# Patient Record
Sex: MALE | Race: WHITE | NOT HISPANIC OR LATINO | Employment: OTHER | URBAN - METROPOLITAN AREA
[De-identification: names, ages, dates, MRNs, and addresses within clinical notes are randomized per-mention and may not be internally consistent; named-entity substitution may affect disease eponyms.]

---

## 2017-01-04 ENCOUNTER — PROCEDURE VISIT (OUTPATIENT)
Dept: PULMONOLOGY | Facility: CLINIC | Age: 82
End: 2017-01-04
Payer: MEDICARE

## 2017-01-04 ENCOUNTER — OFFICE VISIT (OUTPATIENT)
Dept: PULMONOLOGY | Facility: CLINIC | Age: 82
End: 2017-01-04
Payer: MEDICARE

## 2017-01-04 VITALS
DIASTOLIC BLOOD PRESSURE: 70 MMHG | WEIGHT: 178 LBS | RESPIRATION RATE: 18 BRPM | HEIGHT: 66 IN | BODY MASS INDEX: 28.61 KG/M2 | SYSTOLIC BLOOD PRESSURE: 130 MMHG | HEART RATE: 84 BPM | OXYGEN SATURATION: 97 %

## 2017-01-04 DIAGNOSIS — F41.9 ANXIETY: ICD-10-CM

## 2017-01-04 DIAGNOSIS — J44.9 COPD, SEVERE: Chronic | ICD-10-CM

## 2017-01-04 DIAGNOSIS — I27.20 PULMONARY HYPERTENSION: ICD-10-CM

## 2017-01-04 DIAGNOSIS — Z99.81 HYPOXEMIA REQUIRING SUPPLEMENTAL OXYGEN: ICD-10-CM

## 2017-01-04 DIAGNOSIS — R09.02 HYPOXEMIA REQUIRING SUPPLEMENTAL OXYGEN: ICD-10-CM

## 2017-01-04 DIAGNOSIS — J44.9 COPD, SEVERE: Primary | Chronic | ICD-10-CM

## 2017-01-04 LAB
POST FEF 25 75: 0.27 L/S (ref 0.46–1.89)
POST FET 100: 13.1 S
POST FEV1 FVC: 36 %
POST FEV1: 0.72 L (ref 1.66–2.36)
POST FVC: 1.99 L (ref 2.58–3.41)
POST PEF: 3.31 L/S (ref 4.12–6.19)
PRE FEF 25 75: 0.2 L/S (ref 0.46–1.89)
PRE FET 100: 13.66 S
PRE FEV1 FVC: 29 %
PRE FEV1: 0.65 L (ref 1.66–2.36)
PRE FVC: 2.24 L (ref 2.58–3.41)
PRE PEF: 3.64 L/S (ref 4.12–6.19)
PREDICTED FEV1 FVC: 69.68 % (ref 64.84–74.52)
PREDICTED FEV1: 2.01 L (ref 1.66–2.36)
PREDICTED FVC: 2.99 L (ref 2.58–3.41)
PROVOCATION PROTOCOL: ABNORMAL

## 2017-01-04 PROCEDURE — 99214 OFFICE O/P EST MOD 30 MIN: CPT | Mod: PBBFAC | Performed by: INTERNAL MEDICINE

## 2017-01-04 PROCEDURE — 99999 PR PBB SHADOW E&M-EST. PATIENT-LVL IV: CPT | Mod: PBBFAC,,, | Performed by: INTERNAL MEDICINE

## 2017-01-04 PROCEDURE — 94060 EVALUATION OF WHEEZING: CPT | Mod: 26,S$PBB,, | Performed by: INTERNAL MEDICINE

## 2017-01-04 PROCEDURE — 99214 OFFICE O/P EST MOD 30 MIN: CPT | Mod: 25,S$PBB,, | Performed by: INTERNAL MEDICINE

## 2017-01-04 RX ORDER — ROFLUMILAST 500 UG/1
500 TABLET ORAL DAILY
Qty: 90 TABLET | Refills: 3 | Status: SHIPPED | OUTPATIENT
Start: 2017-01-04

## 2017-01-04 RX ORDER — IPRATROPIUM BROMIDE AND ALBUTEROL SULFATE 2.5; .5 MG/3ML; MG/3ML
3 SOLUTION RESPIRATORY (INHALATION)
Qty: 90 VIAL | Refills: 11 | Status: SHIPPED | OUTPATIENT
Start: 2017-01-04 | End: 2017-01-17 | Stop reason: SDUPTHER

## 2017-01-04 RX ORDER — ALPRAZOLAM 0.25 MG/1
0.25 TABLET ORAL DAILY PRN
Qty: 30 TABLET | Refills: 0 | Status: SHIPPED | OUTPATIENT
Start: 2017-01-04 | End: 2019-10-12

## 2017-01-04 NOTE — PATIENT INSTRUCTIONS
Treating Anxiety Disorders with Medication  An anxiety disorder can make you feel nervous or apprehensive, even without a clear reason. Certain anxiety disorders can cause intense feelings of fear or panic. You may even have physical symptoms, such as a racing heartbeat or dizziness. If you have these feelings, you dont have to suffer anymore. Treatment to help you overcome your fears will likely include therapy (also called counseling). Medication may also be prescribed to help control your symptoms.    Medications  Certain medications may be prescribed to help control your symptoms. As a result, you may feel less anxious. You may also feel able to move forward with therapy. At first, medications and dosages may need to be adjusted to find what works best for you. Try to be patient. Tell your health care provider how a medication makes you feel. This way, you can work together to find the treatment thats best for you. Keep in mind that medications can have side effects. Talk to your provider about any side effects that are bothering you. Changing the dose or type of medication may help. Dont stop taking medication on your own because it can cause symptoms to come back.  · Anti-anxiety medication: This medication eases symptoms and helps you relax. Your health care provider will explain when and how to use it. It may be prescribed for use before situations that makes you anxious. Or, you may be told to take it on a regular schedule. Anti-anxiety medication may make you feel a little sleepy or out of it. Dont drive a car or operate machinery while on this medication, until you know how it affects you.  Caution  Never use alcohol or other drugs with anti-anxiety medications. This could result in loss of muscular control, sedation, coma or death. Also, use only the amount of medication prescribed for you. If you think you may have taken too much, get emergency care right away.   · Antidepressant  medication: This kind of medication is often used to treat anxiety, even if you arent depressed. An antidepressant helps balance out brain chemicals. This helps keep anxiety under control. This medication is taken on a schedule. It takes a few weeks to start working. If you dont notice a change at first, you may just need more time. But if you dont notice results after the first few weeks, tell your provider.  Keep taking medications as prescribed  Never change your dosage or stop taking your medications without talking to your health care provider first. Keep the following in mind:  · Some medications must be taken on a schedule. Make this part of your daily routine. For instance, always take your pill before brushing your teeth. A pillbox can help you remember if youve taken your medication each day.  · Medications are often taken for 6 to 12 months. Your health care provider will then evaluate whether you need to stay on them. Many people who have also had therapy may no longer need medication to manage anxiety.  · You may need to stop taking medication slowly to give your body time to adjust. When its time to stop, your health care provider will tell you more. Remember: Never stop taking your medication without talking to your provider first.  · If symptoms return, you may need to start taking medications again. This isnt your fault. Its just the nature of your anxiety disorder.  Special concerns  · Side effects: Medications may cause side effects. Ask your health care provider or pharmacist what you can expect. They may have ideas for avoiding some side effects.  · Sexual problems: Some antidepressants can affect your desire for sex or your ability to have an orgasm. A change in dosage or medication often solves the problem. If you have a sexual side effect that concerns you, tell your health care provider.  · Addiction: Antidepressants are not addictive. And if youve never had a problem with drugs or  alcohol, you likely wont have a problem with anti-anxiety medication. But if you have history of addiction, you may need to avoid this medication.   © 2411-5082 [employer]. [address] [place] [address], [place] [address]. All rights reserved. This information is not intended as a substitute for professional medical care. Always follow your healthcare professional's instructions.        Treating Anxiety Disorders with Therapy  If you have an anxiety disorder, you dont have to suffer anymore. Treatment is available. Therapy (also called counseling) is often a helpful treatment for anxiety disorders. With therapy, a specially trained professional (therapist) helps you face and learn to manage your anxiety. Therapy can be short-term or long-term depending on your needs. In some cases, medication may also be prescribed with therapy. It may take time before you notice how much therapy is helping, but stick with it. With therapy, you can feel better.    Cognitive behavioral therapy (CBT)  Cognitive behavioral therapy (CBT) teaches you to manage anxiety. It does this by helping you understand how you think and act when youre anxious. Research has shown CBT to be a very effective treatment for anxiety disorders. How CBT is run is almost like a class. It involves homework and activities to build skills that teach you to cope with anxiety step by step. It can be done in a group or one-on-one, and often takes place for a set number of sessions. CBT has two main parts:  · Cognitive therapy helps you identify the negative, irrational thoughts that occur with your anxiety. Youll learn to replace these with more positive, realistic thoughts.  · Behavioral therapy helps you change how you react to anxiety. Youll learn coping skills and methods for relaxing to help you better deal with anxiety.  Other forms of therapy  Other therapy methods may work better for you than CBT. Or, you may move from CBT to another form of  therapy as your treatment needs change. This may mean meeting with a therapist by yourself or in a group. Therapy can also help you work through problems in your life, such as drug or alcohol dependence, that may be making your anxiety worse.  Getting better takes time  Therapy will help you feel better and teach you skills to help manage anxiety long term. But change doesnt happen right away. It takes a commitment from you. And treatment only works if you learn to face the causes of your anxiety. So, you might feel worse before you feel better. This can sometimes make it hard to stick with it. But remember: Therapy is a very effective treatment. The results will be well worth it.  Helping yourself  If anxiety is wearing you down, here are some things you can do to cope:  · Dont fight your feelings. Anxiety feeds itself. The more you worry about it, the worse it gets. Instead, try to identify what might have triggered your anxiety. Then try to put this threat in perspective.  · Keep in mind that you cant control everything about a situation. Change what you can and let the rest take its course.  · Exercise -- its a great way to relieve tension and help your body feel relaxed.  · Examine your life for stress, and try to find ways to reduce it.  · Avoid caffeine and nicotine, which can make anxiety symptoms worse.  · Fight the temptation to turn to alcohol or unprescribed drugs for relief. They only make things worse in the long run.   © 8753-9666 [employer]. [address] [place] [address], [place] [address]. All rights reserved. This information is not intended as a substitute for professional medical care. Always follow your healthcare professional's instructions.        Anxiety Reaction  Anxiety is the feeling we all get when we think something bad might happen. It is a normal response to stress and usually causes only a mild reaction. When anxiety becomes more severe, it can interfere with daily life.  In some cases, you may not even be aware of what it is youre anxious about. There may also be a genetic link or it may be a learned behavior in the home.  Both psychological and physical triggers cause stress reaction. It's often a response to fear or emotional stress, real or imagined. This stress may come from home, family, work, or social relationships.  During an anxiety reaction, you may feel:  · Helpless  · Nervous  · Depressed  · Irritable  Your body may show signs of anxiety in many ways. You may experience:  · Dry mouth  · Shakiness  · Dizziness  · Weakness  · Trouble breathing  · Breathing fast (hyperventilating)  · Chest pressure  · Sweating  · Headache  · Nausea  · Diarrhea  · Tiredness  · Inability to sleep  · Sexual problems  Home care  · Try to locate the sources of stress in your life. They may not be obvious. These may include:  ¨ Daily hassles of life (traffic jams, missed appointments, car troubles, etc.)  ¨ Major life changes, both good (new baby, job promotion) and bad (loss of job, loss of loved one)  ¨ Overload: feeling that you have too many responsibilities and can't take care of all of them at once  ¨ Feeling helpless, feeling that your problems are beyond what youre able to solve  · Notice how your body reacts to stress. Learn to listen to your body signals. This will help you take action before the stress becomes severe.  · When you can, do something about the source of your stress. (Avoid hassles, limit the amount of change that happens in your life at one time and take a break when you feel overloaded).  · Unfortunately, many stressful situations can't be avoided. It is necessary to learn how to better manage stress. There are many proven methods that will reduce your anxiety. These include simple things like exercise, good nutrition and adequate rest. Also, there are certain techniques that are helpful:  ¨ Relaxation  ¨ Breathing  exercises  ¨ Visualization  ¨ Biofeedback  ¨ Meditation  For more information about this, consult your doctor or go to a local bookstore and review the many books and tapes available on this subject.  Follow-up care  If you feel that your anxiety is not responding to self-help measures, contact your doctor or make an appointment with a counselor. You may need short-term psychological counseling and temporary medicine to help you manage stress.  Call 911  Call your healthcare provider right away if any of these occur:  · Trouble breathing  · Confusion  · Drowsiness or trouble wakening  · Fainting or loss of consciousness  · Rapid heart rate  · Seizure  · New chest pain that becomes more severe, lasts longer, or spreads into your shoulder, arm, neck, jaw, or back  When to seek medical advice  Call your healthcare provider right away if any of these occur:  · Your symptoms get worse  · Severe headache not relieved by rest and mild pain reliever  © 0753-2463 [employer]. [address] [place] [address]. All rights reserved. This information is not intended as a substitute for professional medical care. Always follow your healthcare professional's instructions.        Your Bodys Response to Anxiety  Normal anxiety is part of the bodys natural defense system. It's an alert to a threat that is unknown, vague, or comes from your own internal fears. While youre in this state, your feelings can range from a vague sense of worry to physical sensations such as a pounding heartbeat. These feelings make you want to react to the threat. An anxiety response is normal in many situations. But when you have an anxiety disorder, the same response can occur at the wrong times.    Anxiety can be helpful  Normal anxiety is a signal from your brain that warns you of a threat and is a normal response to help you prevent something or decrease the bad effects of something you can't control. For example, anxiety is a  normal response to situations that might damage your body, separate you from a loved one, or lose your job. The symptoms of anxiety can be physical and mental.  How does it feel?  At certain times, people with anxiety may have:  · Dizziness  · Muscle tension or pain  · Restlessness  · Sleeplessness  · Difficulty concentrating  · Racing heartbeat  · Fast breathing  · Shaking or trembling  · Stomachache  · Diarrhea  · Loss of energy  · Sweating  · Cold, clammy hands  · Chest pain  · Dry mouth  Anxiety can also be a problem  Anxiety can become a problem when it is difficult to control, occurs for months, and interferes with important parts of your life. With an anxiety disorder, your body has the response described above, but in inappropriate ways. The response a person has depends on the anxiety disorder he or she has. With some disorders, the anxiety is way out of proportion to the threat that triggers it. With others, anxiety may occur even when there isnt a clear threat or trigger.  Who does it affect?  Some people are more prone to persistent anxiety than others. It tends to run in families, and it affects more younger people than older people. But no age, race, or gender is immune to anxiety problems.  Anxiety can be treated  The good news is that the anxiety thats disrupting your life can be treated. Working with your doctor or other healthcare provider, you can develop skills to help you cope with anxiety. You can also gain the perspective you need to overcome your fears. Note: Good sources of support or guidance can be found at your local hospital, mental health clinic, or an employee assistance program.     If anxiety is wearing you down, here are some things you can do to cope:  · Keep in mind that you cant control everything about a situation. Change what you can and let the rest take its course.  · Exercise--its a great way to relieve tension and help your body feel relaxed.  · Avoid caffeine and  nicotine, which can make anxiety symptoms worse.  · Fight the temptation to turn to alcohol or unprescribed drugs for relief. They only make things worse in the long run.   © 7601-4301 [employer]. [address] [place] [address], [place] [address]. All rights reserved. This information is not intended as a substitute for professional medical care. Always follow your healthcare professional's instructions.      Alprazolam Oral tablet  What is this medicine?  ALPRAZOLAM (al PRAY vasu tineo) is a benzodiazepine. It is used to treat anxiety and panic attacks.  This medicine may be used for other purposes; ask your health care provider or pharmacist if you have questions.  What should I tell my health care provider before I take this medicine?  They need to know if you have any of these conditions:  · an alcohol or drug abuse problem  · bipolar disorder, depression, psychosis or other mental health conditions  · glaucoma  · kidney or liver disease  · lung or breathing disease  · myasthenia gravis  · Parkinson's disease  · porphyria  · seizures or a history of seizures  · suicidal thoughts  · an unusual or allergic reaction to alprazolam, other benzodiazepines, foods, dyes, or preservatives  · pregnant or trying to get pregnant  · breast-feeding  How should I use this medicine?  Take this medicine by mouth with a glass of water. Follow the directions on the prescription label. Take your medicine at regular intervals. Do not take it more often than directed. If you have been taking this medicine regularly for some time, do not suddenly stop taking it. You must gradually reduce the dose or you may get severe side effects. Ask your doctor or health care professional for advice. Even after you stop taking this medicine it can still affect your body for several days.  Talk to your pediatrician regarding the use of this medicine in children. Special care may be needed.  Overdosage: If you think you have taken too much of  this medicine contact a poison control center or emergency room at once.  NOTE: This medicine is only for you. Do not share this medicine with others.  What if I miss a dose?  If you miss a dose, take it as soon as you can. If it is almost time for your next dose, take only that dose. Do not take double or extra doses.  What may interact with this medicine?  Do not take this medicine with any of the following medications:  · certain medicines for HIV infection or AIDS  · ketoconazole  · itraconazole  This medicine may also interact with the following medications:  · birth control pills  · certain macrolide antibiotics like clarithromycin, erythromycin, troleandomycin  · cimetidine  · cyclosporine  · ergotamine  · grapefruit juice  · herbal or dietary supplements like kava kava, melatonin, dehydroepiandrosterone, DHEA, Mere's Wort or valerian  · imatinib, STI-571  · isoniazid  · levodopa  · medicines for depression, anxiety, or psychotic disturbances  · prescription pain medicines  · rifampin, rifapentine, or rifabutin  · some medicines for blood pressure or heart problems  · some medicines for seizures like carbamazepine, oxcarbazepine, phenobarbital, phenytoin, primidone  This list may not describe all possible interactions. Give your health care provider a list of all the medicines, herbs, non-prescription drugs, or dietary supplements you use. Also tell them if you smoke, drink alcohol, or use illegal drugs. Some items may interact with your medicine.  What should I watch for while using this medicine?  Visit your doctor or health care professional for regular checks on your progress. Your body can become dependent on this medicine. Ask your doctor or health care professional if you still need to take it.  You may get drowsy or dizzy. Do not drive, use machinery, or do anything that needs mental alertness until you know how this medicine affects you. To reduce the risk of dizzy and fainting spells, do not  stand or sit up quickly, especially if you are an older patient. Alcohol may increase dizziness and drowsiness. Avoid alcoholic drinks.  Do not treat yourself for coughs, colds or allergies without asking your doctor or health care professional for advice. Some ingredients can increase possible side effects.  What side effects may I notice from receiving this medicine?  Side effects that you should report to your doctor or health care professional as soon as possible:  · allergic reactions like skin rash, itching or hives, swelling of the face, lips, or tongue  · confusion, forgetfulness  · depression  · difficulty sleeping  · difficulty speaking  · feeling faint or lightheaded, falls  · mood changes, excitability or aggressive behavior  · muscle cramps  · trouble passing urine or change in the amount of urine  · unusually weak or tired  Side effects that usually do not require medical attention (report to your doctor or health care professional if they continue or are bothersome):  · change in sex drive or performance  · changes in appetite  This list may not describe all possible side effects. Call your doctor for medical advice about side effects. You may report side effects to FDA at 8-811-FDA-4290.  Where should I keep my medicine?  Keep out of the reach of children. This medicine can be abused. Keep your medicine in a safe place to protect it from theft. Do not share this medicine with anyone. Selling or giving away this medicine is dangerous and against the law.  Store at room temperature between 20 and 25 degrees C (68 and 77 degrees F). This medicine may cause accidental overdose and death if taken by other adults, children, or pets. Mix any unused medicine with a substance like cat litter or coffee grounds. Then throw the medicine away in a sealed container like a sealed bag or a coffee can with a lid. Do not use the medicine after the expiration date.  NOTE: This sheet is a summary. It may not cover all  possible information. If you have questions about this medicine, talk to your doctor, pharmacist, or health care provider.  NOTE:This sheet is a summary. It may not cover all possible information. If you have questions about this medicine, talk to your doctor, pharmacist, or health care provider. Copyright© 2016 [employer]

## 2017-01-04 NOTE — PROGRESS NOTES
"Subjective:      Kelton Mcdaniels is a 89 y.o. male with known COPD    Mr. Lr has very severe COPD.  FEV1 is 0.65 (32% predicted)  MRC grade 2.  COPD test score is 22.  Please see me a couple of times due to exacerbations added Daliresp is feeling much better.    He is currently enrolled in the home exercise program and doing the hand ergometer routine.  Medications reviewed Daliresp, Advair, DuoNeb and Spiriva Respimat daily   confirmed from the pharmacy " spiriva respimat" ready for him  He is on oxygen supplement to the 2 L/m.  Patient feels subjectively much better since I last saw him.  He requests medications for anxiety related to COPD.  Added Xanax for him to use this very sparingly   He has completed pulmonary rehabilitation department.    Also get him enrolled in the pulmonary chronic disease management overall doing well back in about 3-6 months    The following portions of the patient's history were reviewed and updated as appropriate:   He  has a past medical history of Atrial fibrillation, chronic; CAD in native artery (12/18/2015); Cataract; Chronic airway obstruction, not elsewhere classified; Coronary atherosclerosis of unspecified type of vessel, native or graft; Esophageal reflux; Essential hypertension; Heart disease; History of bladder cancer; History of colon cancer; Hyperlipidemia LDL goal <70; Hypertrophy of prostate without urinary obstruction and other lower urinary tract symptoms (LUTS); Impaired fasting glucose; Macular degeneration; Pulmonary hypertension (12/18/2015); and TR (tricuspid regurgitation) (12/18/2015).  He  does not have any pertinent problems on file.  He  has a past surgical history that includes partial coloectomy for large polyp; Coronary artery bypass graft; Colon surgery; and Back surgery.  His family history includes Asthma in his paternal uncle; Cancer in his brother; Diabetes in his father; Heart failure in his mother.  He  reports that he has quit smoking. His " smoking use included Cigarettes. He has a 140.00 pack-year smoking history. He has never used smokeless tobacco. He reports that he does not drink alcohol or use illicit drugs.  He has a current medication list which includes the following prescription(s): albuterol, albuterol-ipratropium 2.5mg-0.5mg/3ml, carvedilol, doxycycline, fluticasone-salmeterol 250-50 mcg/dose, hydrochlorothiazide, mucus clearing device, omeprazole, prednisone, roflumilast, simvastatin, tiotropium bromide, alprazolam, butenafine, gabapentin, and nitroglycerin.  Current Outpatient Prescriptions on File Prior to Visit   Medication Sig Dispense Refill    albuterol (PROAIR HFA) 90 mcg/actuation inhaler Inhale 2 puffs into the lungs every 4 (four) hours as needed. 1 Inhaler 11    carvedilol (COREG) 25 MG tablet TAKE ONE TABLET BY MOUTH TWICE DAILY 60 tablet 11    doxycycline (MONODOX) 100 MG capsule Take 1 capsule (100 mg total) by mouth 2 (two) times daily. 20 capsule 0    fluticasone-salmeterol 250-50 mcg/dose (ADVAIR DISKUS) 250-50 mcg/dose diskus inhaler INHALE ONE DOSE BY MOUTH TWICE DAILY 60 each 11    hydrochlorothiazide (HYDRODIURIL) 25 MG tablet Take 1 tablet (25 mg total) by mouth once daily. 90 tablet 3    mucus clearing device (ACAPELLA) Sariah 1 Device by Misc.(Non-Drug; Combo Route) route every 4 to 6 hours as needed. 1 Device 0    omeprazole (PRILOSEC) 20 MG capsule Take 1 capsule (20 mg total) by mouth once daily. 90 capsule 3    predniSONE (DELTASONE) 20 MG tablet 3 tab for 3 days. 2 tab for 3 days. 1 tab for 3 days. 1/2 tab for 3 days 21 tablet 0    simvastatin (ZOCOR) 40 MG tablet Take 1 tablet (40 mg total) by mouth every evening. 90 tablet 3    tiotropium bromide 2.5 mcg/actuation Mist Inhale 2.5 mcg into the lungs once daily. 4 g 11    [DISCONTINUED] albuterol-ipratropium 2.5mg-0.5mg/3mL (DUO-NEB) 0.5 mg-3 mg(2.5 mg base)/3 mL nebulizer solution Take 3 mLs by nebulization every 6 (six) hours while awake. DX:  "J44.9  Please fax to [phone] [facility] 90 vial 11    [DISCONTINUED] roflumilast (DALIRESP) 500 mcg Tab Take 1 tablet (500 mcg total) by mouth once daily. 30 tablet 3    butenafine 1 % cream Apply topically 2 (two) times daily. 12 g 0    gabapentin (NEURONTIN) 100 MG capsule Take 1 capsule (100 mg total) by mouth every evening. 30 capsule 0    nitroGLYCERIN (NITROSTAT) 0.4 MG SL tablet Place 1 tablet (0.4 mg total) under the tongue every 5 (five) minutes as needed for Chest pain. 20 tablet 12     No current facility-administered medications on file prior to visit.      He is allergic to no known allergies..    Review of Systems  A comprehensive review of systems was negative except for: Constitutional: positive for fatigue  Respiratory: positive for dyspnea on exertion  Neurological: positive for anxiety attacks       Objective:        Visit Vitals    /70    Pulse 84    Resp 18    Ht 5' 6" (1.676 m)    Wt 80.7 kg (178 lb)    SpO2 97%  Comment: 2.0 LPM    BMI 28.73 kg/m2     FEV1: 0.65 L, 32 % of predicted  FEV1/FVC: 29 %  General appearance: alert, appears stated age, cooperative, no distress and using purse lip breathing skills  Head: Normocephalic, without obvious abnormality, atraumatic  Lungs: clear to auscultation bilaterally, normal percussion bilaterally and diminshed air flow bilaterally  Heart: regular rate and rhythm, S1, S2 normal, no murmur, click, rub or gallop  Extremities: extremities normal, atraumatic, no cyanosis or edema  Pulses: 2+ and symmetric  Skin: Skin color, texture, turgor normal. No rashes or lesions  Neurologic: Grossly normal         FEV1 was 0.65 (32% predicted) FVC was 2.24 (75% predicted) FEV1/FEC was 29    Assessment:      Problem List Items Addressed This Visit     COPD, severe - Primary (Chronic)    Relevant Medications    albuterol-ipratropium 2.5mg-0.5mg/3mL (DUO-NEB) 0.5 mg-3 mg(2.5 mg base)/3 mL nebulizer solution    alprazolam (XANAX) 0.25 MG tablet    " roflumilast (DALIRESP) 500 mcg Tab    Other Relevant Orders    X-Ray Chest PA And Lateral    Spirometry with/without bronchodilator    Ambulatory Referral to Pulmonary Disease Management    Pulmonary hypertension    Hypoxemia requiring supplemental oxygen    Anxiety    Relevant Medications    alprazolam (XANAX) 0.25 MG tablet         Plan:    Adherence with respiratory regime, oxygen  Xanax PRN for anxiety, use sparingly ( avoid other sedative meds)  Return in about 6 months (around 7/4/2017) for cxr, [facility], [facility].    This note was prepared using voice recognition system and is likely to have sound alike errors that may have been overlooked even after proof reading.  Please call me with any questions    Discussed diagnosis, its evaluation, treatment and usual course. All questions answered.    Thank you for the courtesy of participating in the care of this patient    Vinicius Cat MD    Patient prescribed a controlled substance. Printed and signed copy of prescription given to patient. Side effects reviewed in detail. Instructed not to combine with other controlled substances, alcohol or recreational drugs. Habit forming, addictive potential of drug discussed. Advised not to operate a vehicle while taking this medication. Policy of limited refills discussed

## 2017-01-04 NOTE — MR AVS SNAPSHOT
[facility]  [address] [place] [id number]  Phone: [phone]  Fax: [phone]                  Kelton Mcdaniels   2017 9:20 AM   Office Visit    Description:  Male : 1/3/1928   Provider:  Vinicius Cat MD   Department:  [facility]           Reason for Visit     COPD           Diagnoses this Visit        Comments    COPD, severe    -  Primary     Hypoxemia requiring supplemental oxygen         Pulmonary hypertension         Anxiety         COPD, severe                To Do List           Future Appointments        Provider Department Dept Phone    2017 9:40 AM [facility] [phone]    2017 8:40 AM Sunny Plummer MD [facility] [phone]      Goals (5 Years of Data)     None      Follow-Up and Disposition     Return in about 6 months (around 2017) for cxr, [facility], [facility].       These Medications        Disp Refills Start End    albuterol-ipratropium 2.5mg-0.5mg/3mL (DUO-NEB) 0.5 mg-3 mg(2.5 mg base)/3 mL nebulizer solution 90 vial 11 2017    Take 3 mLs by nebulization every 6 (six) hours while awake. DX: J44.9 - Nebulization    Pharmacy: [facility] [address] [place] [address] Ph #: [phone]       Notes to Pharmacy: Dx: COPD - J44.9    alprazolam (XANAX) 0.25 MG tablet 30 tablet 0 2017 2/3/2017    Take 1 tablet (0.25 mg total) by mouth daily as needed for Anxiety. For panic attacks - Oral    Pharmacy: [facility] [address] [place] [address] Ph #: [phone]       roflumilast (DALIRESP) 500 mcg Tab 90 tablet 3 2017     Take 1 tablet (500 mcg total) by mouth once daily. - Oral    Pharmacy: [facility] [address] [place] [address] Ph #: [phone]         [facility]     [facility] -  Assistance  Registered nurses in the [facility]  provide clinical advisement, health education, appointment booking, and other advisory services.  Call for this free service at [phone].             Medications           Message regarding Medications     Verify the changes and/or additions to your medication regime listed below are the same as discussed with your clinician today.  If any of these changes or additions are incorrect, please notify your healthcare provider.        START taking these NEW medications        Refills    alprazolam (XANAX) 0.25 MG tablet 0    Sig: Take 1 tablet (0.25 mg total) by mouth daily as needed for Anxiety. For panic attacks    Class: Normal    Route: Oral      CHANGE how you are taking these medications     Start Taking Instead of    albuterol-ipratropium 2.5mg-0.5mg/3mL (DUO-NEB) 0.5 mg-3 mg(2.5 mg base)/3 mL nebulizer solution albuterol-ipratropium 2.5mg-0.5mg/3mL (DUO-NEB) 0.5 mg-3 mg(2.5 mg base)/3 mL nebulizer solution    Dosage:  Take 3 mLs by nebulization every 6 (six) hours while awake. DX: J44.9 Dosage:  Take 3 mLs by nebulization every 6 (six) hours while awake. DX: J44.9  Please fax to [phone] [facility]    Reason for Change:  Reorder            Verify that the below list of medications is an accurate representation of the medications you are currently taking.  If none reported, the list may be blank. If incorrect, please contact your healthcare provider. Carry this list with you in case of emergency.           Current Medications     albuterol (PROAIR HFA) 90 mcg/actuation inhaler Inhale 2 puffs into the lungs every 4 (four) hours as needed.    albuterol-ipratropium 2.5mg-0.5mg/3mL (DUO-NEB) 0.5 mg-3 mg(2.5 mg base)/3 mL nebulizer solution Take 3 mLs by nebulization every 6 (six) hours while awake. DX: J44.9    alprazolam (XANAX) 0.25 MG tablet Take 1 tablet (0.25 mg total) by mouth daily as needed for Anxiety. For panic attacks    butenafine 1 % cream Apply topically 2 (two) times daily.    carvedilol (COREG)  "25 MG tablet TAKE ONE TABLET BY MOUTH TWICE DAILY    doxycycline (MONODOX) 100 MG capsule Take 1 capsule (100 mg total) by mouth 2 (two) times daily.    fluticasone-salmeterol 250-50 mcg/dose (ADVAIR DISKUS) 250-50 mcg/dose diskus inhaler INHALE ONE DOSE BY MOUTH TWICE DAILY    gabapentin (NEURONTIN) 100 MG capsule Take 1 capsule (100 mg total) by mouth every evening.    hydrochlorothiazide (HYDRODIURIL) 25 MG tablet Take 1 tablet (25 mg total) by mouth once daily.    mucus clearing device (ACAPELLA) Sariah 1 Device by Misc.(Non-Drug; Combo Route) route every 4 to 6 hours as needed.    nitroGLYCERIN (NITROSTAT) 0.4 MG SL tablet Place 1 tablet (0.4 mg total) under the tongue every 5 (five) minutes as needed for Chest pain.    omeprazole (PRILOSEC) 20 MG capsule Take 1 capsule (20 mg total) by mouth once daily.    predniSONE (DELTASONE) 20 MG tablet 3 tab for 3 days. 2 tab for 3 days. 1 tab for 3 days. 1/2 tab for 3 days    roflumilast (DALIRESP) 500 mcg Tab Take 1 tablet (500 mcg total) by mouth once daily.    simvastatin (ZOCOR) 40 MG tablet Take 1 tablet (40 mg total) by mouth every evening.    tiotropium bromide 2.5 mcg/actuation Mist Inhale 2.5 mcg into the lungs once daily.           Clinical Reference Information           Vital Signs - Last Recorded  Most recent update: 1/4/2017  9:56 AM by Vinicius Cat MD    BP Pulse Resp Ht Wt SpO2    130/70 84 18 5' 6" (1.676 m) 80.7 kg (178 lb) 97%    BMI                28.73 kg/m2          Blood Pressure          Most Recent Value    BP  130/70      Allergies as of 1/4/2017     No Known Allergies      Immunizations Administered on Date of Encounter - 1/4/2017     None      Orders Placed During Today's Visit      Normal Orders This Visit    Ambulatory Referral to Pulmonary Disease Management     Future Labs/Procedures Expected by Expires    X-Ray Chest PA And Lateral  1/4/2017 1/4/2018    Spirometry with/without bronchodilator  As directed 1/4/2018      [facility] " Sign-Up     Activating your [facility] account is as easy as 1-2-3!     1) Visit [email or web address], select Sign Up Now, enter this activation code and your date of birth, then select Next.  [email or web address]  Expires: 2/18/2017 10:18 AM      2) Create a username and password to use when you visit [facility] in the future and select a security question in case you lose your password and select Next.    3) Enter your e-mail address and click Sign Up!    Additional Information  If you have questions, please e-mail [email or web address] or call [phone] to talk to our [facility] staff. Remember, [facility] is NOT to be used for urgent needs. For medical emergencies, dial 911.         Instructions      Treating Anxiety Disorders with Medication  An anxiety disorder can make you feel nervous or apprehensive, even without a clear reason. Certain anxiety disorders can cause intense feelings of fear or panic. You may even have physical symptoms, such as a racing heartbeat or dizziness. If you have these feelings, you dont have to suffer anymore. Treatment to help you overcome your fears will likely include therapy (also called counseling). Medication may also be prescribed to help control your symptoms.    Medications  Certain medications may be prescribed to help control your symptoms. As a result, you may feel less anxious. You may also feel able to move forward with therapy. At first, medications and dosages may need to be adjusted to find what works best for you. Try to be patient. Tell your health care provider how a medication makes you feel. This way, you can work together to find the treatment thats best for you. Keep in mind that medications can have side effects. Talk to your provider about any side effects that are bothering you. Changing the dose or type of medication may help. Dont stop taking medication on your own because it can cause symptoms to come back.  · Anti-anxiety medication: This medication eases  symptoms and helps you relax. Your health care provider will explain when and how to use it. It may be prescribed for use before situations that makes you anxious. Or, you may be told to take it on a regular schedule. Anti-anxiety medication may make you feel a little sleepy or out of it. Dont drive a car or operate machinery while on this medication, until you know how it affects you.  Caution  Never use alcohol or other drugs with anti-anxiety medications. This could result in loss of muscular control, sedation, coma or death. Also, use only the amount of medication prescribed for you. If you think you may have taken too much, get emergency care right away.   · Antidepressant medication: This kind of medication is often used to treat anxiety, even if you arent depressed. An antidepressant helps balance out brain chemicals. This helps keep anxiety under control. This medication is taken on a schedule. It takes a few weeks to start working. If you dont notice a change at first, you may just need more time. But if you dont notice results after the first few weeks, tell your provider.  Keep taking medications as prescribed  Never change your dosage or stop taking your medications without talking to your health care provider first. Keep the following in mind:  · Some medications must be taken on a schedule. Make this part of your daily routine. For instance, always take your pill before brushing your teeth. A pillbox can help you remember if youve taken your medication each day.  · Medications are often taken for 6 to 12 months. Your health care provider will then evaluate whether you need to stay on them. Many people who have also had therapy may no longer need medication to manage anxiety.  · You may need to stop taking medication slowly to give your body time to adjust. When its time to stop, your health care provider will tell you more. Remember: Never stop taking your medication without talking to your  provider first.  · If symptoms return, you may need to start taking medications again. This isnt your fault. Its just the nature of your anxiety disorder.  Special concerns  · Side effects: Medications may cause side effects. Ask your health care provider or pharmacist what you can expect. They may have ideas for avoiding some side effects.  · Sexual problems: Some antidepressants can affect your desire for sex or your ability to have an orgasm. A change in dosage or medication often solves the problem. If you have a sexual side effect that concerns you, tell your health care provider.  · Addiction: Antidepressants are not addictive. And if youve never had a problem with drugs or alcohol, you likely wont have a problem with anti-anxiety medication. But if you have history of addiction, you may need to avoid this medication.   © 8010-8459 [employer]. [address] [place] [address], [place] [address]. All rights reserved. This information is not intended as a substitute for professional medical care. Always follow your healthcare professional's instructions.        Treating Anxiety Disorders with Therapy  If you have an anxiety disorder, you dont have to suffer anymore. Treatment is available. Therapy (also called counseling) is often a helpful treatment for anxiety disorders. With therapy, a specially trained professional (therapist) helps you face and learn to manage your anxiety. Therapy can be short-term or long-term depending on your needs. In some cases, medication may also be prescribed with therapy. It may take time before you notice how much therapy is helping, but stick with it. With therapy, you can feel better.    Cognitive behavioral therapy (CBT)  Cognitive behavioral therapy (CBT) teaches you to manage anxiety. It does this by helping you understand how you think and act when youre anxious. Research has shown CBT to be a very effective treatment for anxiety disorders. How CBT is run is  almost like a class. It involves homework and activities to build skills that teach you to cope with anxiety step by step. It can be done in a group or one-on-one, and often takes place for a set number of sessions. CBT has two main parts:  · Cognitive therapy helps you identify the negative, irrational thoughts that occur with your anxiety. Youll learn to replace these with more positive, realistic thoughts.  · Behavioral therapy helps you change how you react to anxiety. Youll learn coping skills and methods for relaxing to help you better deal with anxiety.  Other forms of therapy  Other therapy methods may work better for you than CBT. Or, you may move from CBT to another form of therapy as your treatment needs change. This may mean meeting with a therapist by yourself or in a group. Therapy can also help you work through problems in your life, such as drug or alcohol dependence, that may be making your anxiety worse.  Getting better takes time  Therapy will help you feel better and teach you skills to help manage anxiety long term. But change doesnt happen right away. It takes a commitment from you. And treatment only works if you learn to face the causes of your anxiety. So, you might feel worse before you feel better. This can sometimes make it hard to stick with it. But remember: Therapy is a very effective treatment. The results will be well worth it.  Helping yourself  If anxiety is wearing you down, here are some things you can do to cope:  · Dont fight your feelings. Anxiety feeds itself. The more you worry about it, the worse it gets. Instead, try to identify what might have triggered your anxiety. Then try to put this threat in perspective.  · Keep in mind that you cant control everything about a situation. Change what you can and let the rest take its course.  · Exercise -- its a great way to relieve tension and help your body feel relaxed.  · Examine your life for stress, and try to find ways  to reduce it.  · Avoid caffeine and nicotine, which can make anxiety symptoms worse.  · Fight the temptation to turn to alcohol or unprescribed drugs for relief. They only make things worse in the long run.   © 3077-8732 [employer]. [address] [place] [address], [place] [address]. All rights reserved. This information is not intended as a substitute for professional medical care. Always follow your healthcare professional's instructions.        Anxiety Reaction  Anxiety is the feeling we all get when we think something bad might happen. It is a normal response to stress and usually causes only a mild reaction. When anxiety becomes more severe, it can interfere with daily life. In some cases, you may not even be aware of what it is youre anxious about. There may also be a genetic link or it may be a learned behavior in the home.  Both psychological and physical triggers cause stress reaction. It's often a response to fear or emotional stress, real or imagined. This stress may come from home, family, work, or social relationships.  During an anxiety reaction, you may feel:  · Helpless  · Nervous  · Depressed  · Irritable  Your body may show signs of anxiety in many ways. You may experience:  · Dry mouth  · Shakiness  · Dizziness  · Weakness  · Trouble breathing  · Breathing fast (hyperventilating)  · Chest pressure  · Sweating  · Headache  · Nausea  · Diarrhea  · Tiredness  · Inability to sleep  · Sexual problems  Home care  · Try to locate the sources of stress in your life. They may not be obvious. These may include:  ¨ Daily hassles of life (traffic jams, missed appointments, car troubles, etc.)  ¨ Major life changes, both good (new baby, job promotion) and bad (loss of job, loss of loved one)  ¨ Overload: feeling that you have too many responsibilities and can't take care of all of them at once  ¨ Feeling helpless, feeling that your problems are beyond what youre able to solve  · Notice how your body  reacts to stress. Learn to listen to your body signals. This will help you take action before the stress becomes severe.  · When you can, do something about the source of your stress. (Avoid hassles, limit the amount of change that happens in your life at one time and take a break when you feel overloaded).  · Unfortunately, many stressful situations can't be avoided. It is necessary to learn how to better manage stress. There are many proven methods that will reduce your anxiety. These include simple things like exercise, good nutrition and adequate rest. Also, there are certain techniques that are helpful:  ¨ Relaxation  ¨ Breathing exercises  ¨ Visualization  ¨ Biofeedback  ¨ Meditation  For more information about this, consult your doctor or go to a local bookstore and review the many books and tapes available on this subject.  Follow-up care  If you feel that your anxiety is not responding to self-help measures, contact your doctor or make an appointment with a counselor. You may need short-term psychological counseling and temporary medicine to help you manage stress.  Call 911  Call your healthcare provider right away if any of these occur:  · Trouble breathing  · Confusion  · Drowsiness or trouble wakening  · Fainting or loss of consciousness  · Rapid heart rate  · Seizure  · New chest pain that becomes more severe, lasts longer, or spreads into your shoulder, arm, neck, jaw, or back  When to seek medical advice  Call your healthcare provider right away if any of these occur:  · Your symptoms get worse  · Severe headache not relieved by rest and mild pain reliever  © 6577-5389 [employer]. [address] [place] [address], [place] [address]. All rights reserved. This information is not intended as a substitute for professional medical care. Always follow your healthcare professional's instructions.        Your Bodys Response to Anxiety  Normal anxiety is part of the bodys natural defense system. It's  an alert to a threat that is unknown, vague, or comes from your own internal fears. While youre in this state, your feelings can range from a vague sense of worry to physical sensations such as a pounding heartbeat. These feelings make you want to react to the threat. An anxiety response is normal in many situations. But when you have an anxiety disorder, the same response can occur at the wrong times.    Anxiety can be helpful  Normal anxiety is a signal from your brain that warns you of a threat and is a normal response to help you prevent something or decrease the bad effects of something you can't control. For example, anxiety is a normal response to situations that might damage your body, separate you from a loved one, or lose your job. The symptoms of anxiety can be physical and mental.  How does it feel?  At certain times, people with anxiety may have:  · Dizziness  · Muscle tension or pain  · Restlessness  · Sleeplessness  · Difficulty concentrating  · Racing heartbeat  · Fast breathing  · Shaking or trembling  · Stomachache  · Diarrhea  · Loss of energy  · Sweating  · Cold, clammy hands  · Chest pain  · Dry mouth  Anxiety can also be a problem  Anxiety can become a problem when it is difficult to control, occurs for months, and interferes with important parts of your life. With an anxiety disorder, your body has the response described above, but in inappropriate ways. The response a person has depends on the anxiety disorder he or she has. With some disorders, the anxiety is way out of proportion to the threat that triggers it. With others, anxiety may occur even when there isnt a clear threat or trigger.  Who does it affect?  Some people are more prone to persistent anxiety than others. It tends to run in families, and it affects more younger people than older people. But no age, race, or gender is immune to anxiety problems.  Anxiety can be treated  The good news is that the anxiety thats disrupting  your life can be treated. Working with your doctor or other healthcare provider, you can develop skills to help you cope with anxiety. You can also gain the perspective you need to overcome your fears. Note: Good sources of support or guidance can be found at your local hospital, mental health clinic, or an employee assistance program.     If anxiety is wearing you down, here are some things you can do to cope:  · Keep in mind that you cant control everything about a situation. Change what you can and let the rest take its course.  · Exercise--its a great way to relieve tension and help your body feel relaxed.  · Avoid caffeine and nicotine, which can make anxiety symptoms worse.  · Fight the temptation to turn to alcohol or unprescribed drugs for relief. They only make things worse in the long run.   © 2803-6945 [employer]. [address] [place] [address], [place] [address]. All rights reserved. This information is not intended as a substitute for professional medical care. Always follow your healthcare professional's instructions.      Alprazolam Oral tablet  What is this medicine?  ALPRAZOLAM (al PRAY vasu tineo) is a benzodiazepine. It is used to treat anxiety and panic attacks.  This medicine may be used for other purposes; ask your health care provider or pharmacist if you have questions.  What should I tell my health care provider before I take this medicine?  They need to know if you have any of these conditions:  · an alcohol or drug abuse problem  · bipolar disorder, depression, psychosis or other mental health conditions  · glaucoma  · kidney or liver disease  · lung or breathing disease  · myasthenia gravis  · Parkinson's disease  · porphyria  · seizures or a history of seizures  · suicidal thoughts  · an unusual or allergic reaction to alprazolam, other benzodiazepines, foods, dyes, or preservatives  · pregnant or trying to get pregnant  · breast-feeding  How should I use this medicine?  Take this  medicine by mouth with a glass of water. Follow the directions on the prescription label. Take your medicine at regular intervals. Do not take it more often than directed. If you have been taking this medicine regularly for some time, do not suddenly stop taking it. You must gradually reduce the dose or you may get severe side effects. Ask your doctor or health care professional for advice. Even after you stop taking this medicine it can still affect your body for several days.  Talk to your pediatrician regarding the use of this medicine in children. Special care may be needed.  Overdosage: If you think you have taken too much of this medicine contact a poison control center or emergency room at once.  NOTE: This medicine is only for you. Do not share this medicine with others.  What if I miss a dose?  If you miss a dose, take it as soon as you can. If it is almost time for your next dose, take only that dose. Do not take double or extra doses.  What may interact with this medicine?  Do not take this medicine with any of the following medications:  · certain medicines for HIV infection or AIDS  · ketoconazole  · itraconazole  This medicine may also interact with the following medications:  · birth control pills  · certain macrolide antibiotics like clarithromycin, erythromycin, troleandomycin  · cimetidine  · cyclosporine  · ergotamine  · grapefruit juice  · herbal or dietary supplements like kava kava, melatonin, dehydroepiandrosterone, DHEA, [place]'s Wort or valerian  · imatinib, STI-571  · isoniazid  · levodopa  · medicines for depression, anxiety, or psychotic disturbances  · prescription pain medicines  · rifampin, rifapentine, or rifabutin  · some medicines for blood pressure or heart problems  · some medicines for seizures like carbamazepine, oxcarbazepine, phenobarbital, phenytoin, primidone  This list may not describe all possible interactions. Give your health care provider a list of all the medicines,  herbs, non-prescription drugs, or dietary supplements you use. Also tell them if you smoke, drink alcohol, or use illegal drugs. Some items may interact with your medicine.  What should I watch for while using this medicine?  Visit your doctor or health care professional for regular checks on your progress. Your body can become dependent on this medicine. Ask your doctor or health care professional if you still need to take it.  You may get drowsy or dizzy. Do not drive, use machinery, or do anything that needs mental alertness until you know how this medicine affects you. To reduce the risk of dizzy and fainting spells, do not stand or sit up quickly, especially if you are an older patient. Alcohol may increase dizziness and drowsiness. Avoid alcoholic drinks.  Do not treat yourself for coughs, colds or allergies without asking your doctor or health care professional for advice. Some ingredients can increase possible side effects.  What side effects may I notice from receiving this medicine?  Side effects that you should report to your doctor or health care professional as soon as possible:  · allergic reactions like skin rash, itching or hives, swelling of the face, lips, or tongue  · confusion, forgetfulness  · depression  · difficulty sleeping  · difficulty speaking  · feeling faint or lightheaded, falls  · mood changes, excitability or aggressive behavior  · muscle cramps  · trouble passing urine or change in the amount of urine  · unusually weak or tired  Side effects that usually do not require medical attention (report to your doctor or health care professional if they continue or are bothersome):  · change in sex drive or performance  · changes in appetite  This list may not describe all possible side effects. Call your doctor for medical advice about side effects. You may report side effects to FDA at 9-079-FDA-1272.  Where should I keep my medicine?  Keep out of the reach of children. This medicine can be  abused. Keep your medicine in a safe place to protect it from theft. Do not share this medicine with anyone. Selling or giving away this medicine is dangerous and against the law.  Store at room temperature between 20 and 25 degrees C (68 and 77 degrees F). This medicine may cause accidental overdose and death if taken by other adults, children, or pets. Mix any unused medicine with a substance like cat litter or coffee grounds. Then throw the medicine away in a sealed container like a sealed bag or a coffee can with a lid. Do not use the medicine after the expiration date.  NOTE: This sheet is a summary. It may not cover all possible information. If you have questions about this medicine, talk to your doctor, pharmacist, or health care provider.  NOTE:This sheet is a summary. It may not cover all possible information. If you have questions about this medicine, talk to your doctor, pharmacist, or health care provider. Copyright© 2016 Gold Standard

## 2017-01-17 DIAGNOSIS — J44.9 COPD, SEVERE: Chronic | ICD-10-CM

## 2017-01-17 RX ORDER — IPRATROPIUM BROMIDE AND ALBUTEROL SULFATE 2.5; .5 MG/3ML; MG/3ML
3 SOLUTION RESPIRATORY (INHALATION)
Qty: 270 ML | Refills: 11 | Status: SHIPPED | OUTPATIENT
Start: 2017-01-17 | End: 2017-01-18 | Stop reason: SDUPTHER

## 2017-01-18 RX ORDER — IPRATROPIUM BROMIDE AND ALBUTEROL SULFATE 2.5; .5 MG/3ML; MG/3ML
3 SOLUTION RESPIRATORY (INHALATION)
Qty: 270 ML | Refills: 11 | Status: SHIPPED | OUTPATIENT
Start: 2017-01-18 | End: 2018-02-06 | Stop reason: SDUPTHER

## 2017-01-20 ENCOUNTER — PROCEDURE VISIT (OUTPATIENT)
Dept: OPHTHALMOLOGY | Facility: CLINIC | Age: 82
End: 2017-01-20
Payer: MEDICARE

## 2017-01-20 DIAGNOSIS — H35.3210 AGE-RELATED MACULAR DEGENERATION, WET, RIGHT EYE: Primary | ICD-10-CM

## 2017-01-20 PROCEDURE — 92134 CPTRZ OPH DX IMG PST SGM RTA: CPT | Mod: PBBFAC,PO | Performed by: OPHTHALMOLOGY

## 2017-01-20 PROCEDURE — 67028 INJECTION EYE DRUG: CPT | Mod: PBBFAC,PO,RT | Performed by: OPHTHALMOLOGY

## 2017-01-20 PROCEDURE — 67028 INJECTION EYE DRUG: CPT | Mod: S$PBB,RT,, | Performed by: OPHTHALMOLOGY

## 2017-01-20 PROCEDURE — 99499 UNLISTED E&M SERVICE: CPT | Mod: S$PBB,,, | Performed by: OPHTHALMOLOGY

## 2017-01-20 RX ADMIN — AFLIBERCEPT 2 MG: 40 INJECTION, SOLUTION INTRAVITREAL at 01:01

## 2017-01-20 NOTE — PROGRESS NOTES
===============================  Kelton Mcdaniels,   89 y.o. male   Last visit [facility]: :12/15/2016   [facility] [place]  [facility]:  Corrected distance visual acuity was 20/400 -1 in the right eye and 20/40 -2 in the left eye.  Tonometry     Tonometry (Applanation, 1:19 PM)      Right Left   Pressure 16                   Not recorded         Not recorded        Chief Complaint   Patient presents with    Macular Degeneration     Eylea OD # 2 since worse        HPI     Macular Degeneration    Additional comments: Eylea OD # 2 since worse           Comments   --Smd/srn od dx. 10/5/15   --2+ vac cat ou      EYLEA OD 12/15/16  H/o Avastin          Last edited by CRESCENCIO Phillips MD on 1/20/2017  1:10 PM. (History)      Read Studies: y  Vitalsy    ________________  1/20/2017  1. Age-related macular degeneration, wet, right eye      Oct better, no better visually  1/20/2017  Diagnosis :  od srn #2  Today:   Eylea (afibercept) 2 mg/0.05 ml Intravitreal Injection , OD   Follow up: rtc 1  Mo fro eyel #3         Call 24/7 for any worsening of vision. Check  OU QD. Gave my home phone number.      Procedure  Note:   OD}  Eylea (afibercept) 2 mg/0.05 ml Intravitreal Injection    I have explained the Risks, Benefits and Alternatives of the procedure in detail.  The patient voices understanding and all questions have been answered.  The patient agrees to proceed as discussed.  LIDOCAINE 2%  subconj bleb  was used for anesthesia.  Topical betadine was used for antisepsis.  0.05 cc was  injected 3.7 mm from corneal limbus in the inferotemporal quadrant.  Following injection the IOP was less than thirty (<30) by tonopen.  The eye was then thoroughly irrigated with BSS.  Patient tolerated procedure well.  No complications were observed.  The Patient was educated that mild irritation tonight was normal secondary to topical antispsis use.  Pt was advised to call at any time day or night for pain, redness, or any decline in vision.  I gave the patient my home number as well as the clinic on call number. Daily visual checks and Amsler grid testing were reviewed.  ciloxan Antibiotic Drops to be used 4 times daily for 4 days  CRESCENCIO Phillips MD  Procedure ordered: y  Consent: y  Pre auth: y  MAR:y  Opnote: y  Charge capture:y  Sided procedure note: y  .       ===========================

## 2017-01-20 NOTE — MR AVS SNAPSHOT
[facility]  [address] [facility] [place] [address]  Phone: [phone]  Fax: [phone]                  Kelton Mcdaniels   2017 1:30 PM   Procedure visit    Description:  Male : 1/3/1928   Provider:  CRESCENCIO Phillips MD   Department:  [facility]           Reason for Visit     Macular Degeneration           Diagnoses this Visit        Comments    Age-related macular degeneration, wet, right eye    -  Primary            To Do List           Future Appointments        Provider Department Dept Phone    2017 1:30 PM CRESCENCIO Phillips MD [facility] - Ophthalmology [phone]    2017 9:00 AM PULMONARY DISEASE MANAGEMENT ON [facility] - Pulm Function [facility] [phone]    2017 9:40 AM LABORATORY, [facility] - Laboratory [phone]    2017 8:40 AM Sunny Plummer MD [facility] Internal Medicine [phone]    2017 9:00 AM ONL XR1- [facility] [phone]      Goals (5 Years of Data)     None      Follow-Up and Disposition     Return in about 1 month (around 2017).      [facility]     [facility] Care [facility] -  Assistance  Registered nurses in the [facility] provide clinical advisement, health education, appointment booking, and other advisory services.  Call for this free service at [phone].             Medications           Message regarding Medications     Verify the changes and/or additions to your medication regime listed below are the same as discussed with your clinician today.  If any of these changes or additions are incorrect, please notify your healthcare provider.        These medications were administered today        Dose Freq    aflibercept Soln 2 mg 2 mg Clinic/HOD 1 time    Si.05 mLs (2 mg total) by Intravitreal route one time.    Class: Normal    Route: Intravitreal           Verify that the below list of medications is an accurate representation of the medications you are  currently taking.  If none reported, the list may be blank. If incorrect, please contact your healthcare provider. Carry this list with you in case of emergency.           Current Medications     albuterol (PROAIR HFA) 90 mcg/actuation inhaler Inhale 2 puffs into the lungs every 4 (four) hours as needed.    albuterol-ipratropium 2.5mg-0.5mg/3mL (DUO-NEB) 0.5 mg-3 mg(2.5 mg base)/3 mL nebulizer solution Take 3 mLs by nebulization every 6 (six) hours while awake. DX: J44.9    alprazolam (XANAX) 0.25 MG tablet Take 1 tablet (0.25 mg total) by mouth daily as needed for Anxiety. For panic attacks    carvedilol (COREG) 25 MG tablet TAKE ONE TABLET BY MOUTH TWICE DAILY    doxycycline (MONODOX) 100 MG capsule Take 1 capsule (100 mg total) by mouth 2 (two) times daily.    fluticasone-salmeterol 250-50 mcg/dose (ADVAIR DISKUS) 250-50 mcg/dose diskus inhaler INHALE ONE DOSE BY MOUTH TWICE DAILY    hydrochlorothiazide (HYDRODIURIL) 25 MG tablet Take 1 tablet (25 mg total) by mouth once daily.    mucus clearing device (ACAPELLA) Sariah 1 Device by Misc.(Non-Drug; Combo Route) route every 4 to 6 hours as needed.    omeprazole (PRILOSEC) 20 MG capsule Take 1 capsule (20 mg total) by mouth once daily.    predniSONE (DELTASONE) 20 MG tablet 3 tab for 3 days. 2 tab for 3 days. 1 tab for 3 days. 1/2 tab for 3 days    roflumilast (DALIRESP) 500 mcg Tab Take 1 tablet (500 mcg total) by mouth once daily.    simvastatin (ZOCOR) 40 MG tablet Take 1 tablet (40 mg total) by mouth every evening.    tiotropium bromide 2.5 mcg/actuation Mist Inhale 2.5 mcg into the lungs once daily.    butenafine 1 % cream Apply topically 2 (two) times daily.    gabapentin (NEURONTIN) 100 MG capsule Take 1 capsule (100 mg total) by mouth every evening.    nitroGLYCERIN (NITROSTAT) 0.4 MG SL tablet Place 1 tablet (0.4 mg total) under the tongue every 5 (five) minutes as needed for Chest pain.           Clinical Reference Information           Allergies as of  1/20/2017     No Known Allergies      Immunizations Administered on Date of Encounter - 1/20/2017     None      Orders Placed During Today's Visit      Normal Orders This Visit    Posterior Segment OCT Retina-Both eyes     Prior Authorization Order       [facility] Sign-Up     Activating your [facility] account is as easy as 1-2-3!     1) Visit [email or web address], select Sign Up Now, enter this activation code and your date of birth, then select Next.  [email or web address]  Expires: 2/18/2017 10:18 AM      2) Create a username and password to use when you visit [facility] in the future and select a security question in case you lose your password and select Next.    3) Enter your e-mail address and click Sign Up!    Additional Information  If you have questions, please e-mail [email or web address] or call [phone] to talk to our [facility] staff. Remember, [facility] is NOT to be used for urgent needs. For medical emergencies, dial 911.

## 2017-01-25 ENCOUNTER — TELEPHONE (OUTPATIENT)
Dept: INTERNAL MEDICINE | Facility: CLINIC | Age: 82
End: 2017-01-25

## 2017-01-25 ENCOUNTER — HOSPITAL ENCOUNTER (EMERGENCY)
Facility: HOSPITAL | Age: 82
Discharge: HOME | End: 2017-01-25
Attending: EMERGENCY MEDICINE
Payer: MEDICARE

## 2017-01-25 VITALS
SYSTOLIC BLOOD PRESSURE: 138 MMHG | DIASTOLIC BLOOD PRESSURE: 80 MMHG | HEART RATE: 80 BPM | RESPIRATION RATE: 20 BRPM | TEMPERATURE: 98 F | WEIGHT: 180 LBS | OXYGEN SATURATION: 99 % | BODY MASS INDEX: 28.25 KG/M2 | HEIGHT: 67 IN

## 2017-01-25 DIAGNOSIS — Z99.81 SUPPLEMENTAL OXYGEN DEPENDENT: ICD-10-CM

## 2017-01-25 DIAGNOSIS — K92.1 HEMATOCHEZIA: ICD-10-CM

## 2017-01-25 DIAGNOSIS — Z85.51 HISTORY OF BLADDER CANCER: ICD-10-CM

## 2017-01-25 DIAGNOSIS — R31.0 HEMATURIA, GROSS: Primary | ICD-10-CM

## 2017-01-25 LAB
ANION GAP SERPL CALC-SCNC: 10 MMOL/L
BACTERIA #/AREA URNS HPF: ABNORMAL /HPF
BASOPHILS # BLD AUTO: 0.01 K/UL
BASOPHILS # BLD AUTO: 0.02 K/UL
BASOPHILS NFR BLD: 0.1 %
BASOPHILS NFR BLD: 0.3 %
BILIRUB UR QL STRIP: ABNORMAL
BUN SERPL-MCNC: 9 MG/DL
CALCIUM SERPL-MCNC: 9.3 MG/DL
CHLORIDE SERPL-SCNC: 95 MMOL/L
CLARITY UR: CLEAR
CO2 SERPL-SCNC: 31 MMOL/L
COLOR UR: ABNORMAL
CREAT SERPL-MCNC: 0.8 MG/DL
DIFFERENTIAL METHOD: ABNORMAL
DIFFERENTIAL METHOD: ABNORMAL
EOSINOPHIL # BLD AUTO: 0.2 K/UL
EOSINOPHIL # BLD AUTO: 0.3 K/UL
EOSINOPHIL NFR BLD: 2.6 %
EOSINOPHIL NFR BLD: 3.6 %
ERYTHROCYTE [DISTWIDTH] IN BLOOD BY AUTOMATED COUNT: 12.1 %
ERYTHROCYTE [DISTWIDTH] IN BLOOD BY AUTOMATED COUNT: 12.7 %
EST. GFR  (AFRICAN AMERICAN): >60 ML/MIN/1.73 M^2
EST. GFR  (NON AFRICAN AMERICAN): >60 ML/MIN/1.73 M^2
GLUCOSE SERPL-MCNC: 130 MG/DL
GLUCOSE UR QL STRIP: NEGATIVE
HCT VFR BLD AUTO: 42.1 %
HCT VFR BLD AUTO: 42.3 %
HGB BLD-MCNC: 14.2 G/DL
HGB BLD-MCNC: 14.9 G/DL
HGB UR QL STRIP: ABNORMAL
HYALINE CASTS #/AREA URNS LPF: 0 /LPF
KETONES UR QL STRIP: NEGATIVE
LEUKOCYTE ESTERASE UR QL STRIP: ABNORMAL
LYMPHOCYTES # BLD AUTO: 1.8 K/UL
LYMPHOCYTES # BLD AUTO: 1.9 K/UL
LYMPHOCYTES NFR BLD: 22 %
LYMPHOCYTES NFR BLD: 23.1 %
MCH RBC QN AUTO: 32.6 PG
MCH RBC QN AUTO: 33.2 PG
MCHC RBC AUTO-ENTMCNC: 33.6 %
MCHC RBC AUTO-ENTMCNC: 35.4 %
MCV RBC AUTO: 94 FL
MCV RBC AUTO: 97 FL
MICROSCOPIC COMMENT: ABNORMAL
MONOCYTES # BLD AUTO: 0.4 K/UL
MONOCYTES # BLD AUTO: 0.7 K/UL
MONOCYTES NFR BLD: 4.9 %
MONOCYTES NFR BLD: 8.6 %
NEUTROPHILS # BLD AUTO: 5 K/UL
NEUTROPHILS # BLD AUTO: 6.2 K/UL
NEUTROPHILS NFR BLD: 64.4 %
NEUTROPHILS NFR BLD: 70.4 %
NITRITE UR QL STRIP: NEGATIVE
PH UR STRIP: 8 [PH] (ref 5–8)
PLATELET # BLD AUTO: 157 K/UL
PLATELET # BLD AUTO: 180 K/UL
PMV BLD AUTO: 10 FL
PMV BLD AUTO: 10 FL
POTASSIUM SERPL-SCNC: 3.2 MMOL/L
PROT UR QL STRIP: ABNORMAL
RBC # BLD AUTO: 4.36 M/UL
RBC # BLD AUTO: 4.49 M/UL
RBC #/AREA URNS HPF: >100 /HPF (ref 0–4)
SODIUM SERPL-SCNC: 136 MMOL/L
SP GR UR STRIP: 1.01 (ref 1–1.03)
SQUAMOUS #/AREA URNS HPF: 2 /HPF
URN SPEC COLLECT METH UR: ABNORMAL
UROBILINOGEN UR STRIP-ACNC: 1 EU/DL
WBC # BLD AUTO: 7.71 K/UL
WBC # BLD AUTO: 8.81 K/UL
WBC #/AREA URNS HPF: 30 /HPF (ref 0–5)

## 2017-01-25 PROCEDURE — 25000003 PHARM REV CODE 250: Performed by: EMERGENCY MEDICINE

## 2017-01-25 PROCEDURE — 99283 EMERGENCY DEPT VISIT LOW MDM: CPT | Mod: 25

## 2017-01-25 PROCEDURE — 80048 BASIC METABOLIC PNL TOTAL CA: CPT

## 2017-01-25 PROCEDURE — 96360 HYDRATION IV INFUSION INIT: CPT

## 2017-01-25 PROCEDURE — 81000 URINALYSIS NONAUTO W/SCOPE: CPT

## 2017-01-25 PROCEDURE — 85025 COMPLETE CBC W/AUTO DIFF WBC: CPT

## 2017-01-25 RX ADMIN — SODIUM CHLORIDE 500 ML: 0.9 INJECTION, SOLUTION INTRAVENOUS at 10:01

## 2017-01-25 RX ADMIN — SODIUM CHLORIDE 500 ML: 0.9 INJECTION, SOLUTION INTRAVENOUS at 12:01

## 2017-01-25 NOTE — ED AVS SNAPSHOT
[facility] - [address] [address]               Kelton Mcdaniels   2017 10:24 AM   ED    Description:  Male : 12/3/1928   Department:  [facility]           Your Care was Coordinated By:     Provider Role From To    Celia Walker MD Attending Provider 17 1031 --      Reason for Visit     Hematuria           Diagnoses this Visit        Comments    Hematuria, gross    -  Primary     History of bladder cancer         Supplemental oxygen dependent         Hematochezia           ED Disposition     None           To Do List           Follow-up Information     Follow up with [facility]. Schedule an appointment as soon as possible for a visit in 2 days.    Specialty:  Urology    Why:  Return to the Emergency Room, If symptoms worsen    Contact information:    [address] [place] [address]  [phone]        Follow up with Sunny Plummer MD. Schedule an appointment as soon as possible for a visit in 2 days.    Specialty:  Internal Medicine    Contact information:    [address]  [address]  [facility] [address]  [phone]        [facility]     [facility] Care [facility] -  Assistance  Registered nurses in the [facility] provide clinical advisement, health education, appointment booking, and other advisory services.  Call for this free service at [phone].             Medications           Message regarding Medications     Verify the changes and/or additions to your medication regime listed below are the same as discussed with your clinician today.  If any of these changes or additions are incorrect, please notify your healthcare provider.        These medications were administered today        Dose Freq    sodium chloride 0.9% bolus 500 mL 500 mL ED 1 Time    Sig: Inject 500 mLs into the vein ED 1 Time.    Class: Normal    Route: Intravenous    sodium chloride  0.9% bolus 500 mL 500 mL ED 1 Time    Sig: Inject 500 mLs into the vein ED 1 Time.    Class: Normal    Route: Intravenous           Verify that the below list of medications is an accurate representation of the medications you are currently taking.  If none reported, the list may be blank. If incorrect, please contact your healthcare provider. Carry this list with you in case of emergency.           Current Medications     albuterol (PROAIR HFA) 90 mcg/actuation inhaler Inhale 2 puffs into the lungs every 4 (four) hours as needed.    albuterol-ipratropium 2.5mg-0.5mg/3mL (DUO-NEB) 0.5 mg-3 mg(2.5 mg base)/3 mL nebulizer solution Take 3 mLs by nebulization every 6 (six) hours while awake. DX: J44.9    alprazolam (XANAX) 0.25 MG tablet Take 1 tablet (0.25 mg total) by mouth daily as needed for Anxiety. For panic attacks    butenafine 1 % cream Apply topically 2 (two) times daily.    carvedilol (COREG) 25 MG tablet TAKE ONE TABLET BY MOUTH TWICE DAILY    doxycycline (MONODOX) 100 MG capsule Take 1 capsule (100 mg total) by mouth 2 (two) times daily.    fluticasone-salmeterol 250-50 mcg/dose (ADVAIR DISKUS) 250-50 mcg/dose diskus inhaler INHALE ONE DOSE BY MOUTH TWICE DAILY    gabapentin (NEURONTIN) 100 MG capsule Take 1 capsule (100 mg total) by mouth every evening.    hydrochlorothiazide (HYDRODIURIL) 25 MG tablet Take 1 tablet (25 mg total) by mouth once daily.    mucus clearing device (ACAPELLA) Sariah 1 Device by Misc.(Non-Drug; Combo Route) route every 4 to 6 hours as needed.    nitroGLYCERIN (NITROSTAT) 0.4 MG SL tablet Place 1 tablet (0.4 mg total) under the tongue every 5 (five) minutes as needed for Chest pain.    omeprazole (PRILOSEC) 20 MG capsule Take 1 capsule (20 mg total) by mouth once daily.    predniSONE (DELTASONE) 20 MG tablet 3 tab for 3 days. 2 tab for 3 days. 1 tab for 3 days. 1/2 tab for 3 days    roflumilast (DALIRESP) 500 mcg Tab Take 1 tablet (500 mcg total) by mouth once daily.    simvastatin  "(ZOCOR) 40 MG tablet Take 1 tablet (40 mg total) by mouth every evening.    tiotropium bromide 2.5 mcg/actuation Mist Inhale 2.5 mcg into the lungs once daily.           Clinical Reference Information           Your Vitals Were     BP Pulse Temp Resp Height Weight    143/80 (BP Location: Right arm, Patient Position: Lying, BP Method: Automatic) 78 98.3 °F (36.8 °C) (Oral) 22 5' 7" (1.702 m) 81.6 kg (180 lb)    SpO2 BMI             99% 28.19 kg/m2         Allergies as of 1/25/2017        Reactions    No Known Allergies       Immunizations Administered on Date of Encounter - 1/25/2017     None      ED Micro, Lab, POCT     Start Ordered       Status Ordering Provider    01/25/17 1400 01/25/17 1159    STAT,   Status:  Canceled      Canceled     01/25/17 1400 01/25/17 1209  CBC auto differential  STAT      Final result     01/25/17 1205 01/25/17 1204    Add-on,   Status:  Canceled      Canceled     01/25/17 1038 01/25/17 1037  CBC auto differential  STAT      Final result     01/25/17 1038 01/25/17 1037  Basic metabolic panel  STAT      Final result     01/25/17 1038 01/25/17 1037  Urinalysis Clean Catch  STAT      Final result     01/25/17 1037 01/25/17 1037  Urinalysis Microscopic  Once      Final result       ED Imaging Orders     None      Discharge References/Attachments     HEMATURIA (ENGLISH)    HEMATURIA: POSSIBLE CAUSES (ENGLISH)    LOWER GI BLEEDING (STABLE) (ENGLISH)      Your Scheduled Appointments     Feb 01, 2017  1:20 PM CST   Established Patient Visit with Sunny Plummer MD   [place] - Internal Medicine ([place]    [address] [place] [address] [phone]            Feb 14, 2017  9:00 AM CST   CLINIC PATIENT with PULMONARY DISEASE MANAGEMENT [facility]   [facility] - Pulm Function [facility] ([facility])    [address] [address] [phone]            Feb 20, 2017  9:40 AM CST   Fasting Lab with LABORATORY, [facility] - Laboratory ([facility])    [address] [place] [address] " [facility] [address] [phone]            Feb 27, 2017  8:40 AM CST   Established Patient Visit with Sunny Plummer MD   [place] - Internal Medicine ([place])    [address] [facility] [address] [phone]            Mar 01, 2017  1:15 PM CST   Procedure with CRESCENCIO Phillips MD   [facility] - Ophthalmology ([facility])    [address] [facility] [address] [phone]              [facility] Sign-Up     Activating your [facility] account is as easy as 1-2-3!     1) Visit [email or web address], select Sign Up Now, enter this activation code and your date of birth, then select Next.  [email or web address]  Expires: 2/18/2017 10:18 AM      2) Create a username and password to use when you visit [facility] in the future and select a security question in case you lose your password and select Next.    3) Enter your e-mail address and click Sign Up!    Additional Information  If you have questions, please e-mail [email or web address] or call [phone] to talk to our [facility] staff. Remember, [facility] is NOT to be used for urgent needs. For medical emergencies, dial 911.         Smoking Cessation     If you would like to quit smoking:   You may be eligible for free services if you are a [place] resident and started smoking cigarettes before September 1, 1988.  Call the Smoking Cessation Trust (SCT) toll free at (476) 158-3134 or (258) 257-9669.   Call 1-800-QUIT-NOW if you do not meet the above criteria.             [facility] complies with applicable Federal civil rights laws and does not discriminate on the basis of race, color, national origin, age, disability, or sex.        Language Assistance Services     ATTENTION: Language assistance services are available, free of charge. Please call [phone].      ATENCIÓN: Si habla español, tiene a segal disposición servicios gratuitos de asistencia lingüística. Llame al [phone].     CHÚ Ý: N?u b?n nói Ti?ng Vi?t, có các  d?ch v? h? tr? hansa whitehead? mi?n phí dành cho b?n. G?i s? [phone].

## 2017-01-25 NOTE — ED PROVIDER NOTES
SCRIBE #1 NOTE: I, Olga Sandy, am scribing for, and in the presence of, Celia Walker MD. I have scribed the entire note.      History      Chief Complaint   Patient presents with    Hematuria     pt. reports hematuria, hx. of bladder cancer        Review of patient's allergies indicates:   Allergen Reactions    No known allergies         HPI   HPI    1/25/2017, 10:33 AM   History obtained from the patient      History of Present Illness: Kelton Mcdaniels is a 88 y.o. male patient who presents to the Emergency Department for hematuria which onset 3 weeks ago, but began worsening a few days ago. Pt reports passing bright red blood with blood clots. Sx have been episodic and moderate in severity. No modifying factors noted. No associated sx included. Pt denies any fever, chills, abdominal pain, n/v/d, difficulty urinating, or dysuria. No further complaints at this time.       Arrival mode: Personal vehicle     PCP: Sunny Plummer MD       Past Medical History:  Past Medical History   Diagnosis Date    Atrial fibrillation, chronic     CAD in native artery 12/18/2015    Cataract     Chronic airway obstruction, not elsewhere classified     Coronary atherosclerosis of unspecified type of vessel, native or graft     Esophageal reflux     Essential hypertension     Heart disease     History of bladder cancer     History of colon cancer     Hyperlipidemia LDL goal <70     Hypertrophy of prostate without urinary obstruction and other lower urinary tract symptoms (LUTS)     Impaired fasting glucose     Macular degeneration     Pulmonary hypertension 12/18/2015    TR (tricuspid regurgitation) 12/18/2015       Past Surgical History:  Past Surgical History   Procedure Laterality Date    Partial coloectomy for large polyp      Coronary artery bypass graft      Colon surgery      Back surgery           Family History:  Family History   Problem Relation Age of Onset    Heart failure Mother      Diabetes Father     Cancer Brother     Asthma Paternal Uncle        Social History:  Social History     Social History Main Topics    Smoking status: Former Smoker     Packs/day: 2.00     Years: 70.00     Types: Cigarettes    Smokeless tobacco: Never Used    Alcohol use No    Drug use: No    Sexual activity: No       ROS   Review of Systems   Constitutional: Negative for chills, diaphoresis and fever.   HENT: Negative for sore throat.    Respiratory: Negative for shortness of breath.    Cardiovascular: Negative for chest pain.   Gastrointestinal: Negative for abdominal pain, blood in stool, constipation, diarrhea, nausea and vomiting.   Genitourinary: Positive for hematuria. Negative for decreased urine volume, difficulty urinating, dysuria and flank pain.   Musculoskeletal: Negative for back pain.   Skin: Negative for rash.   Neurological: Negative for dizziness, weakness, light-headedness, numbness and headaches.   Hematological: Does not bruise/bleed easily.       Physical Exam    Initial Vitals   BP Pulse Resp Temp SpO2   01/25/17 1022 01/25/17 1022 01/25/17 1022 01/25/17 1022 01/25/17 1022   177/96 109 22 98.3 °F (36.8 °C) 95 %      Physical Exam  Nursing Notes and Vital Signs Reviewed.  Constitutional: Patient is in no acute distress. Awake and alert. Well-developed and well-nourished.  Head: Atraumatic. Normocephalic.  Eyes: PERRL. EOM intact. Conjunctivae are not pale. No scleral icterus.  ENT: Mucous membranes are moist. Oropharynx is clear and symmetric.    Neck: Supple. Full ROM. No lymphadenopathy.  Cardiovascular: Regular rate. Regular rhythm. No murmurs, rubs, or gallops. Distal pulses are 2+ and symmetric.  Pulmonary/Chest: No respiratory distress. Clear to auscultation bilaterally. No wheezing, rales, or rhonchi.  Abdominal: Soft and non-distended.  There is no tenderness.  No rebound, guarding, or rigidity.    Musculoskeletal: Moves all extremities. No obvious deformities.   Skin: Warm and  "dry.  Neurological:  Alert, awake, and appropriate.  Normal speech.  No acute focal neurological deficits are appreciated.  Psychiatric: Normal affect. Good eye contact. Appropriate in content.    ED Course    Procedures  ED Vital Signs:  Vitals:    01/25/17 1022 01/25/17 1106 01/25/17 1205 01/25/17 1309   BP: (!) 177/96  (!) 119/55 (!) 143/80   Pulse: 109 110 77 78   Resp: (!) 22  (!) 22    Temp: 98.3 °F (36.8 °C)      TempSrc: Oral      SpO2: 95%  98% 99%   Weight: 81.6 kg (180 lb)      Height: 5' 7" (1.702 m)       01/25/17 1458   BP: 138/80   Pulse: 80   Resp: 20   Temp: 98.4 °F (36.9 °C)   TempSrc: Oral   SpO2: 99%   Weight:    Height:        Abnormal Lab Results:  Labs Reviewed   CBC W/ AUTO DIFFERENTIAL - Abnormal; Notable for the following:        Result Value    RBC 4.49 (*)     MCH 33.2 (*)     All other components within normal limits   BASIC METABOLIC PANEL - Abnormal; Notable for the following:     Potassium 3.2 (*)     CO2 31 (*)     Glucose 130 (*)     All other components within normal limits   URINALYSIS - Abnormal; Notable for the following:     Color, UA Red (*)     Protein, UA 2+ (*)     Bilirubin (UA) 1+ (*)     Occult Blood UA 3+ (*)     Leukocytes, UA Trace (*)     All other components within normal limits   URINALYSIS MICROSCOPIC - Abnormal; Notable for the following:     RBC, UA >100 (*)     WBC, UA 30 (*)     All other components within normal limits   CBC W/ AUTO DIFFERENTIAL - Abnormal; Notable for the following:     RBC 4.36 (*)     MCH 32.6 (*)     All other components within normal limits        All Lab Results:  Results for orders placed or performed during the hospital encounter of 01/25/17   CBC auto differential   Result Value Ref Range    WBC 8.81 3.90 - 12.70 K/uL    RBC 4.49 (L) 4.60 - 6.20 M/uL    Hemoglobin 14.9 14.0 - 18.0 g/dL    Hematocrit 42.1 40.0 - 54.0 %    MCV 94 82 - 98 fL    MCH 33.2 (H) 27.0 - 31.0 pg    MCHC 35.4 32.0 - 36.0 %    RDW 12.7 11.5 - 14.5 %    Platelets " 157 150 - 350 K/uL    MPV 10.0 9.2 - 12.9 fL    Gran # 6.2 1.8 - 7.7 K/uL    Lymph # 1.9 1.0 - 4.8 K/uL    Mono # 0.4 0.3 - 1.0 K/uL    Eos # 0.2 0.0 - 0.5 K/uL    Baso # 0.01 0.00 - 0.20 K/uL    Gran% 70.4 38.0 - 73.0 %    Lymph% 22.0 18.0 - 48.0 %    Mono% 4.9 4.0 - 15.0 %    Eosinophil% 2.6 0.0 - 8.0 %    Basophil% 0.1 0.0 - 1.9 %    Differential Method Automated    Basic metabolic panel   Result Value Ref Range    Sodium 136 136 - 145 mmol/L    Potassium 3.2 (L) 3.5 - 5.1 mmol/L    Chloride 95 95 - 110 mmol/L    CO2 31 (H) 23 - 29 mmol/L    Glucose 130 (H) 70 - 110 mg/dL    BUN, Bld 9 8 - 23 mg/dL    Creatinine 0.8 0.5 - 1.4 mg/dL    Calcium 9.3 8.7 - 10.5 mg/dL    Anion Gap 10 8 - 16 mmol/L    eGFR if African American >60 >60 mL/min/1.73 m^2    eGFR if non African American >60 >60 mL/min/1.73 m^2   Urinalysis Clean Catch   Result Value Ref Range    Specimen UA Urine, Clean Catch     Color, UA Red (A) Yellow, Straw, Purnima    Appearance, UA Clear Clear    pH, UA 8.0 5.0 - 8.0    Specific Gravity, UA 1.010 1.005 - 1.030    Protein, UA 2+ (A) Negative    Glucose, UA Negative Negative    Ketones, UA Negative Negative    Bilirubin (UA) 1+ (A) Negative    Occult Blood UA 3+ (A) Negative    Nitrite, UA Negative Negative    Urobilinogen, UA 1.0 <2.0 EU/dL    Leukocytes, UA Trace (A) Negative   Urinalysis Microscopic   Result Value Ref Range    RBC, UA >100 (H) 0 - 4 /hpf    WBC, UA 30 (H) 0 - 5 /hpf    Bacteria, UA Occasional None-Occ /hpf    Squam Epithel, UA 2 /hpf    Hyaline Casts, UA 0 0-1/lpf /lpf    Microscopic Comment SEE COMMENT    CBC auto differential   Result Value Ref Range    WBC 7.71 3.90 - 12.70 K/uL    RBC 4.36 (L) 4.60 - 6.20 M/uL    Hemoglobin 14.2 14.0 - 18.0 g/dL    Hematocrit 42.3 40.0 - 54.0 %    MCV 97 82 - 98 fL    MCH 32.6 (H) 27.0 - 31.0 pg    MCHC 33.6 32.0 - 36.0 %    RDW 12.1 11.5 - 14.5 %    Platelets 180 150 - 350 K/uL    MPV 10.0 9.2 - 12.9 fL    Gran # 5.0 1.8 - 7.7 K/uL    Lymph # 1.8  1.0 - 4.8 K/uL    Mono # 0.7 0.3 - 1.0 K/uL    Eos # 0.3 0.0 - 0.5 K/uL    Baso # 0.02 0.00 - 0.20 K/uL    Gran% 64.4 38.0 - 73.0 %    Lymph% 23.1 18.0 - 48.0 %    Mono% 8.6 4.0 - 15.0 %    Eosinophil% 3.6 0.0 - 8.0 %    Basophil% 0.3 0.0 - 1.9 %    Differential Method Automated          Imaging Results:  Imaging Results     None                  The Emergency Provider reviewed the vital signs and test results, which are outlined above.    ED Discussion     11:55 PM: Re-evaluated pt. Pt had an episode of rectal bleeding (bright red blood with clots) in ED bed. Pt states that he has had similar episodes in the past. D/w pt all pertinent results. D/w pt any concerns expressed at this time. Answered all questions. Pt expresses understanding at this time.  Rectal:  No tenderness.  No palpable masses.  No hemorrhoids.  Normal sphincter tone.  Bright red blood noted.     2:45 PM: Reassessed pt at this time. No acute changes in H&H. Pt has had no further bleeding from rectum. Discussed with pt all pertinent ED information and results. Discussed pt dx and plan of tx. Gave pt all f/u and return to the ED instructions. All questions and concerns were addressed at this time. Pt expresses understanding of information and instructions, and is comfortable with plan to discharge. Pt is stable for discharge.    ED Medication(s):  Medications   sodium chloride 0.9% bolus 500 mL (0 mLs Intravenous Stopped 1/25/17 1155)   sodium chloride 0.9% bolus 500 mL (0 mLs Intravenous Stopped 1/25/17 1310)       Follow-up Information     Follow up with [facility]. Schedule an appointment as soon as possible for a visit in 2 days.    Specialty:  Urology    Why:  Return to the Emergency Room, If symptoms worsen    Contact information:    [address]  [facility] [address] [phone]        Follow up with Sunny Plummer MD. Schedule an appointment as soon as possible for a visit in 2 days.    Specialty:  Internal  Medicine    Contact information:    [address]   [address]  [address] [place] [address]  [phone]            Pre-hypertension/Hypertension: The pt has been informed that they may have pre-hypertension or hypertension based on a blood pressure reading in the ED. I recommend that the pt call the PCP listed on their discharge instructions or a physician of their choice this week to arrange f/u for further evaluation of possible pre-hypertension or hypertension.     Medical Decision Making    Medical Decision Making:   Clinical Tests:   Lab Tests: Ordered and Reviewed           Scribe Attestation:   Scribe #1: I performed the above scribed service and the documentation accurately describes the services I performed. I attest to the accuracy of the note.    Attending:   Physician Attestation Statement for Scribe #1: I, Celia Walker MD, personally performed the services described in this documentation, as scribed by Olga Sandy, in my presence, and it is both accurate and complete.          Clinical Impression       ICD-10-CM ICD-9-CM   1. Hematuria, gross R31.0 599.71   2. History of bladder cancer Z85.51 V10.51   3. Supplemental oxygen dependent Z99.81 V46.2   4. Hematochezia K92.1 578.1       Disposition:   Disposition: Discharged  Condition: Stable         Celia Walker MD  01/25/17 9324

## 2017-01-25 NOTE — TELEPHONE ENCOUNTER
----- Message from Bell Aldridge sent at 1/25/2017  8:26 AM CST -----  Contact: ms mckeon-daughter  pls work pt in today for blood in urine/clotting, has 2/1 appt (needs referral to urologist)...[phone]

## 2017-01-25 NOTE — TELEPHONE ENCOUNTER
"S/w Mrs. Tesfaye. Pt c/o of " a lot of blood and blood clots in his urine. Its a lot ". I advised pt should be directed to the ER for eval. Verbalized understanding/TGD  "

## 2017-01-25 NOTE — ED NOTES
Patient sitting up in bed, pt continues to report bloody penile discharge and frequency of urination. Patient denies needs at this time, will continue to monitor.

## 2017-01-26 ENCOUNTER — TELEPHONE (OUTPATIENT)
Dept: UROLOGY | Facility: CLINIC | Age: 82
End: 2017-01-26

## 2017-01-31 ENCOUNTER — OFFICE VISIT (OUTPATIENT)
Dept: UROLOGY | Facility: CLINIC | Age: 82
End: 2017-01-31
Payer: MEDICARE

## 2017-01-31 VITALS — DIASTOLIC BLOOD PRESSURE: 74 MMHG | WEIGHT: 180 LBS | BODY MASS INDEX: 28.19 KG/M2 | SYSTOLIC BLOOD PRESSURE: 132 MMHG

## 2017-01-31 DIAGNOSIS — R31.9 HEMATURIA: Primary | ICD-10-CM

## 2017-01-31 PROCEDURE — 99204 OFFICE O/P NEW MOD 45 MIN: CPT | Mod: S$PBB,,, | Performed by: UROLOGY

## 2017-01-31 PROCEDURE — 99212 OFFICE O/P EST SF 10 MIN: CPT | Mod: PBBFAC | Performed by: UROLOGY

## 2017-01-31 PROCEDURE — 99999 PR PBB SHADOW E&M-EST. PATIENT-LVL II: CPT | Mod: PBBFAC,,, | Performed by: UROLOGY

## 2017-01-31 NOTE — MR AVS SNAPSHOT
[facility]  [address] [address]  Phone: [phone]  Fax: [phone]                  Kelton Mcdaniels   2017 3:00 PM   Office Visit    Description:  Male : 12/3/1928   Provider:  Freddy Ansari IV, MD   Department:  [facility]           Reason for Visit     Other           Diagnoses this Visit        Comments    Hematuria    -  Primary            To Do List           Future Appointments        Provider Department Dept Phone    2/3/2017 9:30 AM [facility] CT1 LIMIT 500 LBS [facility] -  [phone]    2017 1:00 PM Freddy Ansari IV, MD [facility] [phone]    2017 9:00 AM PULMONARY DISEASE MANAGEMENT [facility] - Pul Function [facility] [phone]    2017 9:40 AM LABORATORY, [facility] [phone]    2017 8:40 AM Sunny Plummer MD [facility] Medicine [phone]      Goals (5 Years of Data)     None      [facility]     [facility] Nurse Care Line -  Assistance  Registered nurses in the [facility] provide clinical advisement, health education, appointment booking, and other advisory services.  Call for this free service at [phone].             Medications           Message regarding Medications     Verify the changes and/or additions to your medication regime listed below are the same as discussed with your clinician today.  If any of these changes or additions are incorrect, please notify your healthcare provider.             Verify that the below list of medications is an accurate representation of the medications you are currently taking.  If none reported, the list may be blank. If incorrect, please contact your healthcare provider. Carry this list with you in case of emergency.           Current Medications     albuterol (PROAIR HFA) 90 mcg/actuation inhaler Inhale 2 puffs into the lungs every 4 (four) hours as needed.    albuterol-ipratropium  2.5mg-0.5mg/3mL (DUO-NEB) 0.5 mg-3 mg(2.5 mg base)/3 mL nebulizer solution Take 3 mLs by nebulization every 6 (six) hours while awake. DX: J44.9    alprazolam (XANAX) 0.25 MG tablet Take 1 tablet (0.25 mg total) by mouth daily as needed for Anxiety. For panic attacks    butenafine 1 % cream Apply topically 2 (two) times daily.    carvedilol (COREG) 25 MG tablet TAKE ONE TABLET BY MOUTH TWICE DAILY    doxycycline (MONODOX) 100 MG capsule Take 1 capsule (100 mg total) by mouth 2 (two) times daily.    fluticasone-salmeterol 250-50 mcg/dose (ADVAIR DISKUS) 250-50 mcg/dose diskus inhaler INHALE ONE DOSE BY MOUTH TWICE DAILY    gabapentin (NEURONTIN) 100 MG capsule Take 1 capsule (100 mg total) by mouth every evening.    hydrochlorothiazide (HYDRODIURIL) 25 MG tablet Take 1 tablet (25 mg total) by mouth once daily.    mucus clearing device (ACAPELLA) Sariah 1 Device by Misc.(Non-Drug; Combo Route) route every 4 to 6 hours as needed.    nitroGLYCERIN (NITROSTAT) 0.4 MG SL tablet Place 1 tablet (0.4 mg total) under the tongue every 5 (five) minutes as needed for Chest pain.    omeprazole (PRILOSEC) 20 MG capsule Take 1 capsule (20 mg total) by mouth once daily.    predniSONE (DELTASONE) 20 MG tablet 3 tab for 3 days. 2 tab for 3 days. 1 tab for 3 days. 1/2 tab for 3 days    roflumilast (DALIRESP) 500 mcg Tab Take 1 tablet (500 mcg total) by mouth once daily.    simvastatin (ZOCOR) 40 MG tablet Take 1 tablet (40 mg total) by mouth every evening.    tiotropium bromide 2.5 mcg/actuation Mist Inhale 2.5 mcg into the lungs once daily.           Clinical Reference Information           Vital Signs - Last Recorded  Most recent update: 1/31/2017  3:06 PM by Diane Acosta LPN    BP Wt BMI          132/74 (BP Location: Left arm, Patient Position: Sitting) 81.6 kg (180 lb) 28.19 kg/m2        Blood Pressure          Most Recent Value    BP  132/74      Allergies as of 1/31/2017     No Known Allergies      Immunizations Administered  on Date of Encounter - 1/31/2017     None      Orders Placed During Today's Visit      Normal Orders This Visit    POCT urine dipstick without microscope     Future Labs/Procedures Expected by Expires    CT Urogram Abd Pelvis W WO  1/31/2017 1/31/2018      [facility] Sign-Up     Activating your [facility] account is as easy as 1-2-3!     1) Visit [email or web address], select Sign Up Now, enter this activation code and your date of birth, then select Next.  [email or web address]  Expires: 2/18/2017 10:18 AM      2) Create a username and password to use when you visit [facility] in the future and select a security question in case you lose your password and select Next.    3) Enter your e-mail address and click Sign Up!    Additional Information  If you have questions, please e-mail [email or web address] or call [phone] to talk to our [facility] staff. Remember, [facility] is NOT to be used for urgent needs. For medical emergencies, dial 911.

## 2017-01-31 NOTE — PROGRESS NOTES
Chief Complaint: Gross Hematuria    HPI:   \1/31/17: 87 yo man last week had a day of dark blood with clots last week.  Was dx with bladder cancer 15+ years ago.  Some surveillance cystoscopy but none in the last 10 years. No abd/pelvic pain and no exac/rel factors.  No urolithiasis.  No urinary bother.  Had 6 wks BCG with a restaging resection after; maybe more BCG after that.  Was told to worry about it no more.  Also had a TURP in that same time frame.  And some gross hematuria attributed to his prostate in 2005.    Allergies:  No known allergies    Medications:  has a current medication list which includes the following prescription(s): albuterol, albuterol-ipratropium 2.5mg-0.5mg/3ml, alprazolam, butenafine, carvedilol, doxycycline, fluticasone-salmeterol 250-50 mcg/dose, gabapentin, hydrochlorothiazide, mucus clearing device, nitroglycerin, omeprazole, prednisone, roflumilast, simvastatin, and tiotropium bromide.    Review of Systems:  General: No fever, chills, fatigability, or weight loss.  Skin: No rashes, itching, or changes in color or texture of skin.  Chest: Denies CARBAJAL, cyanosis, wheezing, cough, and sputum production.  Abdomen: Appetite fine. No weight loss. Denies diarrhea, abdominal pain, hematemesis, or blood in stool.  Musculoskeletal: No joint stiffness or swelling. Denies back pain.  : As above.  All other review of systems negative.    PMH:   has a past medical history of Atrial fibrillation, chronic; CAD in native artery (12/18/2015); Cataract; Chronic airway obstruction, not elsewhere classified; Coronary atherosclerosis of unspecified type of vessel, native or graft; Esophageal reflux; Essential hypertension; Heart disease; History of bladder cancer; History of colon cancer; Hyperlipidemia LDL goal <70; Hypertrophy of prostate without urinary obstruction and other lower urinary tract symptoms (LUTS); Impaired fasting glucose; Macular degeneration; Pulmonary hypertension (12/18/2015); and TR  (tricuspid regurgitation) (12/18/2015).    PSH:   has a past surgical history that includes partial coloectomy for large polyp; Coronary artery bypass graft; Colon surgery; and Back surgery.    FamHx: family history includes Asthma in his paternal uncle; Cancer in his brother; Diabetes in his father; Heart failure in his mother.    SocHx:  reports that he has quit smoking. His smoking use included Cigarettes. He has a 140.00 pack-year smoking history. He has never used smokeless tobacco. He reports that he does not drink alcohol or use illicit drugs.      Physical Exam:  Vitals:    01/31/17 1505   BP: 132/74     General: A&Ox3, no apparent distress, no deformities  Neck: No masses, normal thyroid  Lungs: normal inspiration, no use of accessory muscles  Heart: normal pulse, no arrhythmias  Abdomen: Soft, NT, ND, no masses, no hernias, no hepatosplenomegaly  Lymphatic: Neck and groin nodes negative  Skin: The skin is warm and dry. No jaundice.  Ext: No c/c/e.  : deferred to cysto    Labs/Studies:   Urinalysis performed in clinic, summary: UA normal exc 50 blood    Impression/Plan:   1. Hematuria workup with CT URogram and RTC cysto.

## 2017-02-02 ENCOUNTER — TELEPHONE (OUTPATIENT)
Dept: UROLOGY | Facility: CLINIC | Age: 82
End: 2017-02-02

## 2017-02-02 NOTE — TELEPHONE ENCOUNTER
----- Message from Justyna Reyes sent at 2/2/2017  7:47 AM CST -----  Patient states that he has a CT scan scheduled at the hospital tomorrow, February 3 and he got a call last night telling him he needed to reschedule.  Call him at [phone] or [phone].                                                                     victor

## 2017-02-03 ENCOUNTER — HOSPITAL ENCOUNTER (OUTPATIENT)
Dept: RADIOLOGY | Facility: HOSPITAL | Age: 82
Discharge: HOME | End: 2017-02-03
Attending: UROLOGY
Payer: MEDICARE

## 2017-02-03 DIAGNOSIS — R31.9 HEMATURIA: ICD-10-CM

## 2017-02-03 PROCEDURE — 25500020 PHARM REV CODE 255: Performed by: UROLOGY

## 2017-02-03 PROCEDURE — 74178 CT ABD&PLV WO CNTR FLWD CNTR: CPT | Mod: TC

## 2017-02-03 RX ADMIN — IOHEXOL 75 ML: 350 INJECTION, SOLUTION INTRAVENOUS at 09:02

## 2017-02-06 ENCOUNTER — OFFICE VISIT (OUTPATIENT)
Dept: URGENT CARE | Facility: CLINIC | Age: 82
End: 2017-02-06
Payer: MEDICARE

## 2017-02-06 VITALS
SYSTOLIC BLOOD PRESSURE: 130 MMHG | WEIGHT: 175.5 LBS | OXYGEN SATURATION: 95 % | HEIGHT: 67 IN | DIASTOLIC BLOOD PRESSURE: 70 MMHG | TEMPERATURE: 98 F | BODY MASS INDEX: 27.55 KG/M2 | HEART RATE: 87 BPM | RESPIRATION RATE: 20 BRPM

## 2017-02-06 DIAGNOSIS — R52 BODY ACHES: Primary | ICD-10-CM

## 2017-02-06 DIAGNOSIS — R68.83 CHILLS: ICD-10-CM

## 2017-02-06 LAB
CTP QC/QA: YES
FLUAV AG NPH QL: NEGATIVE
FLUBV AG NPH QL: NEGATIVE

## 2017-02-06 PROCEDURE — 99214 OFFICE O/P EST MOD 30 MIN: CPT | Mod: PBBFAC,PO | Performed by: NURSE PRACTITIONER

## 2017-02-06 PROCEDURE — 99999 PR PBB SHADOW E&M-EST. PATIENT-LVL IV: CPT | Mod: PBBFAC,,, | Performed by: NURSE PRACTITIONER

## 2017-02-06 PROCEDURE — 87804 INFLUENZA ASSAY W/OPTIC: CPT | Mod: PBBFAC,PO | Performed by: NURSE PRACTITIONER

## 2017-02-06 PROCEDURE — 99213 OFFICE O/P EST LOW 20 MIN: CPT | Mod: S$PBB,,, | Performed by: NURSE PRACTITIONER

## 2017-02-06 RX ORDER — HYDROCHLOROTHIAZIDE 25 MG/1
TABLET ORAL
Qty: 90 TABLET | Refills: 0 | OUTPATIENT
Start: 2017-02-06

## 2017-02-06 NOTE — PATIENT INSTRUCTIONS
PLAN: Lab work POCT influenza screen  Advise increase p.o. fluids--of water/juice & rest  Normal saline nasal wash & Flonase to irrigate sinuses and for congestion/runny nose.  Cool mist humidifier/vaporizer.  Advise use OTC Mucinex  Practice good handwashing.  Advised d/c Afrin  Warm salt water gargles for throat comfort.  Chloraseptic spray or lozenges for throat comfort.  See PCP or go to ER if symptoms worsen or fail to improve with treatment.

## 2017-02-06 NOTE — PROGRESS NOTES
CHIEF COMPLAINT/REASON FOR VISIT:  nasal congestion, chills with body aches    HISTORY OF PRESENT ILLNESS:  88 year-old male with wife complains of nasal congestion, chills with body aches onset this morning. Wife & patient concerned may have the Flu. Wife requesting Flu screen. Patient admits tried over-the-counter Afrin for congestion with no relief. Discussed with patient dangers & side affects with afrin. Discussed further evaluation at [facility] Patient refuses emergency treatment.     Past Medical History   Diagnosis Date    Atrial fibrillation, chronic     CAD in native artery 12/18/2015    Cataract     Chronic airway obstruction, not elsewhere classified     Coronary atherosclerosis of unspecified type of vessel, native or graft     Esophageal reflux     Essential hypertension     Heart disease     History of bladder cancer     History of colon cancer     Hyperlipidemia LDL goal <70     Hypertrophy of prostate without urinary obstruction and other lower urinary tract symptoms (LUTS)     Impaired fasting glucose     Macular degeneration     Pulmonary hypertension 12/18/2015    TR (tricuspid regurgitation) 12/18/2015       Past Surgical History   Procedure Laterality Date    Partial coloectomy for large polyp      Coronary artery bypass graft      Colon surgery      Back surgery      Turbt       Social History     Social History    Marital status:      Spouse name: N/A    Number of children: N/A    Years of education: N/A     Occupational History    Not on file.     Social History Main Topics    Smoking status: Former Smoker     Packs/day: 2.00     Years: 70.00     Types: Cigarettes    Smokeless tobacco: Never Used    Alcohol use No    Drug use: No    Sexual activity: No     Other Topics Concern    Not on file     Social History Narrative       ROS:  GENERAL: chills with body aches  SKIN: No rashes, itching or changes in color or texture of skin.   HEENT:  Reports sore  throat,   runny nose, nasal congestion  NODES: No masses or lesions. Denies swollen glands.   CHEST: reports cough and sputum production.   CARDIOVASCULAR: Denies chest pain, shortness of breath.  ABDOMEN: Appetite fine. No weight loss. Denies diarrhea, abdominal pain  MUSCULOSKELETAL: No joint stiffness or swelling. Denies back pain.  NEUROLOGIC: No history of seizures, paralysis, alteration of gait or coordination.  PSYCHIATRIC: Denies mood swings, depression or suicidal thoughts.    PE:   APPEARANCE: Well nourished, well developed, in mild distress.  O2 in progress per N/C  SKIN: Normal skin turgor, no lesions.  HEENT: Turbinates injected, pink pharynx. TMs poor light reflex bilateral, no facial tenderness.  CHEST: Lungs clear to auscultation. No wheezing  CARDIOVASCULAR: Regular rate and rhythm.  NEUROLOGIC: No sensory deficits. Gait & Posture: Normal, No cerebellar signs.  MENTAL STATUS: Patient alert, oriented x 3 & conversant.    PLAN: Lab work POCT influenza screen  Advise increase p.o. fluids--of water/juice & rest  Normal saline nasal wash & Flonase to irrigate sinuses and for congestion/runny nose.  Cool mist humidifier/vaporizer.  Advise use OTC Mucinex  Practice good handwashing.  Advised d/c Afrin  Warm salt water gargles for throat comfort.  Chloraseptic spray or lozenges for throat comfort.  See PCP or go to ER if symptoms worsen or fail to improve with treatment.      DIAGNOSIS:  URI  COPD  Body aches  Dizziness  Hypertension

## 2017-02-06 NOTE — MR AVS SNAPSHOT
[facility]  [address]  [facility] [address]  Phone: [phone]  Fax: [phone]                  Kelton Mcdaniels   2017 2:10 PM   Office Visit    Description:  Male : 12/3/1928   Provider:  Sade Bryson NP   Department:  [facility]           Reason for Visit     Dizziness     Chills     Generalized Body Aches           Diagnoses this Visit        Comments    Body aches    -  Primary     Chills                To Do List           Future Appointments        Provider Department Dept Phone    2017 1:00 PM MD [facility] Serrato [facility] - Urology [phone]    2017 9:00 AM PULMONARY DISEASE MANAGEMENT ON[facility] - Pulm Function Sv [phone]    2017 9:40 AM LABORATORY, [facility] Laboratory [phone]    2017 8:40 AM Sunny Plummer MD [facility] Internal Medicine [phone]    3/1/2017 1:15 PM CRESCENCIO Phillips MD [facility] Ophthalmology [phone]      Goals (5 Years of Data)     None      [facility]     [facility] On Call Nurse Care Line -  Assistance  Registered nurses in the [facility] provide clinical advisement, health education, appointment booking, and other advisory services.  Call for this free service at [phone].             Medications           Message regarding Medications     Verify the changes and/or additions to your medication regime listed below are the same as discussed with your clinician today.  If any of these changes or additions are incorrect, please notify your healthcare provider.        STOP taking these medications     predniSONE (DELTASONE) 20 MG tablet 3 tab for 3 days. 2 tab for 3 days. 1 tab for 3 days. 1/2 tab for 3 days           Verify that the below list of medications is an accurate representation of the medications you are currently taking.  If none reported, the list may be blank. If incorrect, please contact your healthcare provider. Carry this  "list with you in case of emergency.           Current Medications     albuterol (PROAIR HFA) 90 mcg/actuation inhaler Inhale 2 puffs into the lungs every 4 (four) hours as needed.    albuterol-ipratropium 2.5mg-0.5mg/3mL (DUO-NEB) 0.5 mg-3 mg(2.5 mg base)/3 mL nebulizer solution Take 3 mLs by nebulization every 6 (six) hours while awake. DX: J44.9    carvedilol (COREG) 25 MG tablet TAKE ONE TABLET BY MOUTH TWICE DAILY    doxycycline (MONODOX) 100 MG capsule Take 1 capsule (100 mg total) by mouth 2 (two) times daily.    fluticasone-salmeterol 250-50 mcg/dose (ADVAIR DISKUS) 250-50 mcg/dose diskus inhaler INHALE ONE DOSE BY MOUTH TWICE DAILY    hydrochlorothiazide (HYDRODIURIL) 25 MG tablet Take 1 tablet (25 mg total) by mouth once daily.    mucus clearing device (ACAPELLA) Sariah 1 Device by Misc.(Non-Drug; Combo Route) route every 4 to 6 hours as needed.    omeprazole (PRILOSEC) 20 MG capsule Take 1 capsule (20 mg total) by mouth once daily.    roflumilast (DALIRESP) 500 mcg Tab Take 1 tablet (500 mcg total) by mouth once daily.    simvastatin (ZOCOR) 40 MG tablet Take 1 tablet (40 mg total) by mouth every evening.    tiotropium bromide 2.5 mcg/actuation Mist Inhale 2.5 mcg into the lungs once daily.    alprazolam (XANAX) 0.25 MG tablet Take 1 tablet (0.25 mg total) by mouth daily as needed for Anxiety. For panic attacks    butenafine 1 % cream Apply topically 2 (two) times daily.    gabapentin (NEURONTIN) 100 MG capsule Take 1 capsule (100 mg total) by mouth every evening.    nitroGLYCERIN (NITROSTAT) 0.4 MG SL tablet Place 1 tablet (0.4 mg total) under the tongue every 5 (five) minutes as needed for Chest pain.           Clinical Reference Information           Your Vitals Were     BP Pulse Temp Resp    130/70 (BP Location: Right arm, Patient Position: Sitting, BP Method: Manual) 87 97.7 °F (36.5 °C) (Tympanic) 20    Height Weight SpO2 BMI    5' 7" (1.702 m) 79.6 kg (175 lb 7.8 oz) 95% 27.49 kg/m2      Blood " Pressure          Most Recent Value    BP  130/70      Allergies as of 2/6/2017     No Known Allergies      Immunizations Administered on Date of Encounter - 2/6/2017     None      Orders Placed During Today's Visit      Normal Orders This Visit    POCT Influenza A/B       [facility] Sign-Up     Activating your [facility] account is as easy as 1-2-3!     1) Visit [email or web address], select Sign Up Now, enter this activation code and your date of birth, then select Next.  [email or web address]  Expires: 2/18/2017 10:18 AM      2) Create a username and password to use when you visit [facility] in the future and select a security question in case you lose your password and select Next.    3) Enter your e-mail address and click Sign Up!    Additional Information  If you have questions, please e-mail [email or web address] or call [phone] to talk to our [facility] staff. Remember, [facility] is NOT to be used for urgent needs. For medical emergencies, dial 911.         Instructions    PLAN: Lab work POCT influenza screen  Advise increase p.o. fluids--of water/juice & rest  Normal saline nasal wash & Flonase to irrigate sinuses and for congestion/runny nose.  Cool mist humidifier/vaporizer.  Practice good handwashing.  Warm salt water gargles for throat comfort.  Chloraseptic spray or lozenges for throat comfort.  See PCP or go to ER if symptoms worsen or fail to improve with treatment.         Language Assistance Services     ATTENTION: Language assistance services are available, free of charge. Please call [phone].      ATENCIÓN: Si habla español, tiene a segal disposición servicios gratuitos de asistencia lingüística. Llame al [phone].     CHÚ Ý: N?u b?n nói Ti?ng Vi?t, có các d?ch v? h? tr? ngôn ng? mi?n phí dành cho b?n. G?i s? [phone].         [facility] complies with applicable Federal civil rights laws and does not discriminate on the basis of race, color, national origin, age,  disability, or sex.

## 2017-02-09 ENCOUNTER — OFFICE VISIT (OUTPATIENT)
Dept: UROLOGY | Facility: CLINIC | Age: 82
End: 2017-02-09
Payer: MEDICARE

## 2017-02-09 VITALS — BODY MASS INDEX: 27.41 KG/M2 | WEIGHT: 175 LBS

## 2017-02-09 DIAGNOSIS — N40.0 BENIGN PROSTATIC HYPERPLASIA, PRESENCE OF LOWER URINARY TRACT SYMPTOMS UNSPECIFIED, UNSPECIFIED MORPHOLOGY: Primary | ICD-10-CM

## 2017-02-09 LAB
BILIRUB SERPL-MCNC: NORMAL MG/DL
BLOOD URINE, POC: NORMAL
COLOR, POC UA: YELLOW
GLUCOSE UR QL STRIP: NORMAL
KETONES UR QL STRIP: NORMAL
LEUKOCYTE ESTERASE URINE, POC: NORMAL
NITRITE, POC UA: NORMAL
PH, POC UA: 7
PROTEIN, POC: NORMAL
SPECIFIC GRAVITY, POC UA: 1
UROBILINOGEN, POC UA: NORMAL

## 2017-02-09 PROCEDURE — 99499 UNLISTED E&M SERVICE: CPT | Mod: S$PBB,,, | Performed by: UROLOGY

## 2017-02-09 PROCEDURE — 52000 CYSTOURETHROSCOPY: CPT | Mod: S$PBB,,, | Performed by: UROLOGY

## 2017-02-09 PROCEDURE — 52000 CYSTOURETHROSCOPY: CPT | Mod: PBBFAC | Performed by: UROLOGY

## 2017-02-09 PROCEDURE — 99999 PR PBB SHADOW E&M-EST. PATIENT-LVL I: CPT | Mod: PBBFAC,,, | Performed by: UROLOGY

## 2017-02-09 PROCEDURE — 99211 OFF/OP EST MAY X REQ PHY/QHP: CPT | Mod: PBBFAC | Performed by: UROLOGY

## 2017-02-09 PROCEDURE — 81002 URINALYSIS NONAUTO W/O SCOPE: CPT | Mod: PBBFAC | Performed by: UROLOGY

## 2017-02-09 RX ORDER — FINASTERIDE 5 MG/1
5 TABLET, FILM COATED ORAL DAILY
Qty: 30 TABLET | Refills: 11 | Status: SHIPPED | OUTPATIENT
Start: 2017-02-09 | End: 2018-02-05 | Stop reason: SDUPTHER

## 2017-02-09 NOTE — PROGRESS NOTES
Chief Complaint: Gross Hematuria    HPI:   2/9/17: Other than a simple left renal cyst no findings on CT Urogram. Cysto normal except BPH no recurrence of bladder cancer.  1/31/17: 89 yo man last week had a day of dark blood with clots last week.  Was dx with bladder cancer 15+ years ago.  Some surveillance cystoscopy but none in the last 10 years. No abd/pelvic pain and no exac/rel factors.  No urolithiasis.  No urinary bother.  Had 6 wks BCG with a restaging resection after; maybe more BCG after that.  Was told to worry about it no more.  Also had a TURP in that same time frame.  And some gross hematuria attributed to his prostate in 2005.    Allergies:  No known allergies    Medications:  has a current medication list which includes the following prescription(s): albuterol, albuterol-ipratropium 2.5mg-0.5mg/3ml, carvedilol, doxycycline, fluticasone-salmeterol 250-50 mcg/dose, hydrochlorothiazide, mucus clearing device, omeprazole, roflumilast, simvastatin, tiotropium bromide, alprazolam, butenafine, gabapentin, and nitroglycerin.    Review of Systems:  General: No fever, chills, fatigability, or weight loss.  Skin: No rashes, itching, or changes in color or texture of skin.  Chest: Denies CARBAJAL, cyanosis, wheezing, cough, and sputum production.  Abdomen: Appetite fine. No weight loss. Denies diarrhea, abdominal pain, hematemesis, or blood in stool.  Musculoskeletal: No joint stiffness or swelling. Denies back pain.  : As above.  All other review of systems negative.    PMH:   has a past medical history of Atrial fibrillation, chronic; CAD in native artery (12/18/2015); Cataract; Chronic airway obstruction, not elsewhere classified; Coronary atherosclerosis of unspecified type of vessel, native or graft; Esophageal reflux; Essential hypertension; Heart disease; History of bladder cancer; History of colon cancer; Hyperlipidemia LDL goal <70; Hypertrophy of prostate without urinary obstruction and other lower  urinary tract symptoms (LUTS); Impaired fasting glucose; Macular degeneration; Pulmonary hypertension (12/18/2015); and TR (tricuspid regurgitation) (12/18/2015).    PSH:   has a past surgical history that includes partial coloectomy for large polyp; Coronary artery bypass graft; Colon surgery; Back surgery; and turbt.    FamHx: family history includes Asthma in his paternal uncle; Cancer in his brother; Diabetes in his father; Heart failure in his mother.    SocHx:  reports that he has quit smoking. His smoking use included Cigarettes. He has a 140.00 pack-year smoking history. He has never used smokeless tobacco. He reports that he does not drink alcohol or use illicit drugs.      Physical Exam:  There were no vitals filed for this visit.  General: A&Ox3, no apparent distress, no deformities  Neck: No masses, normal thyroid  Lungs: normal inspiration, no use of accessory muscles  Heart: normal pulse, no arrhythmias  Abdomen: Soft, NT, ND, no masses, no hernias, no hepatosplenomegaly  Lymphatic: Neck and groin nodes negative  Skin: The skin is warm and dry. No jaundice.  Ext: No c/c/e.  : test desc danyel, CARLEY normal      Procedure: Diagnostic Cystoscopy    Procedure in Detail: After proper consents were obtained, the patient was prepped and draped in normal sterile fashion for diagnostic cystoscopy. 5 ml of lidocaine jelly was instilled in the urethra. The flexible cystoscope was then introduced into the urethra, and advanced into the bladder under direct vision. The urethral mucosa appeared normal, and no strictures were noted. The sphincter appeared to be normal, and the veru montanum was unremarkable. The prostatic mucosa and the lateral lobes of the prostate were opened with obvious prior TURP, with a couple of nodules extending to midline from right side. The bladder neck was normal. Inspection of the interior of the bladder was then carried out. The trigone was unremarkable, with no mucosal lesions. The  ureteral orifices were normal in position and configuration. Systematic inspection of the mucosa of the bladder it was then carried out, rotating the cystoscope so that all areas of the left and right lateral walls, the dome of the bladder, and the posterior wall were all visualized. The cystoscope was then advanced further into the bladder, and maximum deflection of the scope was performed so that the bladder neck could be inspected. No mucosal lesions were noted there. The cystoscope was then removed, and the procedure terminated.     Findings: BPH s/p TURP, mildly obstructive    Impression/Plan:   1. Hematuria likely due to mildly friable prostate.  Adding finasteride, RTC 1 year.

## 2017-02-09 NOTE — MR AVS SNAPSHOT
[facility]  [address] [facility] [address]  Phone: [phone]  Fax: [phone]                  Kelton Mcdaniels   2017 1:00 PM   Office Visit    Description:  Male : 12/3/1928   Provider:  Freddy Ansari IV, MD   Department:  [facility]           Diagnoses this Visit        Comments    Benign prostatic hyperplasia, presence of lower urinary tract symptoms unspecified, unspecified morphology    -  Primary            To Do List           Future Appointments        Provider Department Dept Phone    2017 9:00 AM PULMONARY DISEASE MANAGEMENT [facility] - Pulm Function [facility] [phone]    2017 9:40 AM LABORATORY, [facility] - Laboratory [phone]    2017 8:40 AM Sunny Plummer MD [facility] Internal Medicine [phone]    3/1/2017 1:15 PM CRESCENCIO Phillips MD [facility] - Ophthalmology [phone]    2017 9:00 AM ONLH XR1- [facility] [phone]      Goals (5 Years of Data)     None      Follow-Up and Disposition     Return in about 1 year (around 2018).    Follow-up and Disposition History       These Medications        Disp Refills Start End    finasteride (PROSCAR) 5 mg tablet 30 tablet 11 2017    Take 1 tablet (5 mg total) by mouth once daily. - Oral    Pharmacy: [facility] [address] [place] [id number] [address] #: [phone]         [facility]     [facility] Nurse Care Line -  Assistance  Registered nurses in the [facility] provide clinical advisement, health education, appointment booking, and other advisory services.  Call for this free service at [phone].             Medications           Message regarding Medications     Verify the changes and/or additions to your medication regime listed below are the same as discussed with your clinician today.  If any of these changes or additions are incorrect, please notify your healthcare  provider.        START taking these NEW medications        Refills    finasteride (PROSCAR) 5 mg tablet 11    Sig: Take 1 tablet (5 mg total) by mouth once daily.    Class: Normal    Route: Oral           Verify that the below list of medications is an accurate representation of the medications you are currently taking.  If none reported, the list may be blank. If incorrect, please contact your healthcare provider. Carry this list with you in case of emergency.           Current Medications     albuterol (PROAIR HFA) 90 mcg/actuation inhaler Inhale 2 puffs into the lungs every 4 (four) hours as needed.    albuterol-ipratropium 2.5mg-0.5mg/3mL (DUO-NEB) 0.5 mg-3 mg(2.5 mg base)/3 mL nebulizer solution Take 3 mLs by nebulization every 6 (six) hours while awake. DX: J44.9    carvedilol (COREG) 25 MG tablet TAKE ONE TABLET BY MOUTH TWICE DAILY    doxycycline (MONODOX) 100 MG capsule Take 1 capsule (100 mg total) by mouth 2 (two) times daily.    fluticasone-salmeterol 250-50 mcg/dose (ADVAIR DISKUS) 250-50 mcg/dose diskus inhaler INHALE ONE DOSE BY MOUTH TWICE DAILY    hydrochlorothiazide (HYDRODIURIL) 25 MG tablet Take 1 tablet (25 mg total) by mouth once daily.    mucus clearing device (ACAPELLA) Sariah 1 Device by Misc.(Non-Drug; Combo Route) route every 4 to 6 hours as needed.    omeprazole (PRILOSEC) 20 MG capsule Take 1 capsule (20 mg total) by mouth once daily.    roflumilast (DALIRESP) 500 mcg Tab Take 1 tablet (500 mcg total) by mouth once daily.    simvastatin (ZOCOR) 40 MG tablet Take 1 tablet (40 mg total) by mouth every evening.    tiotropium bromide 2.5 mcg/actuation Mist Inhale 2.5 mcg into the lungs once daily.    alprazolam (XANAX) 0.25 MG tablet Take 1 tablet (0.25 mg total) by mouth daily as needed for Anxiety. For panic attacks    butenafine 1 % cream Apply topically 2 (two) times daily.    finasteride (PROSCAR) 5 mg tablet Take 1 tablet (5 mg total) by mouth once daily.    gabapentin (NEURONTIN) 100 MG  capsule Take 1 capsule (100 mg total) by mouth every evening.    nitroGLYCERIN (NITROSTAT) 0.4 MG SL tablet Place 1 tablet (0.4 mg total) under the tongue every 5 (five) minutes as needed for Chest pain.           Clinical Reference Information           Your Vitals Were     Weight BMI             79.4 kg (175 lb) 27.41 kg/m2         Allergies as of 2/9/2017     No Known Allergies      Immunizations Administered on Date of Encounter - 2/9/2017     None      Orders Placed During Today's Visit      Normal Orders This Visit    POCT urine dipstick without microscope       [facility] Sign-Up     Activating your [facility] account is as easy as 1-2-3!     1) Visit [email or web address], select Sign Up Now, enter this activation code and your date of birth, then select Next.  [email or web address]  Expires: 2/18/2017 10:18 AM      2) Create a username and password to use when you visit [facility] in the future and select a security question in case you lose your password and select Next.    3) Enter your e-mail address and click Sign Up!    Additional Information  If you have questions, please e-mail [email or web address] or call [phone] to talk to our [facility] staff. Remember, [facility] is NOT to be used for urgent needs. For medical emergencies, dial 911.         Language Assistance Services     ATTENTION: Language assistance services are available, free of charge. Please call [phone].      ATENCIÓN: Si habla español, tiene a segal disposición servicios gratuitos de asistencia lingüística. Llame al [phone].     CHÚ Ý: N?u b?n nói Ti?ng Vi?t, có các d?ch v? h? tr? ngôn ng? mi?n phí dành cho b?n. G?i s? [phone].         [facility] complies with applicable Federal civil rights laws and does not discriminate on the basis of race, color, national origin, age, disability, or sex.

## 2017-02-14 ENCOUNTER — TELEPHONE (OUTPATIENT)
Dept: PULMONOLOGY | Facility: CLINIC | Age: 82
End: 2017-02-14

## 2017-02-14 ENCOUNTER — PROCEDURE VISIT (OUTPATIENT)
Dept: PULMONOLOGY | Facility: CLINIC | Age: 82
End: 2017-02-14
Payer: MEDICARE

## 2017-02-14 VITALS — HEIGHT: 66 IN | OXYGEN SATURATION: 98 % | HEART RATE: 80 BPM | BODY MASS INDEX: 28.14 KG/M2 | WEIGHT: 175.06 LBS

## 2017-02-14 DIAGNOSIS — J44.9 COPD (CHRONIC OBSTRUCTIVE PULMONARY DISEASE): Primary | ICD-10-CM

## 2017-02-14 DIAGNOSIS — J44.9 COPD, SEVERE: Chronic | ICD-10-CM

## 2017-02-14 DIAGNOSIS — R06.02 SOB (SHORTNESS OF BREATH): ICD-10-CM

## 2017-02-14 DIAGNOSIS — J44.9 COPD, SEVERE: Primary | Chronic | ICD-10-CM

## 2017-02-14 RX ORDER — PREDNISONE 10 MG/1
10 TABLET ORAL DAILY
Qty: 30 TABLET | Refills: 0 | Status: SHIPPED | OUTPATIENT
Start: 2017-02-14 | End: 2017-03-16

## 2017-02-14 RX ORDER — ALBUTEROL SULFATE 90 UG/1
2 AEROSOL, METERED RESPIRATORY (INHALATION) EVERY 4 HOURS PRN
Qty: 1 INHALER | Refills: 11 | Status: SHIPPED | OUTPATIENT
Start: 2017-02-14 | End: 2018-02-15 | Stop reason: SDUPTHER

## 2017-02-14 RX ORDER — ALBUTEROL SULFATE 90 UG/1
2 AEROSOL, METERED RESPIRATORY (INHALATION) EVERY 4 HOURS PRN
Qty: 1 INHALER | Refills: 11 | Status: SHIPPED | OUTPATIENT
Start: 2017-02-14 | End: 2017-02-14 | Stop reason: SDUPTHER

## 2017-02-14 NOTE — MR AVS SNAPSHOT
[facility]  [address]  [place] [address]  Phone: [phone]  Fax: [phone]                  Kelton Mcdaniels   2017 9:00 AM   Procedure visit    Description:  Male : 12/3/1928   Provider:  [facility]   Department:  [facility]           Diagnoses this Visit        Comments    COPD, severe    -  Primary            To Do List           Future Appointments        Provider Department Dept Phone    2017 9:40 AM LABORATORY, [facility] [phone]    2017 8:40 AM Sunny Plummer MD [place] - Internal Medicine [phone]    3/1/2017 1:15 PM CRESCENCIO Phillips MD [facility] - Ophthalmology [phone]    2017 9:00 AM [facility] XR1- [facility] [phone]    2017 9:20 AM [facility] [phone]      Goals (5 Years of Data)     None      [facility]     [facility] Care [facility] -  Assistance  Registered nurses in the [facility] provide clinical advisement, health education, appointment booking, and other advisory services.  Call for this free service at [phone].             Medications           Message regarding Medications     Verify the changes and/or additions to your medication regime listed below are the same as discussed with your clinician today.  If any of these changes or additions are incorrect, please notify your healthcare provider.             Verify that the below list of medications is an accurate representation of the medications you are currently taking.  If none reported, the list may be blank. If incorrect, please contact your healthcare provider. Carry this list with you in case of emergency.           Current Medications     albuterol (PROAIR HFA) 90 mcg/actuation inhaler Inhale 2 puffs into the lungs every 4 (four) hours as needed.    albuterol-ipratropium 2.5mg-0.5mg/3mL (DUO-NEB) 0.5  "mg-3 mg(2.5 mg base)/3 mL nebulizer solution Take 3 mLs by nebulization every 6 (six) hours while awake. DX: J44.9    alprazolam (XANAX) 0.25 MG tablet Take 1 tablet (0.25 mg total) by mouth daily as needed for Anxiety. For panic attacks    butenafine 1 % cream Apply topically 2 (two) times daily.    carvedilol (COREG) 25 MG tablet TAKE ONE TABLET BY MOUTH TWICE DAILY    doxycycline (MONODOX) 100 MG capsule Take 1 capsule (100 mg total) by mouth 2 (two) times daily.    finasteride (PROSCAR) 5 mg tablet Take 1 tablet (5 mg total) by mouth once daily.    fluticasone-salmeterol 250-50 mcg/dose (ADVAIR DISKUS) 250-50 mcg/dose diskus inhaler INHALE ONE DOSE BY MOUTH TWICE DAILY    gabapentin (NEURONTIN) 100 MG capsule Take 1 capsule (100 mg total) by mouth every evening.    hydrochlorothiazide (HYDRODIURIL) 25 MG tablet Take 1 tablet (25 mg total) by mouth once daily.    mucus clearing device (ACAPELLA) Sariah 1 Device by Misc.(Non-Drug; Combo Route) route every 4 to 6 hours as needed.    nitroGLYCERIN (NITROSTAT) 0.4 MG SL tablet Place 1 tablet (0.4 mg total) under the tongue every 5 (five) minutes as needed for Chest pain.    omeprazole (PRILOSEC) 20 MG capsule Take 1 capsule (20 mg total) by mouth once daily.    predniSONE (DELTASONE) 10 MG tablet Take 1 tablet (10 mg total) by mouth once daily.    roflumilast (DALIRESP) 500 mcg Tab Take 1 tablet (500 mcg total) by mouth once daily.    simvastatin (ZOCOR) 40 MG tablet Take 1 tablet (40 mg total) by mouth every evening.    tiotropium bromide 2.5 mcg/actuation Mist Inhale 5 mcg into the lungs once daily.           Clinical Reference Information           Your Vitals Were     Pulse Height Weight SpO2 BMI    80 5' 6" (1.676 m) 79.4 kg (175 lb 0.7 oz) 98% 28.25 kg/m2      Allergies as of 2/14/2017     No Known Allergies      Immunizations Administered on Date of Encounter - 2/14/2017     None      [facility] Sign-Up     Activating your [facility] account is as easy as 1-2-3! "     1) Visit [email or web address], select Sign Up Now, enter this activation code and your date of birth, then select Next.  [email or web address]  Expires: 2/18/2017 10:18 AM      2) Create a username and password to use when you visit [facility] in the future and select a security question in case you lose your password and select Next.    3) Enter your e-mail address and click Sign Up!    Additional Information  If you have questions, please e-mail [email or web address] or call [phone] to talk to our [email or web address] staff. Remember, [email or web address] is NOT to be used for urgent needs. For medical emergencies, dial 911.         Language Assistance Services     ATTENTION: Language assistance services are available, free of charge. Please call [phone].      ATENCIÓN: Si habla español, tiene a segal disposición servicios gratuitos de asistencia lingüística. Llame al [phone].     CHÚ Ý: N?u b?n nói Ti?ng Vi?t, có các d?ch v? h? tr? ngôn ng? mi?n phí dành cho b?n. G?i s? [phone].         [facility] complies with applicable Federal civil rights laws and does not discriminate on the basis of race, color, national origin, age, disability, or sex.

## 2017-02-14 NOTE — TELEPHONE ENCOUNTER
Patient was offered an appointment at [facility] today. Patient declined request for medication be called in to help with the congestion. Patient has a non productive congested cough.

## 2017-02-14 NOTE — PROGRESS NOTES
Pulmonary Disease Management  [facility]  Initial Visit    Referring Provider: Dr. Cat  Diagnosis: COPD  Current Respiratory Medications:      Current Outpatient Prescriptions:     albuterol (PROAIR HFA) 90 mcg/actuation inhaler, Inhale 2 puffs into the lungs every 4 (four) hours as needed., Disp: 1 Inhaler, Rfl: 11    albuterol-ipratropium 2.5mg-0.5mg/3mL (DUO-NEB) 0.5 mg-3 mg(2.5 mg base)/3 mL nebulizer solution, Take 3 mLs by nebulization every 6 (six) hours while awake. DX: J44.9, Disp: 270 mL, Rfl: 11    alprazolam (XANAX) 0.25 MG tablet, Take 1 tablet (0.25 mg total) by mouth daily as needed for Anxiety. For panic attacks, Disp: 30 tablet, Rfl: 0    butenafine 1 % cream, Apply topically 2 (two) times daily., Disp: 12 g, Rfl: 0    carvedilol (COREG) 25 MG tablet, TAKE ONE TABLET BY MOUTH TWICE DAILY, Disp: 60 tablet, Rfl: 11    doxycycline (MONODOX) 100 MG capsule, Take 1 capsule (100 mg total) by mouth 2 (two) times daily., Disp: 20 capsule, Rfl: 0    finasteride (PROSCAR) 5 mg tablet, Take 1 tablet (5 mg total) by mouth once daily., Disp: 30 tablet, Rfl: 11    fluticasone-salmeterol 250-50 mcg/dose (ADVAIR DISKUS) 250-50 mcg/dose diskus inhaler, INHALE ONE DOSE BY MOUTH TWICE DAILY, Disp: 60 each, Rfl: 11    gabapentin (NEURONTIN) 100 MG capsule, Take 1 capsule (100 mg total) by mouth every evening., Disp: 30 capsule, Rfl: 0    hydrochlorothiazide (HYDRODIURIL) 25 MG tablet, Take 1 tablet (25 mg total) by mouth once daily., Disp: 90 tablet, Rfl: 3    mucus clearing device (ACAPELLA) Sariah, 1 Device by Misc.(Non-Drug; Combo Route) route every 4 to 6 hours as needed., Disp: 1 Device, Rfl: 0    nitroGLYCERIN (NITROSTAT) 0.4 MG SL tablet, Place 1 tablet (0.4 mg total) under the tongue every 5 (five) minutes as needed for Chest pain., Disp: 20 tablet, Rfl: 12    omeprazole (PRILOSEC) 20 MG capsule, Take 1 capsule (20 mg total) by mouth once daily., Disp: 90 capsule, Rfl: 3     roflumilast (DALIRESP) 500 mcg Tab, Take 1 tablet (500 mcg total) by mouth once daily., Disp: 90 tablet, Rfl: 3    simvastatin (ZOCOR) 40 MG tablet, Take 1 tablet (40 mg total) by mouth every evening., Disp: 90 tablet, Rfl: 3    tiotropium bromide 2.5 mcg/actuation Mist, Inhale 2.5 mcg into the lungs once daily., Disp: 4 g, Rfl: 11    Allergies:   Review of patient's allergies indicates:   Allergen Reactions    No known allergies        Smoking history:   History   Smoking Status    Former Smoker    Packs/day: 2.00    Years: 70.00    Types: Cigarettes   Smokeless Tobacco    Never Used       SpO2: 98 %  Pulse: 80  Resp. Rate: 22 PER MINUTE  BMI (kg/m2): 28.31  All Lung Fields Breath Sounds: diminished  Cough Type: congested, weak  Cough Frequency: frequent  Sputum Color: white  Sputum Amount: moderate  Sputum Consistency: thick    Resting Jack Dyspnea Rating : 5-6  Skyler Score: 5  Current Oxygen Use: Yes  Device: Portable tank, Concentrator  Liter Flow: 2  Oxygen Usage Duration: Constantly               COPD Questionnaire:               PFT Results:  Pre FVC   Date/Time Value Ref Range Status   01/04/2017 09:01 AM 2.24 (L) 2.58 - 3.41 L Final     Pre FEV1   Date/Time Value Ref Range Status   01/04/2017 09:01 AM 0.65 (L) 1.66 - 2.36 L Final     Pre FEV1 FVC   Date/Time Value Ref Range Status   01/04/2017 09:01 AM 29 % Final                Method Used for Education: Literature, Demonstration, Verbal  Did the patient demonstrate understanding?: Yes     Patient Concerns or Expectations: Patient states he has a congested cough. Patient not able to mobilize secretions.    Therapist Comments: Patient denies having any wheezing, chest tightness, or chest pain. Patient was offered to see the NP at [facility] today but declined. A message was sent to Dr. Cat about all patient concerns. Patient was encouraged to use Acapella after each nebulizer treatment at home. Patient also encouraged to ask oxygen company for  humidification for concentrator. Patient states he gets dry during the night. Patient was not sure of the name of the company that delivers his oxygen. Patient encouraged to use nebulizer 3 times a day. Patient also states he is out of his Spiriva until Saturday. Patient currently using Advair 250/50, Spiriva Q day, and Duo Neb BID. Patient dose not have a rescue inhaler will request one from Dr. Cat. Patient was given educational literature on COPD action plan, tips on chronic lung disease: maximizing your energy, using spacers with inhalers, and preventing lung infections. Patient was given a spacer and instructed on how to use spacer with Inhaler.  Patient showed understanding of all information given. Will follow up with patient via phone in 2 weeks.

## 2017-02-15 DIAGNOSIS — I10 ESSENTIAL HYPERTENSION: ICD-10-CM

## 2017-02-15 RX ORDER — HYDROCHLOROTHIAZIDE 25 MG/1
25 TABLET ORAL DAILY
Qty: 90 TABLET | Refills: 3 | Status: SHIPPED | OUTPATIENT
Start: 2017-02-15 | End: 2018-02-05 | Stop reason: SDUPTHER

## 2017-02-15 NOTE — TELEPHONE ENCOUNTER
----- Message from Susie Zamora sent at 2/15/2017 10:56 AM CST -----  Contact: Patient  Patient called and stated he doesn't understand why Dr. Plummer won't approve his BP medication. He would like to speak with someone about this; please call him at [phone].    Thanks,  Susie

## 2017-02-15 NOTE — TELEPHONE ENCOUNTER
Returned call. Spoke with pt. Patient stated that he is out of BP meds and has been for a little while now. The pharm told him his med was denied and patient wants to know if he can get some meds to help get him till his appt on the 27th. Informed would forward info to doctor.

## 2017-02-16 ENCOUNTER — TELEPHONE (OUTPATIENT)
Dept: PULMONOLOGY | Facility: CLINIC | Age: 82
End: 2017-02-16

## 2017-02-16 NOTE — TELEPHONE ENCOUNTER
Patient called for follow up from PDM appointment. Patient states he feels better. He was able to get rescue inhaler called in by Dr. Cat. Patient also received humidification form DME company for concentrator. Also addressed patient questions about liquid oxygen. Will follow up with patient via phone in a few weeks.

## 2017-02-20 ENCOUNTER — LAB VISIT (OUTPATIENT)
Dept: LAB | Facility: HOSPITAL | Age: 82
End: 2017-02-20
Attending: INTERNAL MEDICINE
Payer: MEDICARE

## 2017-02-20 DIAGNOSIS — I25.10 CAD IN NATIVE ARTERY: ICD-10-CM

## 2017-02-20 DIAGNOSIS — I10 ESSENTIAL HYPERTENSION: ICD-10-CM

## 2017-02-20 DIAGNOSIS — I27.20 PULMONARY HYPERTENSION: ICD-10-CM

## 2017-02-20 LAB
ALBUMIN SERPL BCP-MCNC: 4 G/DL
ALP SERPL-CCNC: 62 U/L
ALT SERPL W/O P-5'-P-CCNC: 13 U/L
ANION GAP SERPL CALC-SCNC: 13 MMOL/L
AST SERPL-CCNC: 16 U/L
BASOPHILS # BLD AUTO: 0.01 K/UL
BASOPHILS NFR BLD: 0.1 %
BILIRUB SERPL-MCNC: 0.9 MG/DL
BNP SERPL-MCNC: 42 PG/ML
BUN SERPL-MCNC: 9 MG/DL
CALCIUM SERPL-MCNC: 9.8 MG/DL
CHLORIDE SERPL-SCNC: 95 MMOL/L
CHOLEST/HDLC SERPL: 2.9 {RATIO}
CO2 SERPL-SCNC: 32 MMOL/L
CREAT SERPL-MCNC: 0.8 MG/DL
DIFFERENTIAL METHOD: ABNORMAL
EOSINOPHIL # BLD AUTO: 0.2 K/UL
EOSINOPHIL NFR BLD: 1.8 %
ERYTHROCYTE [DISTWIDTH] IN BLOOD BY AUTOMATED COUNT: 12.9 %
EST. GFR  (AFRICAN AMERICAN): >60 ML/MIN/1.73 M^2
EST. GFR  (NON AFRICAN AMERICAN): >60 ML/MIN/1.73 M^2
GLUCOSE SERPL-MCNC: 103 MG/DL
HCT VFR BLD AUTO: 45.3 %
HDL/CHOLESTEROL RATIO: 33.9 %
HDLC SERPL-MCNC: 174 MG/DL
HDLC SERPL-MCNC: 59 MG/DL
HGB BLD-MCNC: 15.3 G/DL
LDLC SERPL CALC-MCNC: 89.2 MG/DL
LYMPHOCYTES # BLD AUTO: 2 K/UL
LYMPHOCYTES NFR BLD: 20.3 %
MCH RBC QN AUTO: 32.6 PG
MCHC RBC AUTO-ENTMCNC: 33.8 %
MCV RBC AUTO: 96 FL
MONOCYTES # BLD AUTO: 0.9 K/UL
MONOCYTES NFR BLD: 9.5 %
NEUTROPHILS # BLD AUTO: 6.5 K/UL
NEUTROPHILS NFR BLD: 68 %
NONHDLC SERPL-MCNC: 115 MG/DL
PLATELET # BLD AUTO: 204 K/UL
PMV BLD AUTO: 10.4 FL
POTASSIUM SERPL-SCNC: 3.1 MMOL/L
PROT SERPL-MCNC: 7.3 G/DL
RBC # BLD AUTO: 4.7 M/UL
SODIUM SERPL-SCNC: 140 MMOL/L
TRIGL SERPL-MCNC: 129 MG/DL
WBC # BLD AUTO: 9.6 K/UL

## 2017-02-20 PROCEDURE — 85025 COMPLETE CBC W/AUTO DIFF WBC: CPT

## 2017-02-20 PROCEDURE — 80061 LIPID PANEL: CPT

## 2017-02-20 PROCEDURE — 36415 COLL VENOUS BLD VENIPUNCTURE: CPT | Mod: PO

## 2017-02-20 PROCEDURE — 80053 COMPREHEN METABOLIC PANEL: CPT

## 2017-02-20 PROCEDURE — 83880 ASSAY OF NATRIURETIC PEPTIDE: CPT

## 2017-02-27 ENCOUNTER — OFFICE VISIT (OUTPATIENT)
Dept: INTERNAL MEDICINE | Facility: CLINIC | Age: 82
End: 2017-02-27
Payer: MEDICARE

## 2017-02-27 VITALS
BODY MASS INDEX: 27.74 KG/M2 | WEIGHT: 172.63 LBS | HEIGHT: 66 IN | TEMPERATURE: 98 F | OXYGEN SATURATION: 98 % | SYSTOLIC BLOOD PRESSURE: 148 MMHG | DIASTOLIC BLOOD PRESSURE: 80 MMHG | HEART RATE: 86 BPM

## 2017-02-27 DIAGNOSIS — Z85.038 HISTORY OF COLON CANCER: ICD-10-CM

## 2017-02-27 DIAGNOSIS — Z85.51 HISTORY OF BLADDER CANCER: ICD-10-CM

## 2017-02-27 DIAGNOSIS — J44.9 COPD, SEVERE: Chronic | ICD-10-CM

## 2017-02-27 DIAGNOSIS — R09.02 HYPOXEMIA REQUIRING SUPPLEMENTAL OXYGEN: ICD-10-CM

## 2017-02-27 DIAGNOSIS — I48.20 ATRIAL FIBRILLATION, CHRONIC: ICD-10-CM

## 2017-02-27 DIAGNOSIS — E78.2 MIXED HYPERLIPIDEMIA: ICD-10-CM

## 2017-02-27 DIAGNOSIS — I10 ESSENTIAL HYPERTENSION: Primary | ICD-10-CM

## 2017-02-27 DIAGNOSIS — I25.10 CAD IN NATIVE ARTERY: ICD-10-CM

## 2017-02-27 DIAGNOSIS — R73.01 IMPAIRED FASTING GLUCOSE: ICD-10-CM

## 2017-02-27 DIAGNOSIS — K21.9 GASTROESOPHAGEAL REFLUX DISEASE, ESOPHAGITIS PRESENCE NOT SPECIFIED: ICD-10-CM

## 2017-02-27 DIAGNOSIS — Z99.81 HYPOXEMIA REQUIRING SUPPLEMENTAL OXYGEN: ICD-10-CM

## 2017-02-27 PROCEDURE — 99999 PR PBB SHADOW E&M-EST. PATIENT-LVL IV: CPT | Mod: PBBFAC,,, | Performed by: INTERNAL MEDICINE

## 2017-02-27 PROCEDURE — 99214 OFFICE O/P EST MOD 30 MIN: CPT | Mod: S$PBB,,, | Performed by: INTERNAL MEDICINE

## 2017-02-27 PROCEDURE — 99214 OFFICE O/P EST MOD 30 MIN: CPT | Mod: PBBFAC,PO | Performed by: INTERNAL MEDICINE

## 2017-02-27 RX ORDER — POTASSIUM CHLORIDE 20 MEQ/1
20 TABLET, EXTENDED RELEASE ORAL ONCE
Qty: 90 TABLET | Refills: 3 | Status: SHIPPED | OUTPATIENT
Start: 2017-02-27 | End: 2017-02-27

## 2017-02-27 NOTE — MR AVS SNAPSHOT
[facility]  [address] [place] [address]  Phone: [phone]                  Kelton Mcdaniels   2017 8:40 AM   Office Visit    Description:  Male : 12/3/1928   Provider:  Sunny Plummer MD   Department:  [facility]           Reason for Visit     Annual Exam           Diagnoses this Visit        Comments    Essential hypertension    -  Primary     Impaired fasting glucose         Mixed hyperlipidemia         CAD in native artery         COPD, severe         Hypoxemia requiring supplemental oxygen         Gastroesophageal reflux disease, esophagitis presence not specified         History of bladder cancer         History of colon cancer         Atrial fibrillation, chronic                To Do List           Future Appointments        Provider Department Dept Phone    3/1/2017 1:15 PM CRESCENCIO Phillips MD [place] - Ophthalmology [phone]    2017 9:00 AM ONLH XR1-[facility] [phone]    2017 9:20 AM PULMONARY LAB, [facility] [phone]    2017 9:40 AM Vinicius Cat MD [facility] [phone]    2017 8:40 AM LABORATORY, [facility] - Laboratory [phone]      Goals (5 Years of Data)     None      Follow-Up and Disposition     Return in about 6 months (around 2017).    Follow-up and Disposition History       These Medications        Disp Refills Start End    potassium chloride SA (K-DUR,KLOR-CON) 20 MEQ tablet 90 tablet 3 2017    Take 1 tablet (20 mEq total) by mouth once. - Oral    Pharmacy: [facility] [address] [place] [address] [address] #: [phone]         [facility]     [facility] -  Assistance  Registered nurses in the [facility] provide clinical advisement, health education, appointment booking, and other advisory services.  Call for this free service at  [phone].             Medications           Message regarding Medications     Verify the changes and/or additions to your medication regime listed below are the same as discussed with your clinician today.  If any of these changes or additions are incorrect, please notify your healthcare provider.        START taking these NEW medications        Refills    potassium chloride SA (K-DUR,KLOR-CON) 20 MEQ tablet 3    Sig: Take 1 tablet (20 mEq total) by mouth once.    Class: Normal    Route: Oral           Verify that the below list of medications is an accurate representation of the medications you are currently taking.  If none reported, the list may be blank. If incorrect, please contact your healthcare provider. Carry this list with you in case of emergency.           Current Medications     albuterol (PROAIR HFA) 90 mcg/actuation inhaler Inhale 2 puffs into the lungs every 4 (four) hours as needed.    albuterol-ipratropium 2.5mg-0.5mg/3mL (DUO-NEB) 0.5 mg-3 mg(2.5 mg base)/3 mL nebulizer solution Take 3 mLs by nebulization every 6 (six) hours while awake. DX: J44.9    carvedilol (COREG) 25 MG tablet TAKE ONE TABLET BY MOUTH TWICE DAILY    doxycycline (MONODOX) 100 MG capsule Take 1 capsule (100 mg total) by mouth 2 (two) times daily.    finasteride (PROSCAR) 5 mg tablet Take 1 tablet (5 mg total) by mouth once daily.    fluticasone-salmeterol 250-50 mcg/dose (ADVAIR DISKUS) 250-50 mcg/dose diskus inhaler INHALE ONE DOSE BY MOUTH TWICE DAILY    hydrochlorothiazide (HYDRODIURIL) 25 MG tablet Take 1 tablet (25 mg total) by mouth once daily.    mucus clearing device (ACAPELLA) Sariah 1 Device by Misc.(Non-Drug; Combo Route) route every 4 to 6 hours as needed.    omeprazole (PRILOSEC) 20 MG capsule Take 1 capsule (20 mg total) by mouth once daily.    predniSONE (DELTASONE) 10 MG tablet Take 1 tablet (10 mg total) by mouth once daily.    roflumilast (DALIRESP) 500 mcg Tab Take 1 tablet (500 mcg total) by mouth once  daily.    simvastatin (ZOCOR) 40 MG tablet Take 1 tablet (40 mg total) by mouth every evening.    tiotropium bromide 2.5 mcg/actuation Mist Inhale 5 mcg into the lungs once daily.    alprazolam (XANAX) 0.25 MG tablet Take 1 tablet (0.25 mg total) by mouth daily as needed for Anxiety. For panic attacks    butenafine 1 % cream Apply topically 2 (two) times daily.    gabapentin (NEURONTIN) 100 MG capsule Take 1 capsule (100 mg total) by mouth every evening.    nitroGLYCERIN (NITROSTAT) 0.4 MG SL tablet Place 1 tablet (0.4 mg total) under the tongue every 5 (five) minutes as needed for Chest pain.    potassium chloride SA (K-DUR,KLOR-CON) 20 MEQ tablet Take 1 tablet (20 mEq total) by mouth once.           Clinical Reference Information           Your Vitals Were     BP                   148/80           Blood Pressure          Most Recent Value    BP  (!)  148/80      Allergies as of 2/27/2017     No Known Allergies      Immunizations Administered on Date of Encounter - 2/27/2017     None      Orders Placed During Today's Visit     Future Labs/Procedures Expected by Expires    CBC auto differential  8/26/2017 (Approximate) 4/28/2018    Comprehensive metabolic panel  8/26/2017 (Approximate) 4/28/2018    Lipid panel  8/26/2017 (Approximate) 4/28/2018    TSH  8/26/2017 (Approximate) 4/28/2018      [facility] Sign-Up     Activating your [facility] account is as easy as 1-2-3!     1) Visit [email or web address], select Sign Up Now, enter this activation code and your date of birth, then select Next.  [email or web address]  Expires: 4/13/2017  8:58 AM      2) Create a username and password to use when you visit [facility] in the future and select a security question in case you lose your password and select Next.    3) Enter your e-mail address and click Sign Up!    Additional Information  If you have questions, please e-mail [email or web address] or call [phone] to talk to our [facility] staff. Remember, [facility] is NOT to be used  for urgent needs. For medical emergencies, dial 911.         Language Assistance Services     ATTENTION: Language assistance services are available, free of charge. Please call [phone].      ATENCIÓN: Si habla patrica, tiene a segal disposición servicios gratuitos de asistencia lingüística. Llame al [phone].     CHÚ Ý: N?u b?n nói Ti?ng Vi?t, có các d?ch v? h? tr? ngôn ng? mi?n phí dành cho b?n. G?i s? [phone].         [facility] complies with applicable Federal civil rights laws and does not discriminate on the basis of race, color, national origin, age, disability, or sex.

## 2017-03-01 ENCOUNTER — PROCEDURE VISIT (OUTPATIENT)
Dept: OPHTHALMOLOGY | Facility: CLINIC | Age: 82
End: 2017-03-01
Payer: MEDICARE

## 2017-03-01 DIAGNOSIS — H35.3210 AGE-RELATED MACULAR DEGENERATION, WET, RIGHT EYE: Primary | ICD-10-CM

## 2017-03-01 PROCEDURE — 92134 CPTRZ OPH DX IMG PST SGM RTA: CPT | Mod: PBBFAC,PO | Performed by: OPHTHALMOLOGY

## 2017-03-01 PROCEDURE — 99213 OFFICE O/P EST LOW 20 MIN: CPT | Mod: S$PBB,,, | Performed by: OPHTHALMOLOGY

## 2017-03-01 NOTE — PROGRESS NOTES
.  ===============================  Kelton CAMARILLO Mcdaniels,   88 y.o. male   Last visit [facility]: :1/20/2017   Last visit eye dept. 1/20/2017  [facility]:  Corrected distance visual acuity was 20/400 in the right eye and 20/30 in the left eye.   Not recorded         Not recorded         Not recorded        Chief Complaint   Patient presents with    SRN     eylea od # 2        HPI     SRN    Additional comments: eylea od # 2           Comments   --Smd/srn od dx. 10/5/15   --2+ vac cat ou      BEGAN EYLEA OD 1/20/17  H/o Avastin       Last edited by VALERIA White on 3/1/2017  1:05 PM. (History)      Read Studies: y  Vitalsy    ________________  3/1/2017  1. Age-related macular degeneration, wet, right eye        Last 11/16 worse  Sp eyelax2 oct looks great - no vsiaul differnec   Oct good todat   \follow carefully ANY decline - call to resume   rtc 1 mo       ===============================

## 2017-03-01 NOTE — MR AVS SNAPSHOT
[facility] - Ophthalmology  [address] [facility] [address] [place] [address]  Phone: [phone]  Fax: [phone]                  Kelton Mcdaniels   3/1/2017 1:15 PM   Procedure visit    Description:  Male : 12/3/1928   Provider:  CRESCENCIO Phillips MD   Department:  [facility]           Reason for Visit     SRN           Diagnoses this Visit        Comments    Age-related macular degeneration, wet, right eye    -  Primary            To Do List           Future Appointments        Provider Department Dept Phone    2017 9:00 AM ONLH XR1-DR [facility] [phone]    2017 9:20 AM PULMONARY LAB, [facility] - Pulm Function [facility] [phone]    2017 9:40 AM MD [facility] - Pulmonary Services [phone]    2017 8:40 AM LABORATORY, [facility] - Laboratory [phone]    2017 8:40 AM Sunny Plummer MD [facility] Internal Medicine [phone]      Goals (5 Years of Data)     None      Follow-Up and Disposition     Return in about 4 weeks (around 3/29/2017) for follow up.      [facility]     [facility] Care [facility] -  Assistance  Registered nurses in the [facility] provide clinical advisement, health education, appointment booking, and other advisory services.  Call for this free service at [phone].             Medications           Message regarding Medications     Verify the changes and/or additions to your medication regime listed below are the same as discussed with your clinician today.  If any of these changes or additions are incorrect, please notify your healthcare provider.             Verify that the below list of medications is an accurate representation of the medications you are currently taking.  If none reported, the list may be blank. If incorrect, please contact your healthcare provider. Carry this list with you in case of emergency.           Current Medications     albuterol (PROAIR HFA) 90  mcg/actuation inhaler Inhale 2 puffs into the lungs every 4 (four) hours as needed.    albuterol-ipratropium 2.5mg-0.5mg/3mL (DUO-NEB) 0.5 mg-3 mg(2.5 mg base)/3 mL nebulizer solution Take 3 mLs by nebulization every 6 (six) hours while awake. DX: J44.9    alprazolam (XANAX) 0.25 MG tablet Take 1 tablet (0.25 mg total) by mouth daily as needed for Anxiety. For panic attacks    butenafine 1 % cream Apply topically 2 (two) times daily.    carvedilol (COREG) 25 MG tablet TAKE ONE TABLET BY MOUTH TWICE DAILY    doxycycline (MONODOX) 100 MG capsule Take 1 capsule (100 mg total) by mouth 2 (two) times daily.    finasteride (PROSCAR) 5 mg tablet Take 1 tablet (5 mg total) by mouth once daily.    fluticasone-salmeterol 250-50 mcg/dose (ADVAIR DISKUS) 250-50 mcg/dose diskus inhaler INHALE ONE DOSE BY MOUTH TWICE DAILY    gabapentin (NEURONTIN) 100 MG capsule Take 1 capsule (100 mg total) by mouth every evening.    hydrochlorothiazide (HYDRODIURIL) 25 MG tablet Take 1 tablet (25 mg total) by mouth once daily.    mucus clearing device (ACAPELLA) Sariah 1 Device by Misc.(Non-Drug; Combo Route) route every 4 to 6 hours as needed.    nitroGLYCERIN (NITROSTAT) 0.4 MG SL tablet Place 1 tablet (0.4 mg total) under the tongue every 5 (five) minutes as needed for Chest pain.    omeprazole (PRILOSEC) 20 MG capsule Take 1 capsule (20 mg total) by mouth once daily.    predniSONE (DELTASONE) 10 MG tablet Take 1 tablet (10 mg total) by mouth once daily.    roflumilast (DALIRESP) 500 mcg Tab Take 1 tablet (500 mcg total) by mouth once daily.    simvastatin (ZOCOR) 40 MG tablet Take 1 tablet (40 mg total) by mouth every evening.    tiotropium bromide 2.5 mcg/actuation Mist Inhale 5 mcg into the lungs once daily.           Clinical Reference Information           Allergies as of 3/1/2017     No Known Allergies      Immunizations Administered on Date of Encounter - 3/1/2017     None      Orders Placed During Today's Visit      Normal Orders This  Visit    Posterior Segment OCT Retina-Both eyes       [facility] Sign-Up     Activating your [facility] account is as easy as 1-2-3!     1) Visit [email or web address], select Sign Up Now, enter this activation code and your date of birth, then select Next.  [email or web address]  Expires: 4/13/2017  8:58 AM      2) Create a username and password to use when you visit [facility] in the future and select a security question in case you lose your password and select Next.    3) Enter your e-mail address and click Sign Up!    Additional Information  If you have questions, please e-mail [email or web address] or call [phone] to talk to our [email or web address] staff. Remember, [email or web address] is NOT to be used for urgent needs. For medical emergencies, dial 911.         Language Assistance Services     ATTENTION: Language assistance services are available, free of charge. Please call [phone].      ATENCIÓN: Si habla patrica, tiene a segal disposición servicios gratuitos de asistencia lingüística. Llame al [phone].     CHÚ Ý: N?u b?n nói Ti?ng Vi?t, có các d?ch v? h? tr? ngôn ng? mi?n phí dành cho b?n. G?i s? [phone].         [facility] complies with applicable Federal civil rights laws and does not discriminate on the basis of race, color, national origin, age, disability, or sex.

## 2017-03-01 NOTE — PROGRESS NOTES
===============================  Kelton CAMARILLO Mcdaniels,   88 y.o. male   Last visit [facility]: :1/20/2017   Last visit eye dept. 1/20/2017  [facility]:  Corrected distance visual acuity was 20/400 in the right eye and 20/30 in the left eye.   Not recorded         Not recorded         Not recorded        Chief Complaint   Patient presents with    SRN     eylea od # 2, pt states vision is not improved        HPI     SRN    Additional comments: eylea od # 2, pt states vision is not improved           Comments   --Smd/srn od dx. 10/5/15   --2+ vac cat ou      BEGAN EYLEA OD 1/20/17  H/o Avastin       Last edited by CRESCENCIO Phillips MD on 3/1/2017  1:31 PM. (History)      Read Studies: y  Vitalsy    ________________  3/1/2017  1. Age-related macular degeneration, wet, right eye      Today moct od looks good, has been quite stable  Last worse 11/2016  However vision has not improved after a course of avastin and 2 eylea   No tx today but pt instructed to call 24 / 7 with any worsening  rtc 4 weeks       ===========================

## 2017-03-04 ENCOUNTER — HOSPITAL ENCOUNTER (EMERGENCY)
Facility: HOSPITAL | Age: 82
Discharge: HOME | End: 2017-03-04
Attending: EMERGENCY MEDICINE
Payer: MEDICARE

## 2017-03-04 VITALS
SYSTOLIC BLOOD PRESSURE: 155 MMHG | DIASTOLIC BLOOD PRESSURE: 77 MMHG | RESPIRATION RATE: 27 BRPM | WEIGHT: 175 LBS | BODY MASS INDEX: 28.25 KG/M2 | TEMPERATURE: 96 F | OXYGEN SATURATION: 99 % | HEART RATE: 80 BPM

## 2017-03-04 DIAGNOSIS — I10 ESSENTIAL HYPERTENSION: ICD-10-CM

## 2017-03-04 DIAGNOSIS — R33.9 URINARY RETENTION: Primary | ICD-10-CM

## 2017-03-04 LAB
BILIRUB UR QL STRIP: NEGATIVE
CLARITY UR: CLEAR
COLOR UR: YELLOW
GLUCOSE UR QL STRIP: NEGATIVE
HGB UR QL STRIP: ABNORMAL
KETONES UR QL STRIP: NEGATIVE
LEUKOCYTE ESTERASE UR QL STRIP: NEGATIVE
NITRITE UR QL STRIP: NEGATIVE
PH UR STRIP: 7 [PH] (ref 5–8)
PROT UR QL STRIP: NEGATIVE
SP GR UR STRIP: <=1.005 (ref 1–1.03)
URN SPEC COLLECT METH UR: ABNORMAL
UROBILINOGEN UR STRIP-ACNC: NEGATIVE EU/DL

## 2017-03-04 PROCEDURE — 25000003 PHARM REV CODE 250: Performed by: EMERGENCY MEDICINE

## 2017-03-04 PROCEDURE — 87086 URINE CULTURE/COLONY COUNT: CPT

## 2017-03-04 PROCEDURE — 81003 URINALYSIS AUTO W/O SCOPE: CPT

## 2017-03-04 PROCEDURE — 99283 EMERGENCY DEPT VISIT LOW MDM: CPT

## 2017-03-04 PROCEDURE — 51702 INSERT TEMP BLADDER CATH: CPT

## 2017-03-04 RX ORDER — TAMSULOSIN HYDROCHLORIDE 0.4 MG/1
0.4 CAPSULE ORAL DAILY
Qty: 30 CAPSULE | Refills: 0 | Status: SHIPPED | OUTPATIENT
Start: 2017-03-04 | End: 2018-03-14 | Stop reason: SDUPTHER

## 2017-03-04 RX ORDER — TAMSULOSIN HYDROCHLORIDE 0.4 MG/1
0.4 CAPSULE ORAL
Status: COMPLETED | OUTPATIENT
Start: 2017-03-04 | End: 2017-03-04

## 2017-03-04 RX ADMIN — TAMSULOSIN HYDROCHLORIDE 0.4 MG: 0.4 CAPSULE ORAL at 11:03

## 2017-03-04 NOTE — ED AVS SNAPSHOT
[facility] -   [address] [facility] [address] [place] [address]               Kelton Mcdaniels   3/4/2017  9:34 AM   ED    Description:  Male : 12/3/1928   Department:  [facility]           Your Care was Coordinated By:     Provider Role From To    Anabel Estrada DO Attending Provider 17 0956 --      Reason for Visit     Urinary Retention           Diagnoses this Visit        Comments    Urinary retention    -  Primary     Essential hypertension           ED Disposition     None           To Do List           Follow-up Information     Follow up with Freddy Ansari IV, MD. Schedule an appointment as soon as possible for a visit in 3 days.    Specialty:  Urology    Why:  Return to the ED for:  difficulty urinating, clots in urine, vomiting, weakness, nausea or other concerns.     Contact information:    [address] [place] [address] [address] [place] [address]  [phone]         These Medications        Disp Refills Start End    tamsulosin (FLOMAX) 0.4 mg Cp24 30 capsule 0 3/4/2017 4/3/2017    Take 1 capsule (0.4 mg total) by mouth once daily. - Oral    Pharmacy: [facility] [address] [place] [address] [address] #: [phone]         [facility]     [facility] Care [facility] -  Assistance  Registered nurses in the [facility] provide clinical advisement, health education, appointment booking, and other advisory services.  Call for this free service at [phone].             Medications           Message regarding Medications     Verify the changes and/or additions to your medication regime listed below are the same as discussed with your clinician today.  If any of these changes or additions are incorrect, please notify your healthcare provider.        START taking these NEW medications        Refills    tamsulosin (FLOMAX) 0.4 mg Cp24 0    Sig: Take 1 capsule (0.4 mg total) by mouth once daily.    Class: Print     Route: Oral      These medications were administered today        Dose Freq    tamsulosin 24 hr capsule 0.4 mg 0.4 mg ED 1 Time    Sig: Take 1 capsule (0.4 mg total) by mouth ED 1 Time.    Class: Normal    Route: Oral           Verify that the below list of medications is an accurate representation of the medications you are currently taking.  If none reported, the list may be blank. If incorrect, please contact your healthcare provider. Carry this list with you in case of emergency.           Current Medications     albuterol (PROAIR HFA) 90 mcg/actuation inhaler Inhale 2 puffs into the lungs every 4 (four) hours as needed.    albuterol-ipratropium 2.5mg-0.5mg/3mL (DUO-NEB) 0.5 mg-3 mg(2.5 mg base)/3 mL nebulizer solution Take 3 mLs by nebulization every 6 (six) hours while awake. DX: J44.9    alprazolam (XANAX) 0.25 MG tablet Take 1 tablet (0.25 mg total) by mouth daily as needed for Anxiety. For panic attacks    butenafine 1 % cream Apply topically 2 (two) times daily.    carvedilol (COREG) 25 MG tablet TAKE ONE TABLET BY MOUTH TWICE DAILY    doxycycline (MONODOX) 100 MG capsule Take 1 capsule (100 mg total) by mouth 2 (two) times daily.    finasteride (PROSCAR) 5 mg tablet Take 1 tablet (5 mg total) by mouth once daily.    fluticasone-salmeterol 250-50 mcg/dose (ADVAIR DISKUS) 250-50 mcg/dose diskus inhaler INHALE ONE DOSE BY MOUTH TWICE DAILY    gabapentin (NEURONTIN) 100 MG capsule Take 1 capsule (100 mg total) by mouth every evening.    hydrochlorothiazide (HYDRODIURIL) 25 MG tablet Take 1 tablet (25 mg total) by mouth once daily.    mucus clearing device (ACAPELLA) Sariah 1 Device by Misc.(Non-Drug; Combo Route) route every 4 to 6 hours as needed.    nitroGLYCERIN (NITROSTAT) 0.4 MG SL tablet Place 1 tablet (0.4 mg total) under the tongue every 5 (five) minutes as needed for Chest pain.    omeprazole (PRILOSEC) 20 MG capsule Take 1 capsule (20 mg total) by mouth once daily.    predniSONE (DELTASONE) 10 MG  tablet Take 1 tablet (10 mg total) by mouth once daily.    roflumilast (DALIRESP) 500 mcg Tab Take 1 tablet (500 mcg total) by mouth once daily.    simvastatin (ZOCOR) 40 MG tablet Take 1 tablet (40 mg total) by mouth every evening.    tamsulosin (FLOMAX) 0.4 mg Cp24 Take 1 capsule (0.4 mg total) by mouth once daily.    tamsulosin 24 hr capsule 0.4 mg Take 1 capsule (0.4 mg total) by mouth ED 1 Time.    tiotropium bromide 2.5 mcg/actuation Mist Inhale 5 mcg into the lungs once daily.           Clinical Reference Information           Your Vitals Were     BP Pulse Temp Resp Weight SpO2    133/78 98 96 °F (35.6 °C) (Axillary) 38 79.4 kg (175 lb) 98%    BMI                28.25 kg/m2          Allergies as of 3/4/2017        Reactions    No Known Allergies       Immunizations Administered on Date of Encounter - 3/4/2017     None      ED Micro, Lab, POCT     Start Ordered       Status Ordering Provider    03/04/17 1010 03/04/17 1009  Urinalysis  STAT      Final result     03/04/17 1010 03/04/17 1009  Urine culture **CANNOT BE ORDERED STAT**  Once      In process       ED Imaging Orders     None      Discharge References/Attachments     PINO CATHETER, CARE (ENGLISH)    URINARY RETENTION, MALE (ENGLISH)    TAMSULOSIN CAPSULES (ENGLISH)      Your Scheduled Appointments     Mar 29, 2017  2:15 PM CDT   Procedure with CRESCENCIO Phillips MD   [facility] - Op[facility]mology ([facility])    [address] [facility] [address]   [phone]            Jul 12, 2017  9:00 AM CDT   Diagnostic Xray with ON[facility] XR1-   [facility]    [address] [place] [id number] [phone]            Jul 12, 2017  9:20 AM CDT   Spirometry with PULMONARY LAB, [facility]   [facility] Pulm [facility] ([facility])    [address] [place] [address]   [phone]            Jul 12, 2017  9:40 AM CDT   Established Patient Visit with MD [facility] ([facility])     [address] [facility] [address] [phone]            Aug 23, 2017  8:40 AM CDT   Fasting Lab with LABORATORY, [facility]   [facility]    [address] [facility] [address] [phone]              [facility] Sign-Up     Activating your [facility] account is as easy as 1-2-3!     1) Visit [email or web address], select Sign Up Now, enter this activation code and your date of birth, then select Next.  [email or web address]  Expires: 4/13/2017  8:58 AM      2) Create a username and password to use when you visit [facility] in the future and select a security question in case you lose your password and select Next.    3) Enter your e-mail address and click Sign Up!    Additional Information  If you have questions, please e-mail [email or web address] or call [phone] to talk to our [facility] staff. Remember, [facility] is NOT to be used for urgent needs. For medical emergencies, dial 911.         Smoking Cessation     If you would like to quit smoking:   You may be eligible for free services if you are a [place] resident and started smoking cigarettes before September 1, 1988.  Call the Smoking Cessation Trust (SCT) toll free at (133) 319-2638 or (571) 557-3007.   Call 1-800-QUIT-NOW if you do not meet the above criteria.             [facility]  complies with applicable Federal civil rights laws and does not discriminate on the basis of race, color, national origin, age, disability, or sex.        Language Assistance Services     ATTENTION: Language assistance services are available, free of charge. Please call [phone].      ATENCIÓN: Si habla patrica, tiene a segal disposición servicios gratuitos de asistencia lingüística. Llame al [phone].     CHÚ Ý: N?u b?n nói Ti?ng Vi?t, có các d?ch v? h? tr? ngôn ng? mi?n phí dành cho b?n. G?i s? [phone].

## 2017-03-04 NOTE — ED PROVIDER NOTES
SCRIBE #1 NOTE: I, Bull Schwartz, am scribing for, and in the presence of, Anabel Estrada DO. I have scribed the entire note.      History      Chief Complaint   Patient presents with    Urinary Retention     pt states he is unable to empty bladder.  cystoscope 2 weeks ago with history of bladder cancer       Review of patient's allergies indicates:   Allergen Reactions    No known allergies         HPI   HPI    3/4/2017, 10:30 AM   History obtained from the patient and wife      History of Present Illness: Kelton Mcdaniels is a 88 y.o. male patient with a PMHx of bladder cancer, who presents to the Emergency Department for difficulty urinating which onset gradually yesterday. Sxs are episodic and moderate in severity. Sxs described as incomplete emptying. There are no mitigating or exacerbating factors noted. Associated sxs include mild dysuria.  Pt denies any fever, N/V/D, abd pain, hematuria, constipation, blood in stool, chills, and all other sxs at this time. Pt reports cytoscope performed by Dr. Ansari (Urology) 2 weeks ago which resulted enlarged prostate. No further complaints or concerns at this time.  Patient has a history of COPD.  Patient denies any worsening dyspnea, exertional dyspnea, orthopnea.      Arrival mode: Personal vehicle     PCP: Sunny Plummer MD       Past Medical History:  Past Medical History:   Diagnosis Date    CAD in native artery 12/18/2015    Cataract     Chronic airway obstruction, not elsewhere classified     Coronary atherosclerosis of unspecified type of vessel, native or graft     Esophageal reflux     Essential hypertension     History of bladder cancer     History of colon cancer     Hyperlipidemia LDL goal <70     Hypertrophy of prostate without urinary obstruction and other lower urinary tract symptoms (LUTS)     Impaired fasting glucose     Macular degeneration     Pulmonary hypertension 12/18/2015    TR (tricuspid regurgitation) 12/18/2015        Past Surgical History:  Past Surgical History:   Procedure Laterality Date    BACK SURGERY      COLON SURGERY      CORONARY ARTERY BYPASS GRAFT      partial coloectomy for large polyp      turbt           Family History:  Family History   Problem Relation Age of Onset    Heart failure Mother     Diabetes Father     Cancer Brother     Asthma Paternal Uncle        Social History:  Social History     Social History Main Topics    Smoking status: Former Smoker     Packs/day: 2.00     Years: 70.00     Types: Cigarettes    Smokeless tobacco: Never Used    Alcohol use No    Drug use: No    Sexual activity: No       ROS   Review of Systems   Constitutional: Negative for fever.   HENT: Negative for sore throat.    Respiratory: Negative for shortness of breath.    Cardiovascular: Negative for chest pain.   Gastrointestinal: Negative for abdominal pain, constipation, diarrhea, nausea and vomiting.   Genitourinary: Positive for difficulty urinating (incomplete emptying) and dysuria. Negative for flank pain, hematuria and testicular pain.   Musculoskeletal: Negative for back pain.   Skin: Negative for rash.   Neurological: Negative for weakness.   Hematological: Does not bruise/bleed easily.     Physical Exam      Initial Vitals   BP Pulse Resp Temp SpO2   03/04/17 0941 03/04/17 0941 03/04/17 0941 03/04/17 0941 03/04/17 0941   133/78 98 38 96 °F (35.6 °C) 98 %      Physical Exam  Nursing Notes and Vital Signs Reviewed.  Constitutional: Patient is in no acute distress. Awake and alert. Well-developed and well-nourished.  Head: Atraumatic. Normocephalic.  Eyes: PERRL. EOM intact. Conjunctivae are not pale. No scleral icterus.  ENT: Mucous membranes are moist. Oropharynx is clear and symmetric.    Neck: Supple. Full ROM. No lymphadenopathy.  Cardiovascular: Regular rate. Regular rhythm. No murmurs, rubs, or gallops. Distal pulses are 2+ and symmetric.  Pulmonary/Chest: No respiratory distress. Clear to  auscultation bilaterally. No wheezing, rales, or rhonchi.  Abdominal: Soft and non-distended.  There is no tenderness.  No rebound, guarding, or rigidity. Good bowel sounds.  Genitourinary: No CVA tenderness  Musculoskeletal: Moves all extremities. No obvious deformities. No edema. No calf tenderness.  Skin: Warm and dry.  Neurological:  Alert, awake, and appropriate.  Normal speech.  No acute focal neurological deficits are appreciated.  Psychiatric: Normal affect. Good eye contact. Appropriate in content.    ED Course    Procedures  ED Vital Signs:  Vitals:    03/04/17 0941 03/04/17 1000 03/04/17 1107   BP: 133/78  (!) 155/77   Pulse: 98 98 80   Resp: (!) 38  (!) 27   Temp: 96 °F (35.6 °C)     TempSrc: Axillary     SpO2: 98%  99%   Weight: 79.4 kg (175 lb)         Abnormal Lab Results:  Labs Reviewed   URINALYSIS - Abnormal; Notable for the following:        Result Value    Specific Gravity, UA <=1.005 (*)     Occult Blood UA Trace (*)     All other components within normal limits   CULTURE, URINE        All Lab Results:  Results for orders placed or performed during the hospital encounter of 03/04/17   Urinalysis   Result Value Ref Range    Specimen UA Urine, Catheterized     Color, UA Yellow Yellow, Straw, Purnima    Appearance, UA Clear Clear    pH, UA 7.0 5.0 - 8.0    Specific Gravity, UA <=1.005 (A) 1.005 - 1.030    Protein, UA Negative Negative    Glucose, UA Negative Negative    Ketones, UA Negative Negative    Bilirubin (UA) Negative Negative    Occult Blood UA Trace (A) Negative    Nitrite, UA Negative Negative    Urobilinogen, UA Negative <2.0 EU/dL    Leukocytes, UA Negative Negative               The Emergency Provider reviewed the vital signs and test results, which are outlined above.    ED Discussion     10:52 AM: Post void residual = 635 ml    11:07 AM: Reassessed pt. Pt states their condition has improved at this time.  Discussed with pt all pertinent ED information and results. Discussed plan of  treatment with pt. Gave pt all f/u and return to the ED instructions. All questions and concerns were addressed at this time. Pt understands and agrees to plan as discussed. Pt is stable for discharge.       ED Medication(s):  Medications   tamsulosin 24 hr capsule 0.4 mg (0.4 mg Oral Given 3/4/17 1113)       Discharge Medication List as of 3/4/2017 10:55 AM      START taking these medications    Details   tamsulosin (FLOMAX) 0.4 mg Cp24 Take 1 capsule (0.4 mg total) by mouth once daily., Starting 3/4/2017, Until Mon 4/3/17, Print             Follow-up Information     Follow up with Freddy Ansari IV, MD. Schedule an appointment as soon as possible for a visit in 3 days.    Specialty:  Urology    Why:  Return to the ED for:  difficulty urinating, clots in urine, vomiting, weakness, nausea or other concerns.     Contact information:    [address] [place] [address] [address] [place] [address] [phone]              Medical Decision Making    Medical Decision Making:   Clinical Tests:   Lab Tests: Ordered and Reviewed           Scribe Attestation:   Scribe #1: I performed the above scribed service and the documentation accurately describes the services I performed. I attest to the accuracy of the note.    Attending:   Physician Attestation Statement for Scribe #1: I, Anabel Estrada DO, personally performed the services described in this documentation, as scribed by Bull Schwartz, in my presence, and it is both accurate and complete.          Clinical Impression       ICD-10-CM ICD-9-CM   1. Urinary retention R33.9 788.20   2. Essential hypertension I10 401.9       Disposition:   Disposition: Discharged  Condition: Stable         Anabel Estrada DO  03/04/17 1325

## 2017-03-05 LAB — BACTERIA UR CULT: NO GROWTH

## 2017-03-06 ENCOUNTER — TELEPHONE (OUTPATIENT)
Dept: UROLOGY | Facility: CLINIC | Age: 82
End: 2017-03-06

## 2017-03-06 NOTE — TELEPHONE ENCOUNTER
----- Message from Brigid Kuo sent at 3/6/2017  7:44 AM CST -----  Contact: Pt   Pt is requesting a hospital follow up to have his catheter removed./ He can be reached at [phone]

## 2017-03-07 ENCOUNTER — OFFICE VISIT (OUTPATIENT)
Dept: UROLOGY | Facility: CLINIC | Age: 82
End: 2017-03-07
Payer: MEDICARE

## 2017-03-07 VITALS — DIASTOLIC BLOOD PRESSURE: 70 MMHG | WEIGHT: 175 LBS | BODY MASS INDEX: 28.25 KG/M2 | SYSTOLIC BLOOD PRESSURE: 116 MMHG

## 2017-03-07 DIAGNOSIS — R33.9 URINARY RETENTION: Primary | ICD-10-CM

## 2017-03-07 PROCEDURE — 99214 OFFICE O/P EST MOD 30 MIN: CPT | Mod: S$PBB,,, | Performed by: UROLOGY

## 2017-03-07 PROCEDURE — 99999 PR PBB SHADOW E&M-EST. PATIENT-LVL II: CPT | Mod: PBBFAC,,, | Performed by: UROLOGY

## 2017-03-07 PROCEDURE — 99212 OFFICE O/P EST SF 10 MIN: CPT | Mod: PBBFAC | Performed by: UROLOGY

## 2017-03-07 NOTE — MR AVS SNAPSHOT
[facility]  [address] [facility] [address]  Phone: [phone]  Fax: [phone]                  Kelton Mcdaniels   3/7/2017 4:40 PM   Office Visit    Description:  Male : 12/3/1928   Provider:  Freddy Ansari IV, MD   Department:  [facility]           Diagnoses this Visit        Comments    Urinary retention    -  Primary            To Do List           Future Appointments        Provider Department Dept Phone    3/8/2017 9:20 AM [facility] [phone]    3/29/2017 2:15 PM CRESCENCIO Phillips MD [facility] [phone]    2017 9:00 AM ONLH XR1-DR [facility] [phone]    2017 9:20 AM PULMONARY LAB, [facility] [phone]    2017 9:40 AM Vinicius Cat MD [facility] - Pulmonary Services [phone]      Goals (5 Years of Data)     None      Follow-Up and Disposition     Return in about 3 months (around 2017).    Follow-up and Disposition History      [facility]     [facility] -  Assistance  Registered nurses in the [facility] provide clinical advisement, health education, appointment booking, and other advisory services.  Call for this free service at [phone].             Medications           Message regarding Medications     Verify the changes and/or additions to your medication regime listed below are the same as discussed with your clinician today.  If any of these changes or additions are incorrect, please notify your healthcare provider.             Verify that the below list of medications is an accurate representation of the medications you are currently taking.  If none reported, the list may be blank. If incorrect, please contact your healthcare provider. Carry this list with you in case of emergency.           Current Medications     albuterol (PROAIR HFA) 90 mcg/actuation inhaler Inhale 2 puffs into the lungs  every 4 (four) hours as needed.    albuterol-ipratropium 2.5mg-0.5mg/3mL (DUO-NEB) 0.5 mg-3 mg(2.5 mg base)/3 mL nebulizer solution Take 3 mLs by nebulization every 6 (six) hours while awake. DX: J44.9    carvedilol (COREG) 25 MG tablet TAKE ONE TABLET BY MOUTH TWICE DAILY    doxycycline (MONODOX) 100 MG capsule Take 1 capsule (100 mg total) by mouth 2 (two) times daily.    finasteride (PROSCAR) 5 mg tablet Take 1 tablet (5 mg total) by mouth once daily.    fluticasone-salmeterol 250-50 mcg/dose (ADVAIR DISKUS) 250-50 mcg/dose diskus inhaler INHALE ONE DOSE BY MOUTH TWICE DAILY    hydrochlorothiazide (HYDRODIURIL) 25 MG tablet Take 1 tablet (25 mg total) by mouth once daily.    mucus clearing device (ACAPELLA) Sariah 1 Device by Misc.(Non-Drug; Combo Route) route every 4 to 6 hours as needed.    omeprazole (PRILOSEC) 20 MG capsule Take 1 capsule (20 mg total) by mouth once daily.    predniSONE (DELTASONE) 10 MG tablet Take 1 tablet (10 mg total) by mouth once daily.    roflumilast (DALIRESP) 500 mcg Tab Take 1 tablet (500 mcg total) by mouth once daily.    simvastatin (ZOCOR) 40 MG tablet Take 1 tablet (40 mg total) by mouth every evening.    tamsulosin (FLOMAX) 0.4 mg Cp24 Take 1 capsule (0.4 mg total) by mouth once daily.    tiotropium bromide 2.5 mcg/actuation Mist Inhale 5 mcg into the lungs once daily.    alprazolam (XANAX) 0.25 MG tablet Take 1 tablet (0.25 mg total) by mouth daily as needed for Anxiety. For panic attacks    butenafine 1 % cream Apply topically 2 (two) times daily.    gabapentin (NEURONTIN) 100 MG capsule Take 1 capsule (100 mg total) by mouth every evening.    nitroGLYCERIN (NITROSTAT) 0.4 MG SL tablet Place 1 tablet (0.4 mg total) under the tongue every 5 (five) minutes as needed for Chest pain.           Clinical Reference Information           Your Vitals Were     BP Weight BMI          116/70 79.4 kg (175 lb) 28.25 kg/m2        Blood Pressure          Most Recent Value    BP  116/70       Allergies as of 3/7/2017     No Known Allergies      Immunizations Administered on Date of Encounter - 3/7/2017     None      [facility] Sign-Up     Activating your [facility] account is as easy as 1-2-3!     1) Visit [email or web address], select Sign Up Now, enter this activation code and your date of birth, then select Next.  [email or web address]  Expires: 4/13/2017  8:58 AM      2) Create a username and password to use when you visit [facility] in the future and select a security question in case you lose your password and select Next.    3) Enter your e-mail address and click Sign Up!    Additional Information  If you have questions, please e-mail [email or web address] or call [phone] to talk to our [facility] staff. Remember, [facility] is NOT to be used for urgent needs. For medical emergencies, dial 911.         Language Assistance Services     ATTENTION: Language assistance services are available, free of charge. Please call [phone].      ATENCIÓN: Si habla español, tiene a segal disposición servicios gratuitos de asistencia lingüística. Llame al [phone].     CHÚ Ý: N?u b?n nói Ti?ng Vi?t, có các d?ch v? h? tr? ngôn ng? mi?n phí dành cho b?n. G?i s? [phone].         [facility] complies with applicable Federal civil rights laws and does not discriminate on the basis of race, color, national origin, age, disability, or sex.

## 2017-03-07 NOTE — PROGRESS NOTES
Chief Complaint: Urinary retention    HPI:   3/7/17: Went into retention last weekend and is here with a myles.  No sig clots.  No new meds. -UCx.   ml.  2/9/17: Other than a simple left renal cyst no findings on CT Urogram. Cysto normal except BPH no recurrence of bladder cancer.  1/31/17: 89 yo man last week had a day of dark blood with clots last week.  Was dx with bladder cancer 15+ years ago.  Some surveillance cystoscopy but none in the last 10 years. No abd/pelvic pain and no exac/rel factors.  No urolithiasis.  No urinary bother.  Had 6 wks BCG with a restaging resection after; maybe more BCG after that.  Was told to worry about it no more.  Also had a TURP in that same time frame.  And some gross hematuria attributed to his prostate in 2005.    Allergies:  No known allergies    Medications:  has a current medication list which includes the following prescription(s): albuterol, albuterol-ipratropium 2.5mg-0.5mg/3ml, carvedilol, doxycycline, finasteride, fluticasone-salmeterol 250-50 mcg/dose, hydrochlorothiazide, mucus clearing device, omeprazole, prednisone, roflumilast, simvastatin, tamsulosin, tiotropium bromide, alprazolam, butenafine, gabapentin, and nitroglycerin.    Review of Systems:  General: No fever, chills, fatigability, or weight loss.  Skin: No rashes, itching, or changes in color or texture of skin.  Chest: Denies CARBAJAL, cyanosis, wheezing, cough, and sputum production.  Abdomen: Appetite fine. No weight loss. Denies diarrhea, abdominal pain, hematemesis, or blood in stool.  Musculoskeletal: No joint stiffness or swelling. Denies back pain.  : As above.  All other review of systems negative.    PMH:   has a past medical history of CAD in native artery (12/18/2015); Cataract; Chronic airway obstruction, not elsewhere classified; Coronary atherosclerosis of unspecified type of vessel, native or graft; Esophageal reflux; Essential hypertension; History of bladder cancer; History of colon  cancer; Hyperlipidemia LDL goal <70; Hypertrophy of prostate without urinary obstruction and other lower urinary tract symptoms (LUTS); Impaired fasting glucose; Macular degeneration; Pulmonary hypertension (12/18/2015); and TR (tricuspid regurgitation) (12/18/2015).    PSH:   has a past surgical history that includes partial coloectomy for large polyp; Coronary artery bypass graft; Colon surgery; Back surgery; and turbt.    FamHx: family history includes Asthma in his paternal uncle; Cancer in his brother; Diabetes in his father; Heart failure in his mother.    SocHx:  reports that he has quit smoking. His smoking use included Cigarettes. He has a 140.00 pack-year smoking history. He has never used smokeless tobacco. He reports that he does not drink alcohol or use illicit drugs.      Physical Exam:  Vitals:    03/07/17 1651   BP: 116/70     General: A&Ox3, no apparent distress, no deformities  Neck: No masses, normal thyroid  Lungs: normal inspiration, no use of accessory muscles  Heart: normal pulse, no arrhythmias  Abdomen: Soft, NT, ND, no masses, no hernias, no hepatosplenomegaly  Lymphatic: Neck and groin nodes negative  Skin: The skin is warm and dry. No jaundice.  Ext: No c/c/e.  :   2/17: test desc danyel, CARLEY normal      Impression/Plan:   1. Hematuria likely due to mildly friable prostate.  Added finasteride, RTC 1 year but back sooner with retention.  2. Will d/c myles today and recheck with PVR tomorrow.  3 mo after that.

## 2017-03-08 ENCOUNTER — CLINICAL SUPPORT (OUTPATIENT)
Dept: UROLOGY | Facility: CLINIC | Age: 82
End: 2017-03-08
Payer: MEDICARE

## 2017-03-08 VITALS — WEIGHT: 175 LBS | BODY MASS INDEX: 28.25 KG/M2

## 2017-03-08 DIAGNOSIS — R33.9 URINARY RETENTION: Primary | ICD-10-CM

## 2017-03-08 PROCEDURE — 99213 OFFICE O/P EST LOW 20 MIN: CPT | Mod: PBBFAC

## 2017-03-08 PROCEDURE — 99999 PR PBB SHADOW E&M-EST. PATIENT-LVL III: CPT | Mod: PBBFAC,,,

## 2017-03-08 NOTE — PROGRESS NOTES
Pt in today for PVR; 18ml.  MD notified.  Patient has chronic urinary retention; instructed to cath 3 times per day; patient is unable to use straight catheter and must use 14Fr Coude.

## 2017-03-09 ENCOUNTER — TELEPHONE (OUTPATIENT)
Dept: INTERNAL MEDICINE | Facility: CLINIC | Age: 82
End: 2017-03-09

## 2017-03-09 NOTE — TELEPHONE ENCOUNTER
"S/w pt. Pt c/o pain and sob. I encouraged pt to go to the ER . Pt declined. Stated, " well this happens on and off. I already went there for this and they told me I need to see my PCP. ". I worked pt in to see Dr. Plummer tomorrow morning. I also encouraged UC/ER if symptoms worsen. Verbalized understanding/TGD  "

## 2017-03-09 NOTE — TELEPHONE ENCOUNTER
----- Message from Yani Owen sent at 3/9/2017 10:27 AM CST -----  Contact: patient  Patient states he had a bad night with pain and difficulty breathing. Thinks he need his medication checked. Please call patient @ [phone]. Thanks, barbara

## 2017-03-10 ENCOUNTER — OFFICE VISIT (OUTPATIENT)
Dept: INTERNAL MEDICINE | Facility: CLINIC | Age: 82
End: 2017-03-10
Payer: MEDICARE

## 2017-03-10 VITALS
SYSTOLIC BLOOD PRESSURE: 152 MMHG | HEIGHT: 66 IN | OXYGEN SATURATION: 98 % | DIASTOLIC BLOOD PRESSURE: 80 MMHG | TEMPERATURE: 99 F | HEART RATE: 77 BPM | BODY MASS INDEX: 26.5 KG/M2 | WEIGHT: 164.88 LBS

## 2017-03-10 DIAGNOSIS — N40.0 HYPERTROPHY OF PROSTATE WITHOUT URINARY OBSTRUCTION AND OTHER LOWER URINARY TRACT SYMPTOMS (LUTS): Primary | ICD-10-CM

## 2017-03-10 DIAGNOSIS — J44.9 COPD, SEVERE: Chronic | ICD-10-CM

## 2017-03-10 DIAGNOSIS — I10 ESSENTIAL HYPERTENSION: ICD-10-CM

## 2017-03-10 PROCEDURE — 99213 OFFICE O/P EST LOW 20 MIN: CPT | Mod: PBBFAC,PO | Performed by: INTERNAL MEDICINE

## 2017-03-10 PROCEDURE — 99213 OFFICE O/P EST LOW 20 MIN: CPT | Mod: S$PBB,,, | Performed by: INTERNAL MEDICINE

## 2017-03-10 PROCEDURE — 99999 PR PBB SHADOW E&M-EST. PATIENT-LVL III: CPT | Mod: PBBFAC,,, | Performed by: INTERNAL MEDICINE

## 2017-03-10 NOTE — PROGRESS NOTES
"HPI:  Patient is a 88-year-old man who comes in today to discuss his recent problems with urination.  He's been having problems with urinary retention.  He's been seen by the urologist.  He is currently doing in and out catheterizations as needed.  He really is not good enough shape to have consideration of surgery.  He's having difficulty doing the and out of caths.  Explained to him there are other options such as a permanent indwelling Salazar or even a suprapubic catheter.  I would only recommend doing those.  If he is truly not able to do the in and out catheters.  He needs to discuss these options with the urologist.    Current meds have been verified and updated per the EMR  Exam:BP (!) 152/80  Pulse 77  Temp 98.5 °F (36.9 °C) (Tympanic)   Ht 5' 6" (1.676 m)  Wt 74.8 kg (164 lb 14.5 oz)  SpO2 98%  BMI 26.62 kg/m2  Exam deferred    Lab Results   Component Value Date    WBC 9.60 02/20/2017    HGB 15.3 02/20/2017    HCT 45.3 02/20/2017     02/20/2017    CHOL 174 02/20/2017    TRIG 129 02/20/2017    HDL 59 02/20/2017    ALT 13 02/20/2017    AST 16 02/20/2017     02/20/2017    K 3.1 (L) 02/20/2017    CL 95 02/20/2017    CREATININE 0.8 02/20/2017    BUN 9 02/20/2017    CO2 32 (H) 02/20/2017    TSH 3.061 07/26/2016    PSA 3.8 08/15/2011    INR 1.0 12/08/2015    HGBA1C 6.1 08/10/2016       Impression:  Urinary retention secondary to BPH  Patient Active Problem List   Diagnosis    Impaired fasting glucose    Essential hypertension    Esophageal reflux    Hypertrophy of prostate without urinary obstruction and other lower urinary tract symptoms (LUTS)    History of colonic polyps    Lumbar radiculopathy    Age-related macular degeneration, wet, right eye    CAD in native artery    Mixed hyperlipidemia    Pulmonary hypertension    TR (tricuspid regurgitation)    Multifocal atrial tachycardia    COPD, severe    Gross hematuria    Exercise hypoxemia    Hypoxemia requiring supplemental " oxygen    Anxiety    History of bladder cancer    History of colon cancer       Plan:     He will discuss the above with his urologist.  He'll see me at his regular scheduled appointment.

## 2017-03-13 ENCOUNTER — TELEPHONE (OUTPATIENT)
Dept: UROLOGY | Facility: CLINIC | Age: 82
End: 2017-03-13

## 2017-03-13 NOTE — TELEPHONE ENCOUNTER
----- Message from Leyda Quinn sent at 3/13/2017  8:21 AM CDT -----  Contact: pt  Patient needs a refill on 6in cathter , please call pt @ [phone] regarding order.nt at

## 2017-03-13 NOTE — TELEPHONE ENCOUNTER
Patient requesting order for 6 in 14 fr coude catheters for self catheterization. Informed patient that 6 in catheters are much too short and will not reach into his bladder to drain properly. Patient states understanding.

## 2017-03-13 NOTE — TELEPHONE ENCOUNTER
"Spoke with YARED CHAN from [facility] regarding status of pts supplies; stated they have made numerous attempts to contact pt, including voice mails but pt never responded.  Coworker, Marcella spoke to pt as well and was told that he only wanted 7" catheters and if he couldn't get those, he was not interested in getting catheters.  FJ informed and stated that he would try again to reach out to pt.  "

## 2017-03-29 ENCOUNTER — PROCEDURE VISIT (OUTPATIENT)
Dept: OPHTHALMOLOGY | Facility: CLINIC | Age: 82
End: 2017-03-29
Payer: MEDICARE

## 2017-03-29 DIAGNOSIS — H35.3210 AGE-RELATED MACULAR DEGENERATION, WET, RIGHT EYE: Primary | ICD-10-CM

## 2017-03-29 PROCEDURE — 92012 INTRM OPH EXAM EST PATIENT: CPT | Mod: S$PBB,,, | Performed by: OPHTHALMOLOGY

## 2017-03-29 PROCEDURE — 92134 CPTRZ OPH DX IMG PST SGM RTA: CPT | Mod: PBBFAC,PO | Performed by: OPHTHALMOLOGY

## 2017-03-29 NOTE — MR AVS SNAPSHOT
[facility]  [address] [place] [address]  Phone: [phone]  Fax: [phone]                  Kelton Mcdaniels   3/29/2017 2:15 PM   Procedure visit    Description:  Male : 12/3/1928   Provider:  CRESCENCIO Phillips MD   Department:  [facility]           Reason for Visit     SRN           Diagnoses this Visit        Comments    Age-related macular degeneration, wet, right eye    -  Primary            To Do List           Future Appointments        Provider Department Dept Phone    2017 9:00 AM [facility] XR1-DR [facility] [phone]    2017 9:20 AM PULMONARY LAB, [facility] - Pulm Function [facility] [phone]    2017 9:40 AM MD [facility] Services [phone]    2017 8:40 AM LABORATORY, [facility] - Laboratory [phone]    2017 8:40 AM Sunny Plummer MD [facility] Internal Medicine [phone]      Goals (5 Years of Data)     None      Follow-Up and Disposition     Return in about 5 weeks (around 5/3/2017).      [facility]     [facility] -  Assistance  Registered nurses in the [facility] provide clinical advisement, health education, appointment booking, and other advisory services.  Call for this free service at [phone].             Medications           Message regarding Medications     Verify the changes and/or additions to your medication regime listed below are the same as discussed with your clinician today.  If any of these changes or additions are incorrect, please notify your healthcare provider.             Verify that the below list of medications is an accurate representation of the medications you are currently taking.  If none reported, the list may be blank. If incorrect, please contact your healthcare provider. Carry this list with you in case of emergency.           Current Medications     albuterol (PROAIR HFA) 90 mcg/actuation  inhaler Inhale 2 puffs into the lungs every 4 (four) hours as needed.    albuterol-ipratropium 2.5mg-0.5mg/3mL (DUO-NEB) 0.5 mg-3 mg(2.5 mg base)/3 mL nebulizer solution Take 3 mLs by nebulization every 6 (six) hours while awake. DX: J44.9    carvedilol (COREG) 25 MG tablet TAKE ONE TABLET BY MOUTH TWICE DAILY    doxycycline (MONODOX) 100 MG capsule Take 1 capsule (100 mg total) by mouth 2 (two) times daily.    finasteride (PROSCAR) 5 mg tablet Take 1 tablet (5 mg total) by mouth once daily.    fluticasone-salmeterol 250-50 mcg/dose (ADVAIR DISKUS) 250-50 mcg/dose diskus inhaler INHALE ONE DOSE BY MOUTH TWICE DAILY    hydrochlorothiazide (HYDRODIURIL) 25 MG tablet Take 1 tablet (25 mg total) by mouth once daily.    mucus clearing device (ACAPELLA) Sariah 1 Device by Misc.(Non-Drug; Combo Route) route every 4 to 6 hours as needed.    omeprazole (PRILOSEC) 20 MG capsule Take 1 capsule (20 mg total) by mouth once daily.    roflumilast (DALIRESP) 500 mcg Tab Take 1 tablet (500 mcg total) by mouth once daily.    simvastatin (ZOCOR) 40 MG tablet Take 1 tablet (40 mg total) by mouth every evening.    tamsulosin (FLOMAX) 0.4 mg Cp24 Take 1 capsule (0.4 mg total) by mouth once daily.    tiotropium bromide 2.5 mcg/actuation Mist Inhale 5 mcg into the lungs once daily.    alprazolam (XANAX) 0.25 MG tablet Take 1 tablet (0.25 mg total) by mouth daily as needed for Anxiety. For panic attacks    butenafine 1 % cream Apply topically 2 (two) times daily.    gabapentin (NEURONTIN) 100 MG capsule Take 1 capsule (100 mg total) by mouth every evening.    nitroGLYCERIN (NITROSTAT) 0.4 MG SL tablet Place 1 tablet (0.4 mg total) under the tongue every 5 (five) minutes as needed for Chest pain.           Clinical Reference Information           Allergies as of 3/29/2017     No Known Allergies      Immunizations Administered on Date of Encounter - 3/29/2017     None      Orders Placed During Today's Visit      Normal Orders This Visit     Posterior Segment OCT Retina-Both eyes       Language Assistance Services     ATTENTION: Language assistance services are available, free of charge. Please call [phone].      ATENCIÓN: Si habla patrica, tiene a segal disposición servicios gratuitos de asistencia lingüística. Llame al [phone].     CHÚ Ý: N?u b?n nói Ti?ng Vi?t, có các d?ch v? h? tr? ngôn ng? mi?n phí dành cho b?n. G?i s? [phone].         [facility] complies with applicable Federal civil rights laws and does not discriminate on the basis of race, color, national origin, age, disability, or sex.

## 2017-03-29 NOTE — PROGRESS NOTES
===============================  Kelton CAMARILLO Mcdaniels,   88 y.o. male   Last visit JC: :3/1/2017   Last visit eye dept. 3/1/2017  [facility]:  Corrected distance visual acuity was CF at 3' in the right eye and 20/25 in the left eye.  Tonometry     Tonometry (Applanation, 2:15 PM)      Right Left   Pressure 15 15                  Not recorded         Not recorded        Chief Complaint   Patient presents with    SRN     EVAL EYLEA OD        HPI     SRN    Additional comments: EVAL EYLEA OD           Comments   --Smd/srn od dx. 10/5/15   --2+ vac cat ou    EYLEA OD 1/20/17  H/o Avastin       Last edited by CRESCENCIO Phillips MD on 3/29/2017  1:42 PM. (History)      Read Studies: y  Vitalsy    ________________  3/29/2017  Problem List Items Addressed This Visit        Eye/Vision problems    Age-related macular degeneration, wet, right eye - Primary    Overview     Diagnosed 10/5/15 symptomatic 4 months at that time.  Treated with Avastin then Eylea         Relevant Orders    Posterior Segment OCT Retina-Both eyes (Completed)        .  od srn   trila of oasys  Oct better - no tx last mo   Treat if symptoicalyworse   rtc  5 weeks      ===========================

## 2017-03-30 ENCOUNTER — TELEPHONE (OUTPATIENT)
Dept: INTERNAL MEDICINE | Facility: CLINIC | Age: 82
End: 2017-03-30

## 2017-03-30 NOTE — TELEPHONE ENCOUNTER
Called pt.  He reports that he has found his potassium prescription.  Informed pt that MD wants him to take potassium not calcium.  Pt voiced understanding and read back name of medication on bottle.

## 2017-03-30 NOTE — TELEPHONE ENCOUNTER
In my note 2/27/17 I told him needed to take potassium pills (not calcium) and even sent it to his pharmacy. Not sure why it was taken out of the medcard. Ask pt if he has a prescription for potassium and if not resend the prescription to me to be refilled again. See my note on 2/27/17 where it was ordered.

## 2017-03-30 NOTE — TELEPHONE ENCOUNTER
----- Message from Kory Hernadez sent at 3/30/2017 12:04 PM CDT -----  Contact: [phone]   Pt would like to consult with the nurse about Rx Medication, pt would like refills callied in on Atorvastiatin 40 mg.  Please call back@ [phone].  Thanks-[facility]          Pt uses:    [facility] in [place]

## 2017-03-30 NOTE — TELEPHONE ENCOUNTER
"S/w pt. Pt stated, " Well Dr. Plummer told me that I need to be taking Calcium. I thought he was sending it into the pharmacy.". I advised that I don't see any medications for Calcium in medcard but would clarify with Dr. Plummer. LV 03/10/17. NV 08/29/17/TAISHA  "

## 2017-05-01 ENCOUNTER — OFFICE VISIT (OUTPATIENT)
Dept: INTERNAL MEDICINE | Facility: CLINIC | Age: 82
End: 2017-05-01
Payer: MEDICARE

## 2017-05-01 VITALS
DIASTOLIC BLOOD PRESSURE: 74 MMHG | HEART RATE: 84 BPM | OXYGEN SATURATION: 96 % | HEIGHT: 66 IN | SYSTOLIC BLOOD PRESSURE: 132 MMHG | TEMPERATURE: 99 F | WEIGHT: 171.94 LBS | BODY MASS INDEX: 27.63 KG/M2

## 2017-05-01 DIAGNOSIS — M25.511 ACUTE PAIN OF RIGHT SHOULDER: ICD-10-CM

## 2017-05-01 DIAGNOSIS — I10 ESSENTIAL HYPERTENSION: Primary | ICD-10-CM

## 2017-05-01 PROCEDURE — 99213 OFFICE O/P EST LOW 20 MIN: CPT | Mod: S$PBB,,, | Performed by: INTERNAL MEDICINE

## 2017-05-01 PROCEDURE — 99214 OFFICE O/P EST MOD 30 MIN: CPT | Mod: PBBFAC,PO | Performed by: INTERNAL MEDICINE

## 2017-05-01 PROCEDURE — 99999 PR PBB SHADOW E&M-EST. PATIENT-LVL IV: CPT | Mod: PBBFAC,,, | Performed by: INTERNAL MEDICINE

## 2017-05-01 NOTE — PROGRESS NOTES
"HPI:  Patient is a 88-year-old man who comes in today with complaints of pain in his right shoulder from a fall that happened 3 weeks ago.  He states that he fell directly on that shoulder itself.  It's still quite sore and tender and he has limited range of motion in the shoulder.    Current meds have been verified and updated per the EMR  Exam:/74  Pulse 84  Temp 98.8 °F (37.1 °C) (Tympanic)   Ht 5' 6" (1.676 m)  Wt 78 kg (171 lb 15.3 oz)  SpO2 96%  BMI 27.75 kg/m2  He has decreased abduction and decreased external rotation.  There is no palpable abnormality.  Movement of the joint does not reproduce pain unless taken to extreme range of motion.  There is no swelling, no effusion, no ecchymosis.    Lab Results   Component Value Date    WBC 9.60 02/20/2017    HGB 15.3 02/20/2017    HCT 45.3 02/20/2017     02/20/2017    CHOL 174 02/20/2017    TRIG 129 02/20/2017    HDL 59 02/20/2017    ALT 13 02/20/2017    AST 16 02/20/2017     02/20/2017    K 3.1 (L) 02/20/2017    CL 95 02/20/2017    CREATININE 0.8 02/20/2017    BUN 9 02/20/2017    CO2 32 (H) 02/20/2017    TSH 3.061 07/26/2016    PSA 3.8 08/15/2011    INR 1.0 12/08/2015    HGBA1C 6.1 08/10/2016       Impression:  Right shoulder injury secondary to fall.  I do not think his broken anything.  I suspect he's got a rotator cuff injury.  Due to his advanced age and severe end-stage emphysema  I would not recommend any surgery.  Explained that most times.  His injuries improved within 6-8 weeks.  I would ask him to be very patient.  Patient Active Problem List   Diagnosis    Impaired fasting glucose    Essential hypertension    Esophageal reflux    Hypertrophy of prostate without urinary obstruction and other lower urinary tract symptoms (LUTS)    History of colonic polyps    Lumbar radiculopathy    Age-related macular degeneration, wet, right eye    CAD in native artery    Mixed hyperlipidemia    Pulmonary hypertension    TR " (tricuspid regurgitation)    Multifocal atrial tachycardia    COPD, severe    Gross hematuria    Exercise hypoxemia    Hypoxemia requiring supplemental oxygen    Anxiety    History of bladder cancer    History of colon cancer       Plan:     He will call me in one month If his shoulder is not improved.

## 2017-05-03 ENCOUNTER — PROCEDURE VISIT (OUTPATIENT)
Dept: OPHTHALMOLOGY | Facility: CLINIC | Age: 82
End: 2017-05-03
Payer: MEDICARE

## 2017-05-03 DIAGNOSIS — H35.3210 AGE-RELATED MACULAR DEGENERATION, WET, RIGHT EYE: Primary | ICD-10-CM

## 2017-05-03 PROCEDURE — 92012 INTRM OPH EXAM EST PATIENT: CPT | Mod: S$PBB,,, | Performed by: OPHTHALMOLOGY

## 2017-05-03 PROCEDURE — 92134 CPTRZ OPH DX IMG PST SGM RTA: CPT | Mod: PBBFAC,PO | Performed by: OPHTHALMOLOGY

## 2017-05-03 NOTE — MR AVS SNAPSHOT
[facility] - Ophthalmology  [address] [facility] [place] [address]  Phone: [phone]  Fax: [phone]                  Kelton Mcdaniels   5/3/2017 10:45 AM   Procedure visit    Description:  Male : 12/3/1928   Provider:  CRESCENCIO Phillips MD   Department:  [facility] - Ophthalmology           Reason for Visit     SRN           Diagnoses this Visit        Comments    Age-related macular degeneration, wet, right eye    -  Primary            To Do List           Future Appointments        Provider Department Dept Phone    2017 9:00 AM ONLH XR1-DR [facility] [phone]    2017 9:20 AM PULMONARY LAB, [facility] - Pulm Function [facility] [phone]    2017 9:40 AM MD [facility] - Pulmonary Services [phone]    2017 8:40 AM LABORATORY, [facility] - Laboratory [phone]    2017 8:40 AM Sunny Plummer MD [facility] Internal Medicine [phone]      Goals (5 Years of Data)     None      Follow-Up and Disposition     Return in about 7 weeks (around 2017).      [facility]     [facility] -  Assistance  Unless otherwise directed by your provider, please contact [facility], our nurse care line that is available for  assistance.     Registered nurses in the [facility] provide: appointment scheduling, clinical advisement, health education, and other advisory services.  Call: [phone] (toll free)               Medications           Message regarding Medications     Verify the changes and/or additions to your medication regime listed below are the same as discussed with your clinician today.  If any of these changes or additions are incorrect, please notify your healthcare provider.             Verify that the below list of medications is an accurate representation of the medications you are currently taking.  If none reported, the list may be blank. If incorrect, please contact your healthcare  provider. Carry this list with you in case of emergency.           Current Medications     albuterol (PROAIR HFA) 90 mcg/actuation inhaler Inhale 2 puffs into the lungs every 4 (four) hours as needed.    albuterol-ipratropium 2.5mg-0.5mg/3mL (DUO-NEB) 0.5 mg-3 mg(2.5 mg base)/3 mL nebulizer solution Take 3 mLs by nebulization every 6 (six) hours while awake. DX: J44.9    carvedilol (COREG) 25 MG tablet TAKE ONE TABLET BY MOUTH TWICE DAILY    doxycycline (MONODOX) 100 MG capsule Take 1 capsule (100 mg total) by mouth 2 (two) times daily.    finasteride (PROSCAR) 5 mg tablet Take 1 tablet (5 mg total) by mouth once daily.    fluticasone-salmeterol 250-50 mcg/dose (ADVAIR DISKUS) 250-50 mcg/dose diskus inhaler INHALE ONE DOSE BY MOUTH TWICE DAILY    hydrochlorothiazide (HYDRODIURIL) 25 MG tablet Take 1 tablet (25 mg total) by mouth once daily.    mucus clearing device (ACAPELLA) Sariah 1 Device by Misc.(Non-Drug; Combo Route) route every 4 to 6 hours as needed.    omeprazole (PRILOSEC) 20 MG capsule Take 1 capsule (20 mg total) by mouth once daily.    roflumilast (DALIRESP) 500 mcg Tab Take 1 tablet (500 mcg total) by mouth once daily.    simvastatin (ZOCOR) 40 MG tablet Take 1 tablet (40 mg total) by mouth every evening.    tiotropium bromide 2.5 mcg/actuation Mist Inhale 5 mcg into the lungs once daily.    alprazolam (XANAX) 0.25 MG tablet Take 1 tablet (0.25 mg total) by mouth daily as needed for Anxiety. For panic attacks    butenafine 1 % cream Apply topically 2 (two) times daily.    gabapentin (NEURONTIN) 100 MG capsule Take 1 capsule (100 mg total) by mouth every evening.    nitroGLYCERIN (NITROSTAT) 0.4 MG SL tablet Place 1 tablet (0.4 mg total) under the tongue every 5 (five) minutes as needed for Chest pain.    tamsulosin (FLOMAX) 0.4 mg Cp24 Take 1 capsule (0.4 mg total) by mouth once daily.           Clinical Reference Information           Allergies as of 5/3/2017     No Known Allergies      Immunizations  Administered on Date of Encounter - 5/3/2017     None      Orders Placed During Today's Visit      Normal Orders This Visit    Posterior Segment OCT Retina-Both eyes       Language Assistance Services     ATTENTION: Language assistance services are available, free of charge. Please call [phone].      ATENCIÓN: Si davela patrica, tiene a segal disposición servicios gratuitos de asistencia lingüística. Llame al [phone].     CHÚ Ý: N?u b?n nói Ti?ng Vi?t, có các d?ch v? h? tr? ngôn ng? mi?n phí dành cho b?n. G?i s? [phone].         [facility] complies with applicable Federal civil rights laws and does not discriminate on the basis of race, color, national origin, age, disability, or sex.

## 2017-05-03 NOTE — PROGRESS NOTES
===============================  05/03/2017   Kelton Mcdaniels,   88 y.o. male   Last visit [facility]: :3/29/2017   Last visit eye dept. 3/29/2017  [facility]:  Corrected distance visual acuity was 20/200 in the right eye and 20/40 in the left eye.  Tonometry     Tonometry (Applanation, 11:27 AM)      Right Left   Pressure 14 14                  Not recorded         Not recorded        Chief Complaint   Patient presents with    SRN     EVAL FOR EYLEA        HPI     SRN    Additional comments: EVAL FOR EYLEA           Comments   --Smd/srn od dx. 10/5/15   --2+ vac cat ou      EYLEA OD 1/20/17  H/o Avastin       Last edited by Megan Leong on 5/3/2017 10:16 AM. (History)      Read Studies: y  Vitalsy  ________________  5/3/2017  Problem List Items Addressed This Visit        Eye/Vision problems    Age-related macular degeneration, wet, right eye - Primary    Overview     Diagnosed 10/5/15 symptomatic 4 months at that time.  Treated with Avastin then Eylea         Relevant Orders    Posterior Segment OCT Retina-Both eyes (Completed)        .  .  Oct stable od  Treat only if symtoms worsn  O soct good   rtc 7 weeks  Call 24/7 for any worsening of vision. Check OU QD. Gave my home phone number.       ===========================

## 2017-06-19 ENCOUNTER — OFFICE VISIT (OUTPATIENT)
Dept: PHYSICAL MEDICINE AND REHAB | Facility: CLINIC | Age: 82
End: 2017-06-19
Payer: MEDICARE

## 2017-06-19 ENCOUNTER — HOSPITAL ENCOUNTER (OUTPATIENT)
Dept: RADIOLOGY | Facility: HOSPITAL | Age: 82
Discharge: HOME | End: 2017-06-19
Attending: PHYSICAL MEDICINE & REHABILITATION
Payer: MEDICARE

## 2017-06-19 VITALS
HEIGHT: 66 IN | BODY MASS INDEX: 27.48 KG/M2 | SYSTOLIC BLOOD PRESSURE: 157 MMHG | RESPIRATION RATE: 14 BRPM | DIASTOLIC BLOOD PRESSURE: 92 MMHG | HEART RATE: 86 BPM | WEIGHT: 171 LBS

## 2017-06-19 DIAGNOSIS — M25.511 RIGHT SHOULDER PAIN, UNSPECIFIED CHRONICITY: ICD-10-CM

## 2017-06-19 DIAGNOSIS — M25.511 RIGHT SHOULDER PAIN, UNSPECIFIED CHRONICITY: Primary | ICD-10-CM

## 2017-06-19 DIAGNOSIS — S46.001D ROTATOR CUFF INJURY, RIGHT, SUBSEQUENT ENCOUNTER: ICD-10-CM

## 2017-06-19 PROCEDURE — 99999 PR PBB SHADOW E&M-EST. PATIENT-LVL III: CPT | Mod: PBBFAC,,, | Performed by: PHYSICAL MEDICINE & REHABILITATION

## 2017-06-19 PROCEDURE — 1125F AMNT PAIN NOTED PAIN PRSNT: CPT | Mod: ,,, | Performed by: PHYSICAL MEDICINE & REHABILITATION

## 2017-06-19 PROCEDURE — 1159F MED LIST DOCD IN RCRD: CPT | Mod: ,,, | Performed by: PHYSICAL MEDICINE & REHABILITATION

## 2017-06-19 PROCEDURE — 73030 X-RAY EXAM OF SHOULDER: CPT | Mod: 26,RT,, | Performed by: RADIOLOGY

## 2017-06-19 PROCEDURE — 99204 OFFICE O/P NEW MOD 45 MIN: CPT | Mod: S$PBB,,, | Performed by: PHYSICAL MEDICINE & REHABILITATION

## 2017-06-19 PROCEDURE — 73030 X-RAY EXAM OF SHOULDER: CPT | Mod: TC,PO,RT

## 2017-06-19 NOTE — PROGRESS NOTES
PM&R NEW PATIENT HISTORY & PHYSICAL :    Referring Physician: Kavitha    Chief Complaint   Patient presents with    Shoulder Pain     right, post fall in may       HPI: This is a 88 y.o.  male being seen in clinic today for evaluation of a 2 month history of right shoulder pain that has persisted after falling.  He has limited ROM and still has achy pain in the surrounding musculature and shoulder joint.  Standing under a hot shower and tylenol have provided some relief.  At times he has pain shooting into his right 5th digit.     History obtained from patient    Functional History:  Walking: Not limited  Transfers: Independent  Assistive devices: No  Power mobility: No  Falls: None       Needs help with:  Nothing - all ADLS normal    Review of Systems:     General- denies lethargy, weight change, fever, chills  Head/neck- denies swallowing difficulties  ENT- +hearing changes  Cardiovascular-denies chest pain  Pulmonary- +shortness of breath- COPD on O2  GI- denies constipation or bowel incontinence  - denies bladder incontinence  Skin- denies wounds or rashes  Musculoskeletal- +weakness, +pain  Neurologic- +/-numbness and tingling  Psychiatric- denies depressive or psychotic features, denies anxiety  Lymphatic-denies swelling  Endocrine- denies hypoglycemic symptoms/DM history    Physical Examination:  General: Well developed, well nourished male, NAD    Spinal Examination: CERVICAL  Active ROM is within normal limits.  Inspection: No deformity of spinal alignment. Large fluctuant mass near ant neck and back-lipoma  Palpation: No vertebral tenderness to percussion. ttp at right trapezius, mild ttp at ac jt  Spurling test: neg    Spinal Examination: LUMBAR or THORACIC  Active ROM is within normal limits.  Inspection: No deformity of spinal alignment.    Musculoskeletal Tests:  Phalen:    Elbow compression (ulnar): neg  Tinel at wrist: neg  Scratch-Collapse Test:     Bilateral Upper and Lower Extremities:  Pulses  are 2+ at radial, bilaterally.  Shoulder/Elbow/Wrist/Hand ROM wnl except limited at full ROM at right shoulder in abduction, ER, IR  Hip/Knee/Ankle ROM   Bilateral Extremities show normal capillary refill.  No signs of cyanosis, rubor, edema, skin changes, or dysvascular changes of appendages.  Nails appear intact.    Neurological Exam:  Cranial Nerves:  II-XII grossly intact    Manual Muscle Testing: (Motor 5=normal)    RIGHT Upper extremity: Shoulder abduction 4p/5, Biceps 5/5, Triceps 5/5, Wrist extension 5/5, Abductor pollicis brevis 5/5, Ulnar hand intrinsics 5/5,  LEFT Upper extremity: Shoulder abduction 5/5, Biceps 5/5, Triceps 5/5, Wrist extension 5/5, Abductor pollicis brevis 5/5, Ulnar hand intrinsics 5/5,  No focal atrophy is noted of either upper or lower extremity.    Bilateral Reflexes:1+bic tri br  Lepe's response is absent bilaterally.      Sensation: tested to light touch  - intact in arms  Gait: Narrow base and good arm swing.    Cerebellar:  tandem gait.     IMPRESSION/PLAN: This is a 88 y.o.  male with right shoulder pain-probable rotator cuff tear    1. Rx for PT-Lewy--Cuff strengthening, ROM, dec pain, myofascial release, modalities  2. Xray today to r/o dislocation or fracture  3. Cont tylenol prn, ice/heat modalities  4. Will contact with xray results    Sudha May M.D.  Physical Medicine and Rehab

## 2017-06-19 NOTE — PATIENT INSTRUCTIONS
Shoulder and Upper Back Stretch  To start, stand tall with your ears, shoulders, and hips in line. Your feet should be slightly apart, positioned just under your hips. Focus your eyes directly in front of you.  this position for a few seconds before starting your exercise. This helps increase your awareness of proper posture.  Reach overhead and slightly back with both arms. Keep your shoulders and neck aligned and your elbows behind your shoulders:  · With your palms facing the ceiling, turn your fingers inward.  · Take a deep breath. Breathe out, and lower your elbows toward your buttocks. Hold for 5 seconds, then return to starting position.  · Repeat 3 times.       Date Last Reviewed: 8/16/2015  © 9260-3715 [employer]. [address] [place] [address]. All rights reserved. This information is not intended as a substitute for professional medical care. Always follow your healthcare professional's instructions.        Shoulder Clock Exercise    To start, stand tall with your ears, shoulders, and hips in line. Your feet should be slightly apart, positioned just under your hips. Focus your eyes directly in front of you.  this position for a few seconds before starting your exercise. This helps increase your awareness of proper posture.  · Imagine that your right shoulder is the center of a clock. With the outer point of your shoulder, roll it around to slowly trace the outer edge of the clock.  · Move clockwise first, then counterclockwise.  · Repeat 3 to 5 times. Switch shoulders.   Date Last Reviewed: 10/2/2015  © 8375-3073 [employer]. [address] [place] [address]. All rights reserved. This information is not intended as a substitute for professional medical care. Always follow your healthcare professional's instructions.        Shoulder Girdle Stretch      To start, sit in a chair with your feet flat on the floor. Your weight should be slightly  forward so that youre balanced evenly on your buttocks. Relax your shoulders and keep your head level. Using a chair with arms may help you keep your balance:  · Place 1 hand on the outside elbow of the other arm.  · Pull the arm across your body. Hold for 30 to 60 seconds. Repeat once.  · Switch sides.  For your safety, check with your healthcare provider before starting an exercise program.   Date Last Reviewed: 8/16/2015  © 3930-9258 [employer]. [address] [place] [address]. All rights reserved. This information is not intended as a substitute for professional medical care. Always follow your healthcare professional's instructions.        Shoulder Exercises      To start, sit in a chair with your feet flat on the floor. Your weight should be slightly forward so that youre balanced evenly on your buttocks. Relax your shoulders and keep your head level. Avoid arching your back or rounding your shoulders. Using a chair with arms may help you keep your balance.  · Raise your arms, elbows bent, to shoulder height.  · Slowly move your forearms together. Hold for 5 seconds.  · Return to starting position. Repeat 5 times.  Date Last Reviewed: 10/1/2015  © 9453-2621 [employer]. [address] [place] [address]. All rights reserved. This information is not intended as a substitute for professional medical care. Always follow your healthcare professional's instructions.        Shoulder Shrug Exercise  To start, sit in a chair with your feet flat on the floor. Shift your weight slightly forward to avoid rounding your back. Relax. Keep your ears, shoulders, and hips aligned:  · Raise both of your shoulders as high as you can, as if you were trying to touch them to your ears. Keep your head and neck still and relaxed.  · Hold for a count of 10. Release.  · Repeat 5 times.  For your safety, check with your healthcare provider before starting an exercise program.   Date Last  Reviewed: 8/16/2015 © 2000-2016 "[employer] [address] [place] [address]. All rights reserved. This information is not intended as a substitute for professional medical care. Always follow your healthcare professional's instructions.        Shoulder Squeeze Exercise      To start, sit in a chair with your feet flat on the floor. Shift your weight slightly forward to avoid rounding your back. Relax. Keep your ears, shoulders, and hips aligned:  · Raise your arms to shoulder height, elbows bent and palms forward.  · Move your arms back, squeezing your shoulder blades together.  · Hold for 10 seconds. Return to starting position.   · Repeat 5 times.   For your safety, check with your healthcare provider before starting an exercise program.   Date Last Reviewed: 8/16/2015 © 2000-2016 "[employer] [address] [place] [address]. All rights reserved. This information is not intended as a substitute for professional medical care. Always follow your healthcare professional's instructions.        Shoulder Exercises: Internal Rotation    Strengthening exercises help make your injured shoulder more stable. To warm up, do flexibility (stretching) exercises first. Your healthcare provider will tell you what size hand weights to use for the strengthening exercise below. If you dont have hand weights, try using cans of soup instead:  · With knees bent, lie on a firm surface. Using the hand on the same side as your injured shoulder, grasp a weight. Bend that arm to a right angle (90 degrees).  · Rest your elbow on the floor.  · Keeping your elbow next to your side, lower your forearm toward the floor, away from your body. Do not lower your hand all the way to the floor.  · Slowly return your forearm to your side. Repeat.  · Work up to 5 to 15 lifts.     Note: Support your head and neck with a pillow.   Date Last Reviewed: 9/30/2015  © 2109-6469 [employer] [address]. All rights reserved. This information is not intended as a substitute for professional medical care. Always follow your healthcare professional's instructions.        Shoulder Exercises: External Rotation  Strengthening exercises help make your injured shoulder more stable. To warm up, do flexibility (stretching) exercises first. Your healthcare provider will tell you what size hand weights to use for the strengthening exercise below. If you dont have hand weights, try using cans of soup instead:  · Lie on your uninjured side with your head supported by a pillow or your arm. Place a small rolled-up towel under your top elbow.  · Grasp a hand weight with your top hand and bend that arm to a right angle, resting your forearm against your stomach.  · Keeping your elbow against the towel, slowly lift the weight until your forearm is slightly higher than your elbow. Return to the starting position. Repeat.  · Work up to 5 to 15 lifts.  Date Last Reviewed: 8/16/2015  © 0253-8767 [employer]. [address] [place] [address]. All rights reserved. This information is not intended as a substitute for professional medical care. Always follow your healthcare professional's instructions.        Shoulder Exercises: Shoulder Press    This exercise stretches and strengthens your shoulders. Before starting, read through all the instructions. During the exercise, breathe normally and use smooth movements. Stop if you feel any pain. If pain persists, call your healthcare provider.  · Hold a ____ pound weight in each hand, elbows at shoulder level, palms facing forward. Arms to the side and slightly forward.   · Raise one arm up until its almost straight. Hold for a second. Lower the weight, extending the other arm up.  · Repeat ____ times with each arm. Do ____ sets ____ times a day.  CAUTION: If you have shoulder problems, consult your health care provider before doing  this exercise. Keep your head and body still during the exercise. Only your arms should move.   Date Last Reviewed: 8/16/2015 © 2000-2016 [employer]. [address] [place] [address]. All rights reserved. This information is not intended as a substitute for professional medical care. Always follow your healthcare professional's instructions.        Shoulder Exercises: Side Raise    This exercise stretches and strengthens your shoulders. Before starting, read through all the instructions. During the exercise, breathe normally and use smooth movements. Stop if you feel any pain. If pain persists, call your healthcare provider.  · Stand straight, holding a ____ pound weight in each hand, arms at sides, feet shoulder-width apart.  · Slowly extend your arms up and out until weights are at shoulder level. Slowly return to starting position.  · Repeat ____ times. Do ____ sets ____ times a day.     CAUTION: Dont swing the weights or raise weights above shoulder level.   Date Last Reviewed: 8/16/2015 © 2000-2016 [employer]. [address] [place] [address]. All rights reserved. This information is not intended as a substitute for professional medical care. Always follow your healthcare professional's instructions.        Shoulder Exercises: Triceps Press    This exercise stretches and strengthens your shoulders. Before starting, read through all the instructions. During the exercise, breathe normally and use smooth movements. Stop if you feel any pain. If pain persists, call your healthcare provider.  · Grasp a ___ pound  weight in each hand. Raise one arm overhead. Hold that arm close to your ear. Bend your elbow and lower the weight behind your head, as far as you can, being careful not to hit your head.   · Slowly straighten your elbow, extending your arm upward. Return to starting position.  · Repeat ____ times with each arm. Do ____ sets ____ times a day.  CAUTION: Keep  your head still and neck straight. Keep your arm close to your ear. Dont arch your back.   Date Last Reviewed: 8/16/2015  © 2144-6217 [employer]. [address] [place] [address]. All rights reserved. This information is not intended as a substitute for professional medical care. Always follow your healthcare professional's instructions.        Shoulder Exercises: Wall Pushup    Strengthening exercises help make your injured shoulder more stable by making the muscles that support your shoulder stronger. To warm up, do flexibility (stretching) exercises first.  · With feet and hands shoulder-width apart, place your palms on the wall, standing about an arms length away.  · Keeping your knees straight and heels on the floor, bend your elbows and lean forward as far as you comfortably can. Your elbows should be pointing downward. Then push away from the wall to the starting position. Repeat.  · Work up to 15 wall pushups.     Note: Wear shoes that keep you from slipping.   Date Last Reviewed: 9/3/2015  © 3418-5057 [employer]. [address] [place] [address]. All rights reserved. This information is not intended as a substitute for professional medical care. Always follow your healthcare professional's instructions.        Pendulum (Flexibility)    1. Lean over next to a table, with your left arm supporting your weight on the table.  2. Relax your right arm and let it hang straight down.  3. Slowly begin to swing your right arm in a small [place]. Gradually make the [place] bigger if you can. Change direction after 1 minute of motion.  4. Next, swing your right arm backward and forward. Then move it side to side. Change direction after 1 minute of motion.  5. Repeat these movements for about 5 minutes.  6. Do this exercise 3 times a day, or as often as instructed.  Date Last Reviewed: 3/10/2016  © 8873-0735 [employer]. [address] [place] [address]  [address]. All rights reserved. This information is not intended as a substitute for professional medical care. Always follow your healthcare professional's instructions.

## 2017-07-03 ENCOUNTER — PROCEDURE VISIT (OUTPATIENT)
Dept: OPHTHALMOLOGY | Facility: CLINIC | Age: 82
End: 2017-07-03
Payer: MEDICARE

## 2017-07-03 DIAGNOSIS — H35.3210 AGE-RELATED MACULAR DEGENERATION, WET, RIGHT EYE: Primary | ICD-10-CM

## 2017-07-03 PROCEDURE — 92012 INTRM OPH EXAM EST PATIENT: CPT | Mod: S$PBB,,, | Performed by: OPHTHALMOLOGY

## 2017-07-03 PROCEDURE — 92134 CPTRZ OPH DX IMG PST SGM RTA: CPT | Mod: PBBFAC,PO | Performed by: OPHTHALMOLOGY

## 2017-07-11 DIAGNOSIS — K21.9 GASTROESOPHAGEAL REFLUX DISEASE, ESOPHAGITIS PRESENCE NOT SPECIFIED: ICD-10-CM

## 2017-07-11 RX ORDER — OMEPRAZOLE 20 MG/1
CAPSULE, DELAYED RELEASE ORAL
Qty: 90 CAPSULE | Refills: 3 | Status: SHIPPED | OUTPATIENT
Start: 2017-07-11 | End: 2017-08-23 | Stop reason: SDUPTHER

## 2017-07-12 ENCOUNTER — PROCEDURE VISIT (OUTPATIENT)
Dept: PULMONOLOGY | Facility: CLINIC | Age: 82
End: 2017-07-12
Payer: MEDICARE

## 2017-07-12 ENCOUNTER — OFFICE VISIT (OUTPATIENT)
Dept: PULMONOLOGY | Facility: CLINIC | Age: 82
End: 2017-07-12
Payer: MEDICARE

## 2017-07-12 ENCOUNTER — HOSPITAL ENCOUNTER (OUTPATIENT)
Dept: RADIOLOGY | Facility: HOSPITAL | Age: 82
Discharge: HOME | End: 2017-07-12
Attending: INTERNAL MEDICINE
Payer: MEDICARE

## 2017-07-12 VITALS
WEIGHT: 179 LBS | HEIGHT: 66 IN | HEART RATE: 77 BPM | DIASTOLIC BLOOD PRESSURE: 82 MMHG | OXYGEN SATURATION: 95 % | SYSTOLIC BLOOD PRESSURE: 120 MMHG | BODY MASS INDEX: 28.77 KG/M2 | RESPIRATION RATE: 18 BRPM

## 2017-07-12 DIAGNOSIS — R09.02 EXERCISE HYPOXEMIA: Chronic | ICD-10-CM

## 2017-07-12 DIAGNOSIS — J44.9 COPD, SEVERE: Primary | Chronic | ICD-10-CM

## 2017-07-12 DIAGNOSIS — J44.9 COPD, SEVERE: Chronic | ICD-10-CM

## 2017-07-12 DIAGNOSIS — R09.02 HYPOXEMIA REQUIRING SUPPLEMENTAL OXYGEN: ICD-10-CM

## 2017-07-12 DIAGNOSIS — Z99.81 HYPOXEMIA REQUIRING SUPPLEMENTAL OXYGEN: ICD-10-CM

## 2017-07-12 PROCEDURE — 94010 BREATHING CAPACITY TEST: CPT | Mod: 26,S$PBB,, | Performed by: INTERNAL MEDICINE

## 2017-07-12 PROCEDURE — 1159F MED LIST DOCD IN RCRD: CPT | Mod: ,,, | Performed by: INTERNAL MEDICINE

## 2017-07-12 PROCEDURE — 99214 OFFICE O/P EST MOD 30 MIN: CPT | Mod: 25,S$PBB,, | Performed by: INTERNAL MEDICINE

## 2017-07-12 PROCEDURE — 99999 PR PBB SHADOW E&M-EST. PATIENT-LVL III: CPT | Mod: PBBFAC,,, | Performed by: INTERNAL MEDICINE

## 2017-07-12 PROCEDURE — 71020 XR CHEST PA AND LATERAL: CPT | Mod: 26,,, | Performed by: RADIOLOGY

## 2017-07-12 PROCEDURE — 99213 OFFICE O/P EST LOW 20 MIN: CPT | Mod: PBBFAC,25 | Performed by: INTERNAL MEDICINE

## 2017-07-12 NOTE — ASSESSMENT & PLAN NOTE
COPD ROS: taking medications as instructed, no medication side effects noted, no significant ongoing wheezing or shortness of breath, using bronchodilator MDI less than twice a week.   Mediations: Proari HFA, Tiotropium bromide, Accapella, Daliresp, Advair Diskus  nebuliser DUONEB PRN  CAT score 20.  FEV1 : 0.75 L( 37%), FEV1/FVc 31  mRC score 2  New concerns: None.     Exam: appears well, vitals normal, no respiratory distress, acyanotic, normal RR, chest clear, no wheezing, crepitations, rhonchi, normal symmetric air entry.     Assessment: COPD reasonably well controlled.     Plan: current treatment plan is effective, no change in therapy.

## 2017-07-12 NOTE — PROGRESS NOTES
"Subjective:       Patient ID: Kelton Mcdaniels is a 88 y.o. male.    Chief Complaint: COPD, severe    Mr. 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    HENT: Negative.    Eyes: Negative.    Respiratory: Positive for dyspnea on extertion. Negative for snoring, sputum production and wheezing.    Cardiovascular: Negative.    Genitourinary: Negative.    Endocrine: endocrine negative   Musculoskeletal: Negative.    Skin: Negative.    Gastrointestinal: Negative.    Neurological: Negative.    Hematological: Excessive bruising.   Psychiatric/Behavioral: Negative.        Objective:       Vitals:    07/12/17 0957   BP: 120/82   Pulse: 77   Resp: 18   SpO2: 95%  Comment: pt on 2 liter per minute   Weight: 81.2 kg (179 lb 0.2 oz)   Height: 5' 6" (1.676 m)     Physical Exam   Constitutional: He is oriented to person, place, and time. He appears well-developed and well-nourished.   HENT:   Head: Normocephalic.   Mouth/Throat: Oropharynx is clear and moist.   Neck: Normal range of motion. Neck supple. No tracheal deviation present. No thyromegaly present.   Cardiovascular: Normal rate, regular rhythm and normal heart sounds.    Pulmonary/Chest: Normal expansion, " hyperinflation, symmetric chest wall expansion and effort normal.   Abdominal: Soft. Bowel sounds are normal.   Musculoskeletal: Normal range of motion.   Lymphadenopathy:     He has no cervical adenopathy.   Neurological: He is alert and oriented to person, place, and time. Gait normal.   Using cane   Skin: Skin is warm and dry. No rash noted. Nails show no clubbing.   Psychiatric: He has a normal mood and affect.   Nursing note and vitals reviewed.    Personal Diagnostic Review    Chest x-ray:   Little interval change.  Hyperinflation and prominence interstitial markings again noted.  Bibasilar linear scarring and/or subsegmental atelectasis without consolidation or effusion.  Heart and pulmonary vasculature are stable.  Tortuous aorta and midline trachea.  Postsurgical changes prior sternotomy.  Stable soft tissue calcifications RIGHT supraclavicular region.  Osteopenia and spondylosis.    Pulmonary function tests: FEV1: 0.75  (37 % predicted), FVC:  2.40 (80 % predicted), FEV1/FVC:  31  No flowsheet data found.      Assessment:       Problem List Items Addressed This Visit     COPD, severe - Primary (Chronic)     COPD ROS: taking medications as instructed, no medication side effects noted, no significant ongoing wheezing or shortness of breath, using bronchodilator MDI less than twice a week.   Mediations: Proari HFA, Tiotropium bromide, Accapella, Daliresp, Advair Diskus  nebuliser DUONEB PRN  CAT score 20.  FEV1 : 0.75 L( 37%), FEV1/FVc 31  mRC score 2  New concerns: None.     Exam: appears well, vitals normal, no respiratory distress, acyanotic, normal RR, chest clear, no wheezing, crepitations, rhonchi, normal symmetric air entry.     Assessment: COPD reasonably well controlled.     Plan: current treatment plan is effective, no change in therapy.            Relevant Orders    Spirometry with/without bronchodilator    X-Ray Chest PA And Lateral    Exercise hypoxemia (Chronic)     Oxygen 2.0 LPM          Hypoxemia requiring supplemental oxygen     Oxygen 2.0 LPM Pulse flow  HMS DME           Other Visit Diagnoses    None.       Plan:       COPD stable    Return in about 4 months (around 11/12/2017) for Spirometry and cxr.    This note was prepared using voice recognition system and is likely to have sound alike errors that may have been overlooked even after proof reading.  Please call me with any questions    Discussed diagnosis, its evaluation, treatment and usual course. All questions answered.    Thank you for the courtesy of participating in the care of this patient    Vinicius Cat MD

## 2017-07-17 ENCOUNTER — OFFICE VISIT (OUTPATIENT)
Dept: PHYSICAL MEDICINE AND REHAB | Facility: CLINIC | Age: 82
End: 2017-07-17
Payer: MEDICARE

## 2017-07-17 VITALS
RESPIRATION RATE: 18 BRPM | HEIGHT: 66 IN | DIASTOLIC BLOOD PRESSURE: 78 MMHG | WEIGHT: 179 LBS | HEART RATE: 98 BPM | BODY MASS INDEX: 28.77 KG/M2 | SYSTOLIC BLOOD PRESSURE: 152 MMHG

## 2017-07-17 DIAGNOSIS — M79.18 MYOFASCIAL PAIN: ICD-10-CM

## 2017-07-17 DIAGNOSIS — M19.011 DJD OF RIGHT AC (ACROMIOCLAVICULAR) JOINT: Primary | ICD-10-CM

## 2017-07-17 DIAGNOSIS — M75.81 ROTATOR CUFF TENDONITIS, RIGHT: ICD-10-CM

## 2017-07-17 PROCEDURE — 1125F AMNT PAIN NOTED PAIN PRSNT: CPT | Mod: ,,, | Performed by: PHYSICAL MEDICINE & REHABILITATION

## 2017-07-17 PROCEDURE — 1159F MED LIST DOCD IN RCRD: CPT | Mod: ,,, | Performed by: PHYSICAL MEDICINE & REHABILITATION

## 2017-07-17 PROCEDURE — 99213 OFFICE O/P EST LOW 20 MIN: CPT | Mod: S$PBB,,, | Performed by: PHYSICAL MEDICINE & REHABILITATION

## 2017-07-17 PROCEDURE — 99999 PR PBB SHADOW E&M-EST. PATIENT-LVL III: CPT | Mod: PBBFAC,,, | Performed by: PHYSICAL MEDICINE & REHABILITATION

## 2017-07-17 PROCEDURE — 99213 OFFICE O/P EST LOW 20 MIN: CPT | Mod: PBBFAC,PO | Performed by: PHYSICAL MEDICINE & REHABILITATION

## 2017-07-17 NOTE — PROGRESS NOTES
PM&R CLINIC NOTE    Chief Complaint   Patient presents with    Shoulder Pain       HPI: This is a 88 y.o.  male being seen in clinic today for follow up of right shoulder pain that has persisted after falling.  He reports some improvement with PT, but still having some pain at his ant and post shoulder and muscle tightness    History obtained from patient    Review of Systems:     General- denies lethargy, weight change, fever, chills  Head/neck- denies swallowing difficulties  ENT- +hearing changes  Cardiovascular-denies chest pain  Pulmonary- +shortness of breath- COPD on O2  GI- denies constipation or bowel incontinence  - denies bladder incontinence  Skin- denies wounds or rashes  Musculoskeletal- +weakness, +pain  Neurologic- +/-numbness and tingling  Psychiatric- denies depressive or psychotic features, denies anxiety  Lymphatic-denies swelling  Endocrine- denies hypoglycemic symptoms/DM history    Physical Examination:  General: Well developed, well nourished male, NAD    Spinal Examination: CERVICAL  Active ROM is within normal limits.  Inspection: No deformity of spinal alignment. Large fluctuant mass near ant neck and back-lipoma  Palpation: ttp at right trapezius, deltoid, triceps, rhomboid on right    Spinal Examination: LUMBAR or THORACIC  Active ROM is within normal limits.  Inspection: No deformity of spinal alignment.    Bilateral Upper and Lower Extremities:  Shoulder/Elbow/Wrist/Hand ROM wnl except limited at full ROM at right shoulder in abduction, but improved, improved ER, still limited in IR  Hip/Knee/Ankle ROM   Bilateral Extremities show normal capillary refill.  No signs of cyanosis, rubor, edema, skin changes, or dysvascular changes of appendages.  Nails appear intact.    Neurological Exam:  Cranial Nerves:  II-XII grossly intact    Manual Muscle Testing: (Motor 5=normal)    RIGHT Upper extremity: Shoulder abduction 4+p/5, Biceps 5/5, Triceps 5/5, Wrist extension 5/5, Abductor pollicis  brevis 5/5, Ulnar hand intrinsics 5/5,  LEFT Upper extremity: Shoulder abduction 5/5, Biceps 5/5, Triceps 5/5, Wrist extension 5/5, Abductor pollicis brevis 5/5, Ulnar hand intrinsics 5/5,  No focal atrophy is noted of either upper or lower extremity.    Gait: Narrow base and good arm swing.    IMPRESSION/PLAN: This is a 88 y.o.  male with right shoulder pain due to DJD, cuff tendonitis, myofascial pain    1. Update order for PT-Lewy--Cuff strengthening, ROM, dec pain, myofascial release, modalities, try dry needle  2. Cont tylenol prn, ice/heat modalities  3. reviewed xrays with patient and wife  4. Noman May M.D.  [facility]

## 2017-07-18 NOTE — TELEPHONE ENCOUNTER
----- Message from Ernesto Mayorga sent at 7/18/2017  8:02 AM CDT -----  Contact: [phone]  Pt would like to consult with nurse about refill on Omeprazole.please call back at [phone]. Thx. ricco        [facility] [address] [place] [address] [place] [address]  [address] [address] [address]  Phone: [phone] Fax: [phone]

## 2017-08-08 ENCOUNTER — OFFICE VISIT (OUTPATIENT)
Dept: OPHTHALMOLOGY | Facility: CLINIC | Age: 82
End: 2017-08-08
Payer: MEDICARE

## 2017-08-08 DIAGNOSIS — H35.3210 AGE-RELATED MACULAR DEGENERATION, WET, RIGHT EYE: Primary | ICD-10-CM

## 2017-08-08 DIAGNOSIS — H04.129 DRYNESS, EYE: ICD-10-CM

## 2017-08-08 DIAGNOSIS — H02.006 ENTROPION, LEFT: ICD-10-CM

## 2017-08-08 PROCEDURE — 99212 OFFICE O/P EST SF 10 MIN: CPT | Mod: PBBFAC,PO | Performed by: OPHTHALMOLOGY

## 2017-08-08 PROCEDURE — 99999 PR PBB SHADOW E&M-EST. PATIENT-LVL II: CPT | Mod: PBBFAC,,, | Performed by: OPHTHALMOLOGY

## 2017-08-08 PROCEDURE — 92014 COMPRE OPH EXAM EST PT 1/>: CPT | Mod: S$PBB,,, | Performed by: OPHTHALMOLOGY

## 2017-08-08 NOTE — PROGRESS NOTES
===============================  08/08/2017   Kelton Mcdaniels,   88 y.o. male   Last visit [facility]: :7/3/2017   [facility]  [facility]  Cor[place] distance visual acuity was 20/200 in the right eye and 20/40 in the left eye.  Tonometry     Tonometry (Applanation, 9:21 AM)       Right Left    Pressure 12 12               Not recorded         Not recorded        Chief Complaint   Patient presents with    Macular Degeneration     2 days ago his left eye was like a fog over it/  but today it has improved        HPI     Macular Degeneration    Additional comments: 2 days ago his left eye was like a fog over it/  but   today it has improved           Comments   --Smd/srn od dx. 10/5/15   --2+ vac cat ou      EYLEA OD 1/20/17  H/o Avastin       Last edited by Megan Leong on 8/8/2017  9:17 AM. (History)      Read Studies:   Vitalsy  ________________  8/8/2017  Problem List Items Addressed This Visit        Eye/Vision problems    Age-related macular degeneration, wet, right eye - Primary    Overview     Diagnosed 10/5/15 symptomatic 4 months at that time.  Treated with Avastin then Eylea         Entropion of left eyelid      Other Visit Diagnoses     Dryness, eye            moct OD slight worse  But treat only if symptoms worse, and asymptomatic  Dry eye, recommend at's frequently  Entropion, mechanical OS, follow  rtc as scheduled  .  .       ===========================

## 2017-08-23 ENCOUNTER — LAB VISIT (OUTPATIENT)
Dept: LAB | Facility: HOSPITAL | Age: 82
End: 2017-08-23
Attending: INTERNAL MEDICINE
Payer: MEDICARE

## 2017-08-23 DIAGNOSIS — I10 ESSENTIAL HYPERTENSION: ICD-10-CM

## 2017-08-23 DIAGNOSIS — K21.9 GASTROESOPHAGEAL REFLUX DISEASE, ESOPHAGITIS PRESENCE NOT SPECIFIED: ICD-10-CM

## 2017-08-23 LAB
ALBUMIN SERPL BCP-MCNC: 4 G/DL
ALP SERPL-CCNC: 69 U/L
ALT SERPL W/O P-5'-P-CCNC: 16 U/L
ANION GAP SERPL CALC-SCNC: 10 MMOL/L
AST SERPL-CCNC: 18 U/L
BASOPHILS # BLD AUTO: 0.01 K/UL
BASOPHILS NFR BLD: 0.1 %
BILIRUB SERPL-MCNC: 0.7 MG/DL
BUN SERPL-MCNC: 9 MG/DL
CALCIUM SERPL-MCNC: 9.8 MG/DL
CHLORIDE SERPL-SCNC: 99 MMOL/L
CHOLEST/HDLC SERPL: 3.2 {RATIO}
CO2 SERPL-SCNC: 31 MMOL/L
CREAT SERPL-MCNC: 0.9 MG/DL
DIFFERENTIAL METHOD: ABNORMAL
EOSINOPHIL # BLD AUTO: 0.1 K/UL
EOSINOPHIL NFR BLD: 1.9 %
ERYTHROCYTE [DISTWIDTH] IN BLOOD BY AUTOMATED COUNT: 13.1 %
EST. GFR  (AFRICAN AMERICAN): >60 ML/MIN/1.73 M^2
EST. GFR  (NON AFRICAN AMERICAN): >60 ML/MIN/1.73 M^2
GLUCOSE SERPL-MCNC: 89 MG/DL
HCT VFR BLD AUTO: 45.7 %
HDL/CHOLESTEROL RATIO: 31.6 %
HDLC SERPL-MCNC: 190 MG/DL
HDLC SERPL-MCNC: 60 MG/DL
HGB BLD-MCNC: 15.3 G/DL
LDLC SERPL CALC-MCNC: 97.4 MG/DL
LYMPHOCYTES # BLD AUTO: 1.8 K/UL
LYMPHOCYTES NFR BLD: 24.6 %
MCH RBC QN AUTO: 32.3 PG
MCHC RBC AUTO-ENTMCNC: 33.5 G/DL
MCV RBC AUTO: 96 FL
MONOCYTES # BLD AUTO: 0.5 K/UL
MONOCYTES NFR BLD: 7 %
NEUTROPHILS # BLD AUTO: 4.8 K/UL
NEUTROPHILS NFR BLD: 66.1 %
NONHDLC SERPL-MCNC: 130 MG/DL
PLATELET # BLD AUTO: 165 K/UL
PMV BLD AUTO: 10.8 FL
POTASSIUM SERPL-SCNC: 4 MMOL/L
PROT SERPL-MCNC: 7.3 G/DL
RBC # BLD AUTO: 4.74 M/UL
SODIUM SERPL-SCNC: 140 MMOL/L
TRIGL SERPL-MCNC: 163 MG/DL
TSH SERPL DL<=0.005 MIU/L-ACNC: 3.3 UIU/ML
WBC # BLD AUTO: 7.19 K/UL

## 2017-08-23 PROCEDURE — 85025 COMPLETE CBC W/AUTO DIFF WBC: CPT

## 2017-08-23 PROCEDURE — 80053 COMPREHEN METABOLIC PANEL: CPT

## 2017-08-23 PROCEDURE — 80061 LIPID PANEL: CPT

## 2017-08-23 PROCEDURE — 84443 ASSAY THYROID STIM HORMONE: CPT

## 2017-08-23 PROCEDURE — 36415 COLL VENOUS BLD VENIPUNCTURE: CPT | Mod: PO

## 2017-08-23 RX ORDER — OMEPRAZOLE 20 MG/1
20 CAPSULE, DELAYED RELEASE ORAL DAILY
Qty: 90 CAPSULE | Refills: 3 | Status: SHIPPED | OUTPATIENT
Start: 2017-08-23 | End: 2018-07-22 | Stop reason: SDUPTHER

## 2017-08-29 ENCOUNTER — OFFICE VISIT (OUTPATIENT)
Dept: INTERNAL MEDICINE | Facility: CLINIC | Age: 82
End: 2017-08-29
Payer: MEDICARE

## 2017-08-29 VITALS
WEIGHT: 182.31 LBS | DIASTOLIC BLOOD PRESSURE: 72 MMHG | BODY MASS INDEX: 28.61 KG/M2 | SYSTOLIC BLOOD PRESSURE: 126 MMHG | HEART RATE: 88 BPM | TEMPERATURE: 98 F | HEIGHT: 67 IN | OXYGEN SATURATION: 92 %

## 2017-08-29 DIAGNOSIS — J44.9 COPD, SEVERE: Chronic | ICD-10-CM

## 2017-08-29 DIAGNOSIS — E78.2 MIXED HYPERLIPIDEMIA: ICD-10-CM

## 2017-08-29 DIAGNOSIS — I27.20 PULMONARY HYPERTENSION: ICD-10-CM

## 2017-08-29 DIAGNOSIS — I25.10 CAD IN NATIVE ARTERY: ICD-10-CM

## 2017-08-29 DIAGNOSIS — I47.19 MULTIFOCAL ATRIAL TACHYCARDIA: ICD-10-CM

## 2017-08-29 DIAGNOSIS — K21.9 GASTROESOPHAGEAL REFLUX DISEASE, ESOPHAGITIS PRESENCE NOT SPECIFIED: ICD-10-CM

## 2017-08-29 DIAGNOSIS — I10 ESSENTIAL HYPERTENSION: ICD-10-CM

## 2017-08-29 DIAGNOSIS — R73.01 IMPAIRED FASTING GLUCOSE: Primary | ICD-10-CM

## 2017-08-29 DIAGNOSIS — R09.02 EXERCISE HYPOXEMIA: Chronic | ICD-10-CM

## 2017-08-29 PROCEDURE — 1125F AMNT PAIN NOTED PAIN PRSNT: CPT | Mod: ,,, | Performed by: INTERNAL MEDICINE

## 2017-08-29 PROCEDURE — 99999 PR PBB SHADOW E&M-EST. PATIENT-LVL III: CPT | Mod: PBBFAC,,, | Performed by: INTERNAL MEDICINE

## 2017-08-29 PROCEDURE — 99213 OFFICE O/P EST LOW 20 MIN: CPT | Mod: PBBFAC,PO | Performed by: INTERNAL MEDICINE

## 2017-08-29 PROCEDURE — 99214 OFFICE O/P EST MOD 30 MIN: CPT | Mod: S$PBB,,, | Performed by: INTERNAL MEDICINE

## 2017-08-29 PROCEDURE — 1159F MED LIST DOCD IN RCRD: CPT | Mod: ,,, | Performed by: INTERNAL MEDICINE

## 2017-08-29 NOTE — PROGRESS NOTES
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  He states his breathing is doing about the same.  He remains on 2 L.  He denies any chest pains.  He denies any change in his chronic dyspnea.  There've been no other new problems or complaints    Current meds have been verified and updated per the EMR  Exam:/72 (BP Location: Left arm, Patient Position: Sitting, BP Method: Medium (Manual))   Pulse 88   Temp 98.1 °F (36.7 °C) (Tympanic)   Ht 5' 7" (1.702 m)   Wt 82.7 kg (182 lb 5.1 oz)   SpO2 (!) 92% Comment: O 2 at 2 liters  BMI 28.56 kg/m²   Chest clear with very distant breath sounds  Cardiovascular regular rate and rhythm with 1 to 2/6 systolic murmur  Extremities without edema    Lab Results   Component Value Date    WBC 7.19 08/23/2017    HGB 15.3 08/23/2017    HCT 45.7 08/23/2017     08/23/2017    CHOL 190 08/23/2017    TRIG 163 (H) 08/23/2017    HDL 60 08/23/2017    ALT 16 08/23/2017    AST 18 08/23/2017     08/23/2017    K 4.0 08/23/2017    CL 99 08/23/2017    CREATININE 0.9 08/23/2017    BUN 9 08/23/2017    CO2 31 (H) 08/23/2017    TSH 3.297 08/23/2017    PSA 3.8 08/15/2011    INR 1.0 12/08/2015    HGBA1C 6.1 08/10/2016       Impression:  Multiple medical problems identified below, stable  Patient Active Problem List   Diagnosis    Impaired fasting glucose    Essential hypertension    Esophageal reflux    Hypertrophy of prostate without urinary obstruction and other lower urinary tract symptoms (LUTS)    History of colonic polyps    Lumbar radiculopathy    Age-related macular degeneration, wet, right eye    CAD in native artery    Mixed hyperlipidemia    Pulmonary hypertension    TR (tricuspid regurgitation)    Multifocal atrial tachycardia    COPD, severe    Gross hematuria    Exercise hypoxemia    Hypoxemia requiring supplemental oxygen    Anxiety    History of bladder " cancer    History of colon cancer    History of 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  He'll see me again in 6 months with above lab work.  His medications remain the same

## 2017-09-05 RX ORDER — SIMVASTATIN 40 MG/1
TABLET, FILM COATED ORAL
Qty: 90 TABLET | Refills: 3 | OUTPATIENT
Start: 2017-09-05

## 2017-09-08 ENCOUNTER — TELEPHONE (OUTPATIENT)
Dept: INTERNAL MEDICINE | Facility: CLINIC | Age: 82
End: 2017-09-08

## 2017-09-08 RX ORDER — SIMVASTATIN 40 MG/1
40 TABLET, FILM COATED ORAL NIGHTLY
Qty: 90 TABLET | Refills: 3 | Status: SHIPPED | OUTPATIENT
Start: 2017-09-08 | End: 2018-09-04 | Stop reason: SDUPTHER

## 2017-09-08 NOTE — TELEPHONE ENCOUNTER
----- Message from Noel Lamas sent at 9/8/2017 11:04 AM CDT -----  Contact: pt's spouse  1. What is the name of the medication you are requesting? Simvastatin  2. What is the dose? 40 mg  3. How do you take the medication? Orally, topically, etc? Orally  4. How often do you take this medication? Once daily  5. Do you need a 30 day or 90 day supply? 90  6. How many refills are you requesting? 3  7. What is your preferred pharmacy and location of the pharmacy? [facility] in [place]  8. Who can we contact with further questions? [phone] (home)

## 2017-10-03 DIAGNOSIS — J44.9 COPD, SEVERE: Chronic | ICD-10-CM

## 2017-10-03 RX ORDER — FLUTICASONE PROPIONATE AND SALMETEROL 250; 50 UG/1; UG/1
POWDER RESPIRATORY (INHALATION)
Qty: 60 EACH | Refills: 11 | Status: SHIPPED | OUTPATIENT
Start: 2017-10-03 | End: 2018-10-14 | Stop reason: SDUPTHER

## 2017-10-25 ENCOUNTER — TELEPHONE (OUTPATIENT)
Dept: PULMONOLOGY | Facility: CLINIC | Age: 82
End: 2017-10-25

## 2017-10-25 DIAGNOSIS — J44.9 CHRONIC OBSTRUCTIVE PULMONARY DISEASE, UNSPECIFIED COPD TYPE: Primary | ICD-10-CM

## 2017-10-25 NOTE — TELEPHONE ENCOUNTER
----- Message from Shante Thomas sent at 10/25/2017  2:53 PM CDT -----  Contact: Pt-wife Kathryn  Pt-wife Kathryn called in regards to pt oxygen has some questions and concerns callback number is...[phone]

## 2017-11-07 ENCOUNTER — OFFICE VISIT (OUTPATIENT)
Dept: OPHTHALMOLOGY | Facility: CLINIC | Age: 82
End: 2017-11-07
Payer: MEDICARE

## 2017-11-07 DIAGNOSIS — H35.3210 AGE-RELATED MACULAR DEGENERATION, WET, RIGHT EYE: Primary | ICD-10-CM

## 2017-11-07 PROCEDURE — 99212 OFFICE O/P EST SF 10 MIN: CPT | Mod: PBBFAC,PO | Performed by: OPHTHALMOLOGY

## 2017-11-07 PROCEDURE — 99999 PR PBB SHADOW E&M-EST. PATIENT-LVL II: CPT | Mod: PBBFAC,,, | Performed by: OPHTHALMOLOGY

## 2017-11-07 PROCEDURE — 92014 COMPRE OPH EXAM EST PT 1/>: CPT | Mod: S$PBB,,, | Performed by: OPHTHALMOLOGY

## 2017-11-07 PROCEDURE — 92134 CPTRZ OPH DX IMG PST SGM RTA: CPT | Mod: PBBFAC,PO | Performed by: OPHTHALMOLOGY

## 2017-11-07 NOTE — PROGRESS NOTES
===============================  11/07/2017   Kelton Mcdaniels,   88 y.o. male   Last visit [facility]: :8/8/2017   [facility]  [facility]  Corrected distance visual acuity was 20/400 in the right eye and 20/30 in the left eye.  Tonometry     Tonometry (Applanation, 9:38 AM)       Right Left    Pressure 18 18               Not recorded         Not recorded        Chief Complaint   Patient presents with    Macular Degeneration     4 months check up        HPI     Macular Degeneration    Additional comments: 4 months check up           Comments   --Smd/srn od dx. 10/5/15   --2+ vac cat ou      EYLEA OD 1/20/17  H/o Avastin       Last edited by Megan Leong on 11/7/2017  9:29 AM. (History)          ________________  11/7/2017  Problem List Items Addressed This Visit        Eye/Vision problems    Age-related macular degeneration, wet, right eye - Primary    Overview     Diagnosed 10/5/15 symptomatic 4 months at that time.  Treated with Avastin then Eylea         Relevant Orders    Posterior Segment OCT Retina-Both eyes (Completed)      od treat only if visually symtgoit    Oct worswe od   od oct ok     rtc 34- months Call 24/7 for any worsening of vision. Check OU QD. Gave my home phone number.    .       ===========================

## 2017-11-13 ENCOUNTER — TELEPHONE (OUTPATIENT)
Dept: PULMONOLOGY | Facility: CLINIC | Age: 82
End: 2017-11-13

## 2017-11-14 ENCOUNTER — PROCEDURE VISIT (OUTPATIENT)
Dept: PULMONOLOGY | Facility: CLINIC | Age: 82
End: 2017-11-14
Payer: MEDICARE

## 2017-11-14 ENCOUNTER — HOSPITAL ENCOUNTER (OUTPATIENT)
Dept: RADIOLOGY | Facility: HOSPITAL | Age: 82
Discharge: HOME | End: 2017-11-14
Attending: INTERNAL MEDICINE
Payer: MEDICARE

## 2017-11-14 ENCOUNTER — OFFICE VISIT (OUTPATIENT)
Dept: PULMONOLOGY | Facility: CLINIC | Age: 82
End: 2017-11-14
Payer: MEDICARE

## 2017-11-14 VITALS
HEIGHT: 67 IN | RESPIRATION RATE: 18 BRPM | HEIGHT: 67 IN | SYSTOLIC BLOOD PRESSURE: 160 MMHG | BODY MASS INDEX: 27.94 KG/M2 | HEART RATE: 80 BPM | WEIGHT: 178 LBS | OXYGEN SATURATION: 97 % | BODY MASS INDEX: 27.96 KG/M2 | WEIGHT: 178.13 LBS | DIASTOLIC BLOOD PRESSURE: 80 MMHG

## 2017-11-14 DIAGNOSIS — J44.9 COPD, SEVERE: Primary | Chronic | ICD-10-CM

## 2017-11-14 DIAGNOSIS — J44.9 COPD, SEVERE: Chronic | ICD-10-CM

## 2017-11-14 DIAGNOSIS — I27.20 PULMONARY HYPERTENSION: ICD-10-CM

## 2017-11-14 DIAGNOSIS — Z99.81 HYPOXEMIA REQUIRING SUPPLEMENTAL OXYGEN: ICD-10-CM

## 2017-11-14 DIAGNOSIS — R09.02 EXERCISE HYPOXEMIA: Chronic | ICD-10-CM

## 2017-11-14 DIAGNOSIS — R09.02 HYPOXEMIA REQUIRING SUPPLEMENTAL OXYGEN: ICD-10-CM

## 2017-11-14 DIAGNOSIS — J44.9 CHRONIC OBSTRUCTIVE PULMONARY DISEASE, UNSPECIFIED COPD TYPE: ICD-10-CM

## 2017-11-14 LAB
POST FEF 25 75: 0.23 L/S (ref 0.56–2.02)
POST FET 100: 14.28 S
POST FEV1 FVC: 31 %
POST FEV1: 0.88 L (ref 1.79–2.51)
POST FVC: 2.8 L (ref 2.74–3.59)
POST PEF: 3.48 L/S (ref 4.41–6.53)
PRE FEF 25 75: 0.22 L/S (ref 0.56–2.02)
PRE FET 100: 14.23 S
PRE FEV1 FVC: 30 %
PRE FEV1: 0.75 L (ref 1.79–2.51)
PRE FVC: 2.5 L (ref 2.74–3.59)
PRE PEF: 2.66 L/S (ref 4.41–6.53)
PREDICTED FEV1 FVC: 69.89 % (ref 65.05–74.72)
PREDICTED FEV1: 2.15 L (ref 1.79–2.51)
PREDICTED FVC: 3.17 L (ref 2.74–3.59)
PROVOCATION PROTOCOL: ABNORMAL

## 2017-11-14 PROCEDURE — 99214 OFFICE O/P EST MOD 30 MIN: CPT | Mod: PBBFAC,25 | Performed by: INTERNAL MEDICINE

## 2017-11-14 PROCEDURE — G0008 ADMIN INFLUENZA VIRUS VAC: HCPCS | Mod: PBBFAC

## 2017-11-14 PROCEDURE — 94620 PR PULMONARY STRESS TESTING,SIMPLE: CPT | Mod: 26,S$PBB,, | Performed by: INTERNAL MEDICINE

## 2017-11-14 PROCEDURE — 71020 XR CHEST PA AND LATERAL: CPT | Mod: TC

## 2017-11-14 PROCEDURE — 94060 EVALUATION OF WHEEZING: CPT | Mod: 26,59,S$PBB, | Performed by: INTERNAL MEDICINE

## 2017-11-14 PROCEDURE — 99999 PR PBB SHADOW E&M-EST. PATIENT-LVL IV: CPT | Mod: PBBFAC,,, | Performed by: INTERNAL MEDICINE

## 2017-11-14 PROCEDURE — 99214 OFFICE O/P EST MOD 30 MIN: CPT | Mod: 25,S$PBB,, | Performed by: INTERNAL MEDICINE

## 2017-11-14 PROCEDURE — 94060 EVALUATION OF WHEEZING: CPT | Mod: PBBFAC

## 2017-11-14 PROCEDURE — 71020 XR CHEST PA AND LATERAL: CPT | Mod: 26,,, | Performed by: RADIOLOGY

## 2017-11-14 PROCEDURE — 94620 PR PULMONARY STRESS TESTING,SIMPLE: CPT | Mod: PBBFAC

## 2017-11-14 NOTE — ASSESSMENT & PLAN NOTE
6MWD distance improved from 121 m to 182 m  BP was  Elevated 158/102  Jack score 5-6  SpO2 at rest 96%  SpO2 imer 5th min was 88%  Oxygen added 2.0 LPM

## 2017-11-14 NOTE — ASSESSMENT & PLAN NOTE
COPD ROS: taking medications as instructed, no medication side effects noted, no significant ongoing wheezing or shortness of breath, using bronchodilator MDI less than twice a week.   Mediations: Proair HFA, Tiotropium bromide, Accapella, Daliresp, Advair Diskus  nebuliser DUONEB PRN  CAT score 5.  FEV1 : 0.75 L( 37%), FEV1/FVc 31  mRC score 2    New concerns: None.      Exam: appears well, vitals normal, no respiratory distress, acyanotic, normal RR, chest clear, no wheezing, crepitations, rhonchi, normal symmetric air entry.      Assessment: COPD reasonably well controlled.     Plan: current treatment plan is effective, no change in therapy.     FEv1 0.76 ( 35%)  FEV1 improved: 16%  FVC improved by 12%

## 2017-11-14 NOTE — PROGRESS NOTES
"Subjective:       Patient ID: Kelton Mcdaniels is a 88 y.o. male.    Chief Complaint: COPD, severe    Mr. Kelton Mcdaniels is 88 years old  He has severe COPD with an FEV1 of 0.76 (35% predicted)  This is marginally improved from prior study   MRC grade score is 2, able to move trash to curb side  COPD test score is 5  Medications have been reviewed he looks like he is doing much better    Medications reviewed Daliresp, Advair, DuoNeb and Spiriva Respimat daily   He is on oxygen supplement to the 2.0  L/m    Patient benefited from pulmonary rehabilitation  His anxiety is much better controlled with the when necessary use of the Xanax  Using self coping skills  Chest x-ray was reviewed is clear hyperinflation noted        Review of Systems   Constitutional: Positive for fatigue.   HENT: Negative.    Eyes: Negative.    Respiratory: Negative for snoring, sputum production, shortness of breath, wheezing and use of rescue inhaler.    Cardiovascular: Negative.    Genitourinary: Negative.    Endocrine: endocrine negative   Musculoskeletal: Negative.    Skin: Negative.         Easily bruises   Gastrointestinal: Negative.    Neurological: Negative.    Psychiatric/Behavioral: Negative.        Objective:       Vitals:    11/14/17 1021   BP: (!) 160/80   Pulse: 80   Resp: 18   SpO2: 97%  Comment: 2 liter/min   Weight: 80.7 kg (178 lb)   Height: 5' 7" (1.702 m)     Physical Exam   Constitutional: He is oriented to person, place, and time. He appears well-nourished. He appears not cachectic.   HENT:   Head: Normocephalic.   Nose: Nose normal.   Mouth/Throat: No oropharyngeal exudate.   Neck: Normal range of motion. Neck supple. No tracheal deviation present. No thyromegaly present.   Cardiovascular: Normal rate, regular rhythm and normal heart sounds.    No murmur heard.  Pulmonary/Chest: Normal expansion and hyperinflation. He has decreased breath sounds. He has no wheezes. He has no rhonchi. Negative for tactile fremitus. " "  Abdominal: Soft. Bowel sounds are normal.   Lymphadenopathy:     He has no cervical adenopathy.   Neurological: He is alert and oriented to person, place, and time.   Skin: Skin is dry. There is erythema. No cyanosis. Nails show no clubbing.   Psychiatric: He has a normal mood and affect. His behavior is normal.   Nursing note and vitals reviewed.    Personal Diagnostic Review   6MW Test  Height: 5' 7" (170.2 cm)  Weight: 80.7 kg (178 lb)  BMI (Calculated): 27.9  Predicted Distance: 219.76  Interpretation  Predicted Distance Meters (Calculated): 395.55 meters  Distance was 46% predicted  Improved    Pulmonary function tests: FEV1: 0.76  (35 % predicted), FVC:  2.50 (79 % predicted), FEV1/FVC:  30    CXR: hyperinflation     No flowsheet data found.      Assessment:       Problem List Items Addressed This Visit     COPD, severe - Primary (Chronic)     COPD ROS: taking medications as instructed, no medication side effects noted, no significant ongoing wheezing or shortness of breath, using bronchodilator MDI less than twice a week.   Mediations: Proair HFA, Tiotropium bromide, Accapella, Daliresp, Advair Diskus  nebuliser DUONEB PRN  CAT score 5.  FEV1 : 0.75 L( 37%), FEV1/FVc 31  mRC score 2    New concerns: None.      Exam: appears well, vitals normal, no respiratory distress, acyanotic, normal RR, chest clear, no wheezing, crepitations, rhonchi, normal symmetric air entry.      Assessment: COPD reasonably well controlled.     Plan: current treatment plan is effective, no change in therapy.     FEv1 0.76 ( 35%)  FEV1 improved: 16%  FVC improved by 12%         Relevant Orders    X-Ray Chest PA And Lateral    Spirometry without Bronchodilator    Exercise hypoxemia (Chronic)     6MWD distance improved from 121 m to 182 m  BP was  Elevated 158/102  Jack score 5-6  SpO2 at rest 96%  SpO2 imer 5th min was 88%  Oxygen added 2.0 LPM         Pulmonary hypertension     Group 3 PH           Hypoxemia requiring supplemental " oxygen     HMS DME   2.0 LPM             Plan:       Blood pressure was elevated during exercise.  Patient is going to watch his salt and fluid intake     Return in about 6 months (around 5/14/2018) for Spirometry and CXR next visit, nurse schedule flu shot schedule.    This note was prepared using voice recognition system and is likely to have sound alike errors that may have been overlooked even after proof reading.  Please call me with any questions    Discussed diagnosis, its evaluation, treatment and usual course. All questions answered.    Thank you for the courtesy of participating in the care of this patient    Vinicius Cat MD

## 2017-11-14 NOTE — PROCEDURES
"[facility] - Pulm Function Svcs  Six Minute Walk     SUMMARY     Ordering Provider: Dr Cat   Interpreting Provider: Dr. Cat  Performing nurse/tech/RT: FRANTZ  Diagnosis: COPD  Height: 5' 7" (170.2 cm)  Weight: 80.8 kg (178 lb 2.1 oz)  BMI (Calculated): 28   Patient Race:             Phase Oxygen Assessment Supplemental O2 Heart   Rate Blood Pressure Jack Dyspnea Scale Rating   Resting 96 % Room Air 87 bpm (!) 155/92 4   Exercise        Minute        1 94 % Room Air 96 bpm     2 90 % Room Air 103 bpm     3 92 % Room Air 101 bpm     4 91 % Room Air 117 bpm     5 88 % Room Air 114 bpm     6  90 % 2 L/M 138 bpm (!) 158/102 5-6   Recovery        Minute        1 97 % 2 L/M 135 bpm     2 98 % 2 L/M 120 bpm     3 99 % 2 L/M 133 bpm     4 99 % 2 L/M 120 bpm (!) 154/103 3     Six Minute Walk Summary  6MWT Status: completed with stops  Patient Reported: Dyspnea     Interpretation:  Did the patient stop or pause?: Yes  How many times did the patient stop or pause?: 2  Stop Time 1: 141  Restart Time 1: 187.8  Pause Time 1: 46.8 seconds  Stop Time 2: 241.2  Restart Time 2: 263.4  Pause Time 2: 22.2 seconds                    Total Time Walked (Calculated): 291 seconds  Final Partial Lap Distance (feet): 0 feet  Total Distance Meters (Calculated): 182.88 meters  Predicted Distance Meters (Calculated): 395.45 meters  Percentage of Predicted (Calculated): 46.25  Peak VO2 (Calculated): 9.47  Mets: 2.71  Has The Patient Had a Previous Six Minute Walk Test?: Yes       Previous 6MWT Results  Has The Patient Had a Previous Six Minute Walk Test?: Yes  Date of Previous Test: 07/26/16  Total Time Walked: 220.2 seconds  Total Distance (meters): 121.92  Predicted Distance (meters): 389.58 meters  Percentage of Predicted: 31.3  Percent Change (Calculated): -0.5     REPORT    Six minute walk distance is 182.88 meters(46.25 % predicted) with Moderate dyspnea.   Peak VO2 during walking was 9.47 Ml/min/kg [ 2.71 METS].   During " exercise, there was   significant desaturation while breathing room air.  SpO2 imer was 88% at 5th minute . Blood pressure was elevated through out and Heart rate max was 138BPM with walking. The patient reported no non-pulmonary symptoms during exercise   .  Test completed with Stopd    The patient did complete the study, walking 360 seconds of the 360 second test.   On previous study performed 07/26/2016: walked 121.92 m .   Based upon age and body mass index, exercise capacity is less than predicted however improved.      Vinicius Cat MD

## 2018-01-23 DIAGNOSIS — I25.10 CAD IN NATIVE ARTERY: ICD-10-CM

## 2018-01-23 RX ORDER — CARVEDILOL 25 MG/1
TABLET ORAL
Qty: 60 TABLET | Refills: 11 | Status: SHIPPED | OUTPATIENT
Start: 2018-01-23 | End: 2018-02-28

## 2018-01-23 RX ORDER — CARVEDILOL 25 MG/1
25 TABLET ORAL 2 TIMES DAILY
Qty: 60 TABLET | Refills: 11 | Status: SHIPPED | OUTPATIENT
Start: 2018-01-23 | End: 2019-03-02 | Stop reason: SDUPTHER

## 2018-02-05 DIAGNOSIS — I10 ESSENTIAL HYPERTENSION: ICD-10-CM

## 2018-02-05 RX ORDER — FINASTERIDE 5 MG/1
TABLET, FILM COATED ORAL
Qty: 30 TABLET | Refills: 11 | Status: SHIPPED | OUTPATIENT
Start: 2018-02-05 | End: 2018-03-14 | Stop reason: SDUPTHER

## 2018-02-05 RX ORDER — HYDROCHLOROTHIAZIDE 25 MG/1
TABLET ORAL
Qty: 90 TABLET | Refills: 3 | Status: SHIPPED | OUTPATIENT
Start: 2018-02-05 | End: 2019-02-02 | Stop reason: SDUPTHER

## 2018-02-06 DIAGNOSIS — J44.9 COPD, SEVERE: Chronic | ICD-10-CM

## 2018-02-06 RX ORDER — IPRATROPIUM BROMIDE AND ALBUTEROL SULFATE 2.5; .5 MG/3ML; MG/3ML
3 SOLUTION RESPIRATORY (INHALATION)
Qty: 270 ML | Refills: 11 | Status: SHIPPED | OUTPATIENT
Start: 2018-02-06 | End: 2019-03-20 | Stop reason: SDUPTHER

## 2018-02-14 ENCOUNTER — PATIENT OUTREACH (OUTPATIENT)
Dept: ADMINISTRATIVE | Facility: HOSPITAL | Age: 83
End: 2018-02-14

## 2018-02-15 DIAGNOSIS — R06.02 SOB (SHORTNESS OF BREATH): ICD-10-CM

## 2018-02-15 DIAGNOSIS — J44.9 COPD, SEVERE: Chronic | ICD-10-CM

## 2018-02-15 RX ORDER — ALBUTEROL SULFATE 90 UG/1
2 AEROSOL, METERED RESPIRATORY (INHALATION) EVERY 4 HOURS PRN
Qty: 1 INHALER | Refills: 11 | Status: SHIPPED | OUTPATIENT
Start: 2018-02-15 | End: 2019-04-07 | Stop reason: SDUPTHER

## 2018-02-21 ENCOUNTER — LAB VISIT (OUTPATIENT)
Dept: LAB | Facility: HOSPITAL | Age: 83
End: 2018-02-21
Attending: INTERNAL MEDICINE
Payer: MEDICARE

## 2018-02-21 DIAGNOSIS — I10 ESSENTIAL HYPERTENSION: ICD-10-CM

## 2018-02-21 LAB
ALBUMIN SERPL BCP-MCNC: 4 G/DL
ALP SERPL-CCNC: 73 U/L
ALT SERPL W/O P-5'-P-CCNC: 14 U/L
ANION GAP SERPL CALC-SCNC: 12 MMOL/L
AST SERPL-CCNC: 15 U/L
BASOPHILS # BLD AUTO: 0.02 K/UL
BASOPHILS NFR BLD: 0.2 %
BILIRUB SERPL-MCNC: 0.8 MG/DL
BUN SERPL-MCNC: 10 MG/DL
CALCIUM SERPL-MCNC: 9.8 MG/DL
CHLORIDE SERPL-SCNC: 97 MMOL/L
CHOLEST SERPL-MCNC: 169 MG/DL
CHOLEST/HDLC SERPL: 3.5 {RATIO}
CO2 SERPL-SCNC: 33 MMOL/L
CREAT SERPL-MCNC: 0.9 MG/DL
DIFFERENTIAL METHOD: ABNORMAL
EOSINOPHIL # BLD AUTO: 0.1 K/UL
EOSINOPHIL NFR BLD: 0.7 %
ERYTHROCYTE [DISTWIDTH] IN BLOOD BY AUTOMATED COUNT: 12.2 %
EST. GFR  (AFRICAN AMERICAN): >60 ML/MIN/1.73 M^2
EST. GFR  (NON AFRICAN AMERICAN): >60 ML/MIN/1.73 M^2
GLUCOSE SERPL-MCNC: 121 MG/DL
HCT VFR BLD AUTO: 43.9 %
HDLC SERPL-MCNC: 48 MG/DL
HDLC SERPL: 28.4 %
HGB BLD-MCNC: 14.7 G/DL
IMM GRANULOCYTES # BLD AUTO: 0.05 K/UL
IMM GRANULOCYTES NFR BLD AUTO: 0.4 %
LDLC SERPL CALC-MCNC: 97.6 MG/DL
LYMPHOCYTES # BLD AUTO: 1.5 K/UL
LYMPHOCYTES NFR BLD: 12.2 %
MCH RBC QN AUTO: 32 PG
MCHC RBC AUTO-ENTMCNC: 33.5 G/DL
MCV RBC AUTO: 96 FL
MONOCYTES # BLD AUTO: 0.8 K/UL
MONOCYTES NFR BLD: 6.6 %
NEUTROPHILS # BLD AUTO: 10 K/UL
NEUTROPHILS NFR BLD: 79.9 %
NONHDLC SERPL-MCNC: 121 MG/DL
NRBC BLD-RTO: 0 /100 WBC
PLATELET # BLD AUTO: 164 K/UL
PMV BLD AUTO: 10.9 FL
POTASSIUM SERPL-SCNC: 3.7 MMOL/L
PROT SERPL-MCNC: 7.4 G/DL
RBC # BLD AUTO: 4.59 M/UL
SODIUM SERPL-SCNC: 142 MMOL/L
TRIGL SERPL-MCNC: 117 MG/DL
TSH SERPL DL<=0.005 MIU/L-ACNC: 2.38 UIU/ML
WBC # BLD AUTO: 12.52 K/UL

## 2018-02-21 PROCEDURE — 80061 LIPID PANEL: CPT

## 2018-02-21 PROCEDURE — 36415 COLL VENOUS BLD VENIPUNCTURE: CPT | Mod: PO

## 2018-02-21 PROCEDURE — 84443 ASSAY THYROID STIM HORMONE: CPT

## 2018-02-21 PROCEDURE — 80053 COMPREHEN METABOLIC PANEL: CPT

## 2018-02-21 PROCEDURE — 85025 COMPLETE CBC W/AUTO DIFF WBC: CPT

## 2018-02-28 ENCOUNTER — OFFICE VISIT (OUTPATIENT)
Dept: INTERNAL MEDICINE | Facility: CLINIC | Age: 83
End: 2018-02-28
Payer: MEDICARE

## 2018-02-28 VITALS
SYSTOLIC BLOOD PRESSURE: 138 MMHG | OXYGEN SATURATION: 98 % | WEIGHT: 180.13 LBS | HEIGHT: 68 IN | TEMPERATURE: 97 F | BODY MASS INDEX: 27.3 KG/M2 | RESPIRATION RATE: 16 BRPM | DIASTOLIC BLOOD PRESSURE: 84 MMHG | HEART RATE: 84 BPM

## 2018-02-28 DIAGNOSIS — J44.9 COPD, SEVERE: Chronic | ICD-10-CM

## 2018-02-28 DIAGNOSIS — E78.2 MIXED HYPERLIPIDEMIA: ICD-10-CM

## 2018-02-28 DIAGNOSIS — I10 ESSENTIAL HYPERTENSION: ICD-10-CM

## 2018-02-28 DIAGNOSIS — I27.20 PULMONARY HYPERTENSION: ICD-10-CM

## 2018-02-28 DIAGNOSIS — R73.01 IMPAIRED FASTING GLUCOSE: Primary | ICD-10-CM

## 2018-02-28 DIAGNOSIS — I25.10 CAD IN NATIVE ARTERY: ICD-10-CM

## 2018-02-28 DIAGNOSIS — N40.0 BENIGN PROSTATIC HYPERPLASIA, UNSPECIFIED WHETHER LOWER URINARY TRACT SYMPTOMS PRESENT: ICD-10-CM

## 2018-02-28 DIAGNOSIS — Z99.81 HYPOXEMIA REQUIRING SUPPLEMENTAL OXYGEN: ICD-10-CM

## 2018-02-28 DIAGNOSIS — Z85.038 HISTORY OF COLON CANCER: ICD-10-CM

## 2018-02-28 DIAGNOSIS — R09.02 HYPOXEMIA REQUIRING SUPPLEMENTAL OXYGEN: ICD-10-CM

## 2018-02-28 DIAGNOSIS — Z85.51 HISTORY OF BLADDER CANCER: ICD-10-CM

## 2018-02-28 DIAGNOSIS — I47.19 MULTIFOCAL ATRIAL TACHYCARDIA: ICD-10-CM

## 2018-02-28 DIAGNOSIS — K21.9 GASTROESOPHAGEAL REFLUX DISEASE, ESOPHAGITIS PRESENCE NOT SPECIFIED: ICD-10-CM

## 2018-02-28 PROCEDURE — 1159F MED LIST DOCD IN RCRD: CPT | Mod: ,,, | Performed by: INTERNAL MEDICINE

## 2018-02-28 PROCEDURE — 1126F AMNT PAIN NOTED NONE PRSNT: CPT | Mod: ,,, | Performed by: INTERNAL MEDICINE

## 2018-02-28 PROCEDURE — 99213 OFFICE O/P EST LOW 20 MIN: CPT | Mod: PBBFAC,PO | Performed by: INTERNAL MEDICINE

## 2018-02-28 PROCEDURE — 99999 PR PBB SHADOW E&M-EST. PATIENT-LVL III: CPT | Mod: PBBFAC,,, | Performed by: INTERNAL MEDICINE

## 2018-02-28 PROCEDURE — 99214 OFFICE O/P EST MOD 30 MIN: CPT | Mod: S$PBB,,, | Performed by: INTERNAL MEDICINE

## 2018-02-28 RX ORDER — POTASSIUM CHLORIDE 20 MEQ/1
20 TABLET, EXTENDED RELEASE ORAL 2 TIMES DAILY
Qty: 60 TABLET | Refills: 11 | Status: SHIPPED | OUTPATIENT
Start: 2018-02-28 | End: 2019-03-02 | Stop reason: SDUPTHER

## 2018-02-28 RX ORDER — POTASSIUM CHLORIDE 20 MEQ/1
20 TABLET, EXTENDED RELEASE ORAL 2 TIMES DAILY
COMMUNITY
End: 2018-02-28 | Stop reason: SDUPTHER

## 2018-02-28 NOTE — PROGRESS NOTES
"HPI:  Patient is a 89-year-old man who comes in today for follow-up of his hypertension, lipids, coronary disease, severe COPD and for his annual physical.  His COPD remains about the same.  He is on 2-1/2 L to 3 L of supplemental oxygen.  His dyspnea on exertion is about the same.  He denies any chest pain at all.  His blood pressure readings at home and been excellent.  There have been no other new complaints    Current MEDS: medcard review, verified and update  Allergies: Per the electronic medical record    Past Medical History:   Diagnosis Date    BPH (benign prostatic hyperplasia)     Cataract     Chronic airway obstruction, not elsewhere classified     Coronary atherosclerosis of unspecified type of vessel, native or graft     Esophageal reflux     Essential hypertension     History of bladder cancer     History of colon cancer     Hyperlipidemia LDL goal <70     Impaired fasting glucose     Macular degeneration     Pulmonary hypertension 12/18/2015    TR (tricuspid regurgitation) 12/18/2015       Past Surgical History:   Procedure Laterality Date    BACK SURGERY      COLON SURGERY      CORONARY ARTERY BYPASS GRAFT      partial coloectomy for large polyp      turbt         SHx: per the electronic medical record    FHx: recorded in the electronic medical record    ROS:    denies any chest pains or shortness of breath. Denies any nausea, vomiting or diarrhea. Denies any fever, chills or sweats. Denies any change in weight, voice, stool, skin or hair. Denies any dysuria, dyspepsia or dysphagia. Denies any change in vision, hearing or headaches. Denies any swollen lymph nodes or loss of memory.    PE:  /84   Pulse 84   Temp 97.4 °F (36.3 °C)   Resp 16   Ht 5' 8" (1.727 m)   Wt 81.7 kg (180 lb 1.9 oz)   SpO2 98%   BMI 27.39 kg/m²   Gen: Well-developed, well-nourished, male, in no acute distress, oriented x3  HEENT: neck is supple, no adenopathy, carotids 2+ equal without bruits, " thyroid exam normal size without nodules.  CHEST: clear to auscultation and percussion  CVS: regular rate and rhythm without significant murmur, gallop, or rubs  ABD: soft, benign, no rebound no guarding, no distention.  Bowel sounds are normal.     nontender.  No palpable masses.  No organomegaly and no audible bruits.  RECTAL: Deferred  EXT: no clubbing, cyanosis, or edema  LYMPH: no cervical, inguinal, or axillary adenopathy  FEET: no loss of sensation.  No ulcers or pressure sores.  NEURO: gait normal.  Cranial nerves II- XII intact. No nystagmus.  Speech normal.   Gross motor and sensory unremarkable.    Lab Results   Component Value Date    WBC 12.52 02/21/2018    HGB 14.7 02/21/2018    HCT 43.9 02/21/2018     02/21/2018    CHOL 169 02/21/2018    TRIG 117 02/21/2018    HDL 48 02/21/2018    ALT 14 02/21/2018    AST 15 02/21/2018     02/21/2018    K 3.7 02/21/2018    CL 97 02/21/2018    CREATININE 0.9 02/21/2018    BUN 10 02/21/2018    CO2 33 (H) 02/21/2018    TSH 2.379 02/21/2018    PSA 3.8 08/15/2011    INR 1.0 12/08/2015    HGBA1C 6.1 08/10/2016       Impression:  Multiple medical problems below, stable  Patient Active Problem List   Diagnosis    Impaired fasting glucose    Essential hypertension    Esophageal reflux    History of colonic polyps    Lumbar radiculopathy    Age-related macular degeneration, wet, right eye    CAD in native artery    Mixed hyperlipidemia    Pulmonary hypertension    TR (tricuspid regurgitation)    Multifocal atrial tachycardia    COPD, severe    Gross hematuria    Exercise hypoxemia    Hypoxemia requiring supplemental oxygen    Anxiety    History of bladder cancer    History of colon cancer    Entropion of left eyelid    BPH (benign prostatic hyperplasia)       Plan:   Orders Placed This Encounter    Hemoglobin A1c    Comprehensive metabolic panel    Lipid panel    potassium chloride SA (K-DUR,KLOR-CON) 20 MEQ tablet     Medications remain the  same.  He'll be seen again in 6 months with above lab work.

## 2018-03-13 ENCOUNTER — OFFICE VISIT (OUTPATIENT)
Dept: OPHTHALMOLOGY | Facility: CLINIC | Age: 83
End: 2018-03-13
Payer: MEDICARE

## 2018-03-13 DIAGNOSIS — H35.3232 WET AGE-RELATED MACULAR DEGENERATION OF BOTH EYES WITH INACTIVE CHOROIDAL NEOVASCULARIZATION: Primary | ICD-10-CM

## 2018-03-13 PROCEDURE — 99999 PR PBB SHADOW E&M-EST. PATIENT-LVL II: CPT | Mod: PBBFAC,,, | Performed by: OPHTHALMOLOGY

## 2018-03-13 PROCEDURE — 92014 COMPRE OPH EXAM EST PT 1/>: CPT | Mod: S$PBB,,, | Performed by: OPHTHALMOLOGY

## 2018-03-13 PROCEDURE — 92134 CPTRZ OPH DX IMG PST SGM RTA: CPT | Mod: PBBFAC,PO | Performed by: OPHTHALMOLOGY

## 2018-03-13 PROCEDURE — 99212 OFFICE O/P EST SF 10 MIN: CPT | Mod: PBBFAC,PO,25 | Performed by: OPHTHALMOLOGY

## 2018-03-13 NOTE — PROGRESS NOTES
===============================  03/13/2018   Kelton Mcdaniels,   89 y.o. male   [facility] visit [facility]: :11/7/2017   [facility]pt[facility]:  Corrected distance visual acuity was 20/400 in the right eye and 20/40 in the left eye.  Tonometry     Tonometry (Applanation, 9:56 AM)       Right Left    Pressure 16 14               Not recorded         Not recorded        Chief Complaint   Patient presents with    Macular Degeneration     4 months check up        HPI     Macular Degeneration    Additional comments: 4 months check up           Comments   --Smd/srn od dx. 10/5/15   --2+ vac cat ou      EYLEA OD 1/20/17  H/o Avastin       Last edited by Megan Leong on 3/13/2018  9:49 AM. 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  Treated with Avastin then Eylea               .no bleeding  Central geographic and gliotic maculopathy OD  No red blood  rtc 1 year       ===========================

## 2018-03-14 ENCOUNTER — OFFICE VISIT (OUTPATIENT)
Dept: UROLOGY | Facility: CLINIC | Age: 83
End: 2018-03-14
Payer: MEDICARE

## 2018-03-14 VITALS
DIASTOLIC BLOOD PRESSURE: 82 MMHG | WEIGHT: 175 LBS | BODY MASS INDEX: 26.52 KG/M2 | HEART RATE: 67 BPM | HEIGHT: 68 IN | SYSTOLIC BLOOD PRESSURE: 148 MMHG

## 2018-03-14 DIAGNOSIS — N40.0 BENIGN PROSTATIC HYPERPLASIA, UNSPECIFIED WHETHER LOWER URINARY TRACT SYMPTOMS PRESENT: Primary | ICD-10-CM

## 2018-03-14 DIAGNOSIS — Z85.51 HISTORY OF BLADDER CANCER: ICD-10-CM

## 2018-03-14 DIAGNOSIS — I10 HYPERTENSION, UNSPECIFIED TYPE: ICD-10-CM

## 2018-03-14 LAB
BILIRUB SERPL-MCNC: NORMAL MG/DL
BLOOD URINE, POC: NORMAL
COLOR, POC UA: YELLOW
GLUCOSE UR QL STRIP: NORMAL
KETONES UR QL STRIP: NORMAL
LEUKOCYTE ESTERASE URINE, POC: NORMAL
NITRITE, POC UA: NORMAL
PH, POC UA: 7
POC RESIDUAL URINE VOLUME: 13 ML (ref 0–100)
PROTEIN, POC: NORMAL
SPECIFIC GRAVITY, POC UA: 1
UROBILINOGEN, POC UA: NORMAL

## 2018-03-14 PROCEDURE — 51798 US URINE CAPACITY MEASURE: CPT | Mod: PBBFAC | Performed by: UROLOGY

## 2018-03-14 PROCEDURE — 99999 PR PBB SHADOW E&M-EST. PATIENT-LVL II: CPT | Mod: PBBFAC,,, | Performed by: UROLOGY

## 2018-03-14 PROCEDURE — 81002 URINALYSIS NONAUTO W/O SCOPE: CPT | Mod: PBBFAC | Performed by: UROLOGY

## 2018-03-14 PROCEDURE — 99212 OFFICE O/P EST SF 10 MIN: CPT | Mod: PBBFAC,25 | Performed by: UROLOGY

## 2018-03-14 PROCEDURE — 99214 OFFICE O/P EST MOD 30 MIN: CPT | Mod: S$PBB,,, | Performed by: UROLOGY

## 2018-03-14 RX ORDER — TAMSULOSIN HYDROCHLORIDE 0.4 MG/1
0.4 CAPSULE ORAL DAILY
Qty: 90 CAPSULE | Refills: 3 | Status: SHIPPED | OUTPATIENT
Start: 2018-03-14 | End: 2018-09-05

## 2018-03-14 RX ORDER — FINASTERIDE 5 MG/1
5 TABLET, FILM COATED ORAL DAILY
Qty: 90 TABLET | Refills: 3 | Status: SHIPPED | OUTPATIENT
Start: 2018-03-14 | End: 2019-03-04 | Stop reason: SDUPTHER

## 2018-03-14 RX ORDER — TAMSULOSIN HYDROCHLORIDE 0.4 MG/1
0.4 CAPSULE ORAL DAILY
Qty: 90 CAPSULE | Refills: 3 | Status: SHIPPED | OUTPATIENT
Start: 2018-03-14 | End: 2018-03-14 | Stop reason: SDUPTHER

## 2018-03-14 NOTE — PROGRESS NOTES
Chief Complaint: Urinary retention    HPI:   3/14/18: Finds that he voids fine when he manages his constipation and all is well feels fine.  Taking flomax/finasteride.  Reviewed history in detail.  3/7/17: Went into retention last weekend and is here with a myles.  No sig clots.  No new meds. -UCx.   ml.  2/9/17: Other than a simple left renal cyst no findings on CT Urogram. Cysto normal except BPH no recurrence of bladder cancer.  1/31/17: 87 yo man last week had a day of dark blood with clots last week.  Was dx with bladder cancer 15+ years ago.  Some surveillance cystoscopy but none in the last 10 years. No abd/pelvic pain and no exac/rel factors.  No urolithiasis.  No urinary bother.  Had 6 wks BCG with a restaging resection after; maybe more BCG after that.  Was told to worry about it no more.  Also had a TURP in that same time frame.  And some gross hematuria attributed to his prostate in 2005.    Allergies:  No known allergies    Medications:  has a current medication list which includes the following prescription(s): albuterol, albuterol-ipratropium 2.5mg-0.5mg/3ml, alprazolam, carvedilol, doxycycline, finasteride, fluticasone-salmeterol 250-50 mcg/dose, hydrochlorothiazide, mucus clearing device, nitroglycerin, omeprazole, potassium chloride sa, roflumilast, simvastatin, tiotropium bromide, butenafine, gabapentin, and tamsulosin.    Review of Systems:  General: No fever, chills, fatigability, or weight loss.  Skin: No rashes, itching, or changes in color or texture of skin.  Chest: Denies CARBAJAL, cyanosis, wheezing, cough, and sputum production.  Abdomen: Appetite fine. No weight loss. Denies diarrhea, abdominal pain, hematemesis, or blood in stool.  Musculoskeletal: No joint stiffness or swelling. Denies back pain.  : As above.  All other review of systems negative.    PMH:   has a past medical history of BPH (benign prostatic hyperplasia); Cataract; Chronic airway obstruction, not elsewhere  classified; Coronary atherosclerosis of unspecified type of vessel, native or graft; Esophageal reflux; Essential hypertension; History of bladder cancer; History of colon cancer; Hyperlipidemia LDL goal <70; Impaired fasting glucose; Macular degeneration; Pulmonary hypertension (12/18/2015); and TR (tricuspid regurgitation) (12/18/2015).    PSH:   has a past surgical history that includes partial coloectomy for large polyp; Coronary artery bypass graft; Colon surgery; Back surgery; and turbt.    FamHx: family history includes Asthma in his paternal uncle; Cancer in his brother; Diabetes in his father; Heart failure in his mother.    SocHx:  reports that he has quit smoking. His smoking use included Cigarettes. He has a 140.00 pack-year smoking history. He has never used smokeless tobacco. He reports that he does not drink alcohol or use drugs.      Physical Exam:  Vitals:    03/14/18 0810   BP: (!) 148/82   Pulse: 67     General: A&Ox3, no apparent distress, no deformities  Neck: No masses, normal thyroid  Lungs: normal inspiration, no use of accessory muscles  Heart: normal pulse, no arrhythmias  Abdomen: Soft, NT, NDSkin: The skin is warm and dry. No jaundice.  Ext: No c/c/e.  :   2/17: test desc danyel, CARLEY normal    Bladder Scan performed in office: PVR 13 ml.    Impression/Plan:   1. Doing well, no hematuria in interval.  Refill flomax/finasteride.  Voiding fine. RTC 1 year.  2. HTN: discussed and referred to PCP for further management.

## 2018-03-20 ENCOUNTER — PATIENT MESSAGE (OUTPATIENT)
Dept: INTERNAL MEDICINE | Facility: CLINIC | Age: 83
End: 2018-03-20

## 2018-03-20 ENCOUNTER — TELEPHONE (OUTPATIENT)
Dept: INTERNAL MEDICINE | Facility: CLINIC | Age: 83
End: 2018-03-20

## 2018-03-20 NOTE — TELEPHONE ENCOUNTER
Message left for patient that he is to take two potassium tablets per day. Patient is to call back and  Confirm he received the instructions.

## 2018-03-20 NOTE — TELEPHONE ENCOUNTER
----- Message from Landy Barnes sent at 3/20/2018  2:01 PM CDT -----  Contact: Patient  Patient wants to be sure that he is suppose to take 2 potassium chloride tablets daily, states he used to take only one. Please call  Him back at [phone]. Thank you

## 2018-05-15 ENCOUNTER — TELEPHONE (OUTPATIENT)
Dept: PULMONOLOGY | Facility: CLINIC | Age: 83
End: 2018-05-15

## 2018-05-16 ENCOUNTER — OFFICE VISIT (OUTPATIENT)
Dept: PULMONOLOGY | Facility: CLINIC | Age: 83
End: 2018-05-16
Payer: MEDICARE

## 2018-05-16 ENCOUNTER — PROCEDURE VISIT (OUTPATIENT)
Dept: PULMONOLOGY | Facility: CLINIC | Age: 83
End: 2018-05-16
Payer: MEDICARE

## 2018-05-16 ENCOUNTER — HOSPITAL ENCOUNTER (OUTPATIENT)
Dept: RADIOLOGY | Facility: HOSPITAL | Age: 83
Discharge: HOME | End: 2018-05-16
Attending: INTERNAL MEDICINE
Payer: MEDICARE

## 2018-05-16 VITALS
BODY MASS INDEX: 27.13 KG/M2 | RESPIRATION RATE: 18 BRPM | OXYGEN SATURATION: 92 % | DIASTOLIC BLOOD PRESSURE: 60 MMHG | WEIGHT: 179 LBS | HEART RATE: 78 BPM | HEIGHT: 68 IN | SYSTOLIC BLOOD PRESSURE: 118 MMHG

## 2018-05-16 DIAGNOSIS — J44.9 CHRONIC OBSTRUCTIVE PULMONARY DISEASE, UNSPECIFIED COPD TYPE: Primary | ICD-10-CM

## 2018-05-16 DIAGNOSIS — J44.9 COPD, SEVERE: Chronic | ICD-10-CM

## 2018-05-16 DIAGNOSIS — J44.9 COPD, SEVERE: Primary | Chronic | ICD-10-CM

## 2018-05-16 DIAGNOSIS — I27.20 PULMONARY HYPERTENSION: ICD-10-CM

## 2018-05-16 DIAGNOSIS — R09.02 EXERCISE HYPOXEMIA: Chronic | ICD-10-CM

## 2018-05-16 DIAGNOSIS — R09.02 HYPOXEMIA REQUIRING SUPPLEMENTAL OXYGEN: ICD-10-CM

## 2018-05-16 DIAGNOSIS — Z99.81 HYPOXEMIA REQUIRING SUPPLEMENTAL OXYGEN: ICD-10-CM

## 2018-05-16 PROCEDURE — 99214 OFFICE O/P EST MOD 30 MIN: CPT | Mod: PBBFAC,25 | Performed by: INTERNAL MEDICINE

## 2018-05-16 PROCEDURE — 94010 BREATHING CAPACITY TEST: CPT | Mod: 26,S$PBB,, | Performed by: INTERNAL MEDICINE

## 2018-05-16 PROCEDURE — 71046 X-RAY EXAM CHEST 2 VIEWS: CPT | Mod: 26,,, | Performed by: RADIOLOGY

## 2018-05-16 PROCEDURE — 71046 X-RAY EXAM CHEST 2 VIEWS: CPT | Mod: TC

## 2018-05-16 PROCEDURE — 99999 PR PBB SHADOW E&M-EST. PATIENT-LVL IV: CPT | Mod: PBBFAC,,, | Performed by: INTERNAL MEDICINE

## 2018-05-16 PROCEDURE — 99214 OFFICE O/P EST MOD 30 MIN: CPT | Mod: 25,S$PBB,, | Performed by: INTERNAL MEDICINE

## 2018-05-16 PROCEDURE — 94010 BREATHING CAPACITY TEST: CPT | Mod: PBBFAC

## 2018-05-16 RX ORDER — AZITHROMYCIN 250 MG/1
TABLET, FILM COATED ORAL
Qty: 6 TABLET | Refills: 2 | Status: SHIPPED | OUTPATIENT
Start: 2018-05-16 | End: 2018-11-21 | Stop reason: SDUPTHER

## 2018-05-16 RX ORDER — METHYLPREDNISOLONE 4 MG/1
TABLET ORAL
Qty: 1 PACKAGE | Refills: 2 | Status: SHIPPED | OUTPATIENT
Start: 2018-05-16 | End: 2018-11-21

## 2018-05-16 NOTE — PROGRESS NOTES
"Subjective:       Patient ID: Kelton Mcdaniels is a 89 y.o. male.    Chief Complaint: COPD, severe    Mr. Kelton Mcdaniels is 89 years old  He has severe COPD with an FEV1 of 0.76 (35% predicted)  Exertional dyspnea, No cough nasal congestion  No reportable recent exacebations needing steriods  MRC grade score is 2, able to move trash to curb side  COPD test score is 19  Medications have been reviewed he looks like he is doing much better    Medications reviewed Daliresp, Advair, DuoNeb and Spiriva Respimat daily   He is on oxygen supplement to the 2.0  L/m    Patient benefited from pulmonary rehabilitation  His anxiety is much better controlled with the when necessary use of the Xanax  Using self coping skills  Chest x-ray was reviewed is clear hyperinflation noted  Plans to travel to [place] in June        Review of Systems   Constitutional: Positive for fatigue.   HENT: Negative.    Eyes: Negative.    Respiratory: Negative for snoring, sputum production, shortness of breath, wheezing and use of rescue inhaler.    Cardiovascular: Negative.    Genitourinary: Negative.    Endocrine: endocrine negative   Musculoskeletal: Negative.    Skin: Negative.         Easily bruises   Gastrointestinal: Negative.    Neurological: Negative.    Psychiatric/Behavioral: Negative.        Objective:       Vitals:    05/16/18 1338   BP: 118/60   Pulse: 78   Resp: 18   SpO2: (!) 92%  Comment: 3 liters   Weight: 81.2 kg (179 lb)   Height: 5' 8" (1.727 m)     Physical Exam   Constitutional: He is oriented to person, place, and time. He appears well-nourished. He appears not cachectic.   HENT:   Head: Normocephalic.   Nose: Nose normal.   Mouth/Throat: No oropharyngeal exudate.   Neck: Normal range of motion. Neck supple. No tracheal deviation present. No thyromegaly present.   Cardiovascular: Normal rate, regular rhythm and normal heart sounds.    No murmur heard.  Pulmonary/Chest: Normal expansion and hyperinflation. He has decreased breath " sounds. He has no wheezes. He has no rhonchi. Negative for tactile fremitus.   Abdominal: Soft. Bowel sounds are normal.   Lymphadenopathy:     He has no cervical adenopathy.   Neurological: He is alert and oriented to person, place, and time.   Skin: Skin is dry. There is erythema. No cyanosis. Nails show no clubbing.   Psychiatric: He has a normal mood and affect. His behavior is normal.   Nursing note and vitals reviewed.    Personal Diagnostic Review      Pulmonary function tests: FEV1: 0.76  (35 % predicted), FVC:  2.26 (69 % predicted), FEV1/FVC:  34    CXR: hyperinflation   Tortuosity of the thoracic aorta again noted.  Sternotomy wires again noted.  Lungs remain hyperinflated with chronic blunting of the bilateral costophrenic angles.  No definite parenchymal consolidation or pleural effusion visualized.  No appreciable change from prior.  No acute osseous findings demonstrated.  Dystrophic calcifications again noted in the right supraclavicular region.  No flowsheet data found.      Assessment:       Problem List Items Addressed This Visit     COPD, severe - Primary (Chronic)     COPD ROS: taking medications as instructed, no medication side effects noted, no significant ongoing wheezing or shortness of breath, using bronchodilator MDI less than twice a week.     Mediations: Proair HFA, Tiotropium bromide, Accapella, Daliresp, Advair Diskus  nebuliser DUONEB PRN  CAT score 19.  FEV1 : 0.76  L( 35%), FEV1/FVC 34  mRC score 2     New concerns: None.      Exam: appears well, vitals normal, no respiratory distress, acyanotic, normal RR, chest clear, no wheezing, crepitations, rhonchi, normal symmetric air entry.      Assessment: COPD reasonably well controlled.     Plan: current treatment plan is effective, no change in therapy.        COPD sick plan Z gema and medrol since travelling to [place] June 2nd  Ayr nasal gel         Relevant Medications    azithromycin (Z-GEMA) 250 MG tablet    methylPREDNISolone  (MEDROL DOSEPACK) 4 mg tablet    Exercise hypoxemia (Chronic)    Pulmonary hypertension     Group 3 PH  PASP 37.7         Hypoxemia requiring supplemental oxygen     HMS DME   2.0 LPM             Plan:       COPD sick plan meds  Overall COPD stable.    Follow-up in about 6 months (around 11/16/2018) for Spirometry and CXR next visit.    This note was prepared using voice recognition system and is likely to have sound alike errors that may have been overlooked even after proof reading.  Please call me with any questions    Discussed diagnosis, its evaluation, treatment and usual course. All questions answered.    Thank you for the courtesy of participating in the care of this patient    Vinicius Cat MD

## 2018-05-16 NOTE — ASSESSMENT & PLAN NOTE
COPD ROS: taking medications as instructed, no medication side effects noted, no significant ongoing wheezing or shortness of breath, using bronchodilator MDI less than twice a week.     Mediations: Proair HFA, Tiotropium bromide, Accapella, Daliresp, Advair Diskus  nebuliser DUONEB PRN  CAT score 19.  FEV1 : 0.76  L( 35%), FEV1/FVC 34  mRC score 2     New concerns: None.      Exam: appears well, vitals normal, no respiratory distress, acyanotic, normal RR, chest clear, no wheezing, crepitations, rhonchi, normal symmetric air entry.      Assessment: COPD reasonably well controlled.     Plan: current treatment plan is effective, no change in therapy.        COPD sick plan LAYO herman and medrol since travelling to [place] June 2nd  Ayr nasal gel

## 2018-05-17 LAB
BRPFT: ABNORMAL
FEF 25 75 PRE: 0.25 L/S
FET100 PRE: 10.28 SEC
FEV1 FVC LLN: 58
FEV1 FVC PRE REF: 45.7 %
FEV1 FVC PRE: 33.76 % (ref 57.64–90)
FEV1 FVC REF: 74
FEV1 LLN: 1.62
FEV1 PRE REF: 31.3 %
FEV1 PRE: 0.76 L (ref 1.62–3.26)
FEV1 REF: 2.44
FEV6 LLN: 2.13
FEV6 PRE REF: 60.4 %
FEV6 PRE: 1.81 L (ref 2.13–3.85)
FEV6 REF: 2.99
FIF50 PRE: 1.86 L/S
FVC LLN: 2.35
FVC PRE REF: 67.5 %
FVC PRE: 2.26 L (ref 2.35–4.35)
FVC REF: 3.35
ISOFEF PRE: 0.25 L/S
MVV LLN: 80
MVV REF: 94
PEF LLN: 3.24
PEF PRE REF: 64.6 %
PEF PRE: 3.51 L/S (ref 3.24–7.62)
PEF REF: 5.43

## 2018-07-03 ENCOUNTER — TELEPHONE (OUTPATIENT)
Dept: INTERNAL MEDICINE | Facility: CLINIC | Age: 83
End: 2018-07-03

## 2018-07-03 NOTE — TELEPHONE ENCOUNTER
----- Message from Evy Song sent at 7/3/2018  2:43 PM CDT -----  Contact: Patient   Patient would like a call back at [phone], Regards to his records.    Thanks  Td

## 2018-07-22 DIAGNOSIS — K21.9 GASTROESOPHAGEAL REFLUX DISEASE, ESOPHAGITIS PRESENCE NOT SPECIFIED: ICD-10-CM

## 2018-07-22 RX ORDER — OMEPRAZOLE 20 MG/1
CAPSULE, DELAYED RELEASE ORAL
Qty: 90 CAPSULE | Refills: 3 | Status: SHIPPED | OUTPATIENT
Start: 2018-07-22 | End: 2019-07-22 | Stop reason: SDUPTHER

## 2018-08-09 ENCOUNTER — PATIENT MESSAGE (OUTPATIENT)
Dept: INTERNAL MEDICINE | Facility: CLINIC | Age: 83
End: 2018-08-09

## 2018-08-09 ENCOUNTER — TELEPHONE (OUTPATIENT)
Dept: INTERNAL MEDICINE | Facility: CLINIC | Age: 83
End: 2018-08-09

## 2018-08-09 NOTE — TELEPHONE ENCOUNTER
----- Message from Emre Sibley sent at 8/9/2018  1:11 PM CDT -----  Contact: Kelton [phone]  Patient is requesting a call back from the nurse in regards to [facility]

## 2018-08-09 NOTE — TELEPHONE ENCOUNTER
Spoke with the patient and informed the patient that medical records was contacted and the release was faxed over and that they would contact him when the records were ready, patient verbalized understanding.

## 2018-08-28 ENCOUNTER — PATIENT MESSAGE (OUTPATIENT)
Dept: INTERNAL MEDICINE | Facility: CLINIC | Age: 83
End: 2018-08-28

## 2018-08-28 ENCOUNTER — LAB VISIT (OUTPATIENT)
Dept: LAB | Facility: HOSPITAL | Age: 83
End: 2018-08-28
Attending: INTERNAL MEDICINE
Payer: MEDICARE

## 2018-08-28 DIAGNOSIS — R73.01 IMPAIRED FASTING GLUCOSE: ICD-10-CM

## 2018-08-28 DIAGNOSIS — I10 ESSENTIAL HYPERTENSION: ICD-10-CM

## 2018-08-28 LAB
ALBUMIN SERPL BCP-MCNC: 4 G/DL
ALP SERPL-CCNC: 58 U/L
ALT SERPL W/O P-5'-P-CCNC: 15 U/L
ANION GAP SERPL CALC-SCNC: 7 MMOL/L
AST SERPL-CCNC: 16 U/L
BILIRUB SERPL-MCNC: 0.7 MG/DL
BUN SERPL-MCNC: 13 MG/DL
CALCIUM SERPL-MCNC: 9.6 MG/DL
CHLORIDE SERPL-SCNC: 99 MMOL/L
CHOLEST SERPL-MCNC: 164 MG/DL
CHOLEST/HDLC SERPL: 3.5 {RATIO}
CO2 SERPL-SCNC: 33 MMOL/L
CREAT SERPL-MCNC: 0.8 MG/DL
EST. GFR  (AFRICAN AMERICAN): >60 ML/MIN/1.73 M^2
EST. GFR  (NON AFRICAN AMERICAN): >60 ML/MIN/1.73 M^2
ESTIMATED AVG GLUCOSE: 117 MG/DL
GLUCOSE SERPL-MCNC: 116 MG/DL
HBA1C MFR BLD HPLC: 5.7 %
HDLC SERPL-MCNC: 47 MG/DL
HDLC SERPL: 28.7 %
LDLC SERPL CALC-MCNC: 97 MG/DL
NONHDLC SERPL-MCNC: 117 MG/DL
POTASSIUM SERPL-SCNC: 4 MMOL/L
PROT SERPL-MCNC: 7.1 G/DL
SODIUM SERPL-SCNC: 139 MMOL/L
TRIGL SERPL-MCNC: 100 MG/DL

## 2018-08-28 PROCEDURE — 80053 COMPREHEN METABOLIC PANEL: CPT

## 2018-08-28 PROCEDURE — 80061 LIPID PANEL: CPT

## 2018-08-28 PROCEDURE — 36415 COLL VENOUS BLD VENIPUNCTURE: CPT | Mod: PO

## 2018-08-28 PROCEDURE — 83036 HEMOGLOBIN GLYCOSYLATED A1C: CPT

## 2018-08-28 NOTE — TELEPHONE ENCOUNTER
Have the patient check his blood pressure daily for the next 4-5 days and if persistently elevated, he needs to make an appointment to be seen

## 2018-09-04 RX ORDER — SIMVASTATIN 40 MG/1
TABLET, FILM COATED ORAL
Qty: 90 TABLET | Refills: 3 | Status: SHIPPED | OUTPATIENT
Start: 2018-09-04 | End: 2019-09-17 | Stop reason: SDUPTHER

## 2018-09-05 ENCOUNTER — OFFICE VISIT (OUTPATIENT)
Dept: INTERNAL MEDICINE | Facility: CLINIC | Age: 83
End: 2018-09-05
Payer: MEDICARE

## 2018-09-05 VITALS
OXYGEN SATURATION: 95 % | DIASTOLIC BLOOD PRESSURE: 84 MMHG | HEART RATE: 84 BPM | HEIGHT: 67 IN | SYSTOLIC BLOOD PRESSURE: 134 MMHG | RESPIRATION RATE: 16 BRPM | TEMPERATURE: 97 F | WEIGHT: 186.06 LBS | BODY MASS INDEX: 29.2 KG/M2

## 2018-09-05 DIAGNOSIS — I10 ESSENTIAL HYPERTENSION: ICD-10-CM

## 2018-09-05 DIAGNOSIS — R73.01 IMPAIRED FASTING GLUCOSE: ICD-10-CM

## 2018-09-05 DIAGNOSIS — E78.2 MIXED HYPERLIPIDEMIA: Primary | ICD-10-CM

## 2018-09-05 DIAGNOSIS — I25.10 CAD IN NATIVE ARTERY: ICD-10-CM

## 2018-09-05 PROCEDURE — 99999 PR PBB SHADOW E&M-EST. PATIENT-LVL III: CPT | Mod: PBBFAC,,, | Performed by: INTERNAL MEDICINE

## 2018-09-05 PROCEDURE — 99214 OFFICE O/P EST MOD 30 MIN: CPT | Mod: S$PBB,,, | Performed by: INTERNAL MEDICINE

## 2018-09-05 PROCEDURE — 99213 OFFICE O/P EST LOW 20 MIN: CPT | Mod: PBBFAC,PO | Performed by: INTERNAL MEDICINE

## 2018-09-05 RX ORDER — TAMSULOSIN HYDROCHLORIDE 0.4 MG/1
CAPSULE ORAL
COMMUNITY
Start: 2018-09-04 | End: 2020-03-09 | Stop reason: SDUPTHER

## 2018-10-14 DIAGNOSIS — J44.9 COPD, SEVERE: Chronic | ICD-10-CM

## 2018-10-14 RX ORDER — FLUTICASONE PROPIONATE AND SALMETEROL 250; 50 UG/1; UG/1
POWDER RESPIRATORY (INHALATION)
Qty: 60 EACH | Refills: 11 | Status: SHIPPED | OUTPATIENT
Start: 2018-10-14 | End: 2020-02-04

## 2018-11-21 ENCOUNTER — CLINICAL SUPPORT (OUTPATIENT)
Dept: PULMONOLOGY | Facility: CLINIC | Age: 83
End: 2018-11-21
Payer: MEDICARE

## 2018-11-21 ENCOUNTER — OFFICE VISIT (OUTPATIENT)
Dept: PULMONOLOGY | Facility: CLINIC | Age: 83
End: 2018-11-21
Payer: MEDICARE

## 2018-11-21 ENCOUNTER — HOSPITAL ENCOUNTER (OUTPATIENT)
Dept: RADIOLOGY | Facility: HOSPITAL | Age: 83
Discharge: HOME | End: 2018-11-21
Attending: INTERNAL MEDICINE
Payer: MEDICARE

## 2018-11-21 VITALS
BODY MASS INDEX: 28.41 KG/M2 | RESPIRATION RATE: 18 BRPM | HEART RATE: 83 BPM | WEIGHT: 181 LBS | OXYGEN SATURATION: 96 % | HEIGHT: 67 IN | SYSTOLIC BLOOD PRESSURE: 125 MMHG | DIASTOLIC BLOOD PRESSURE: 70 MMHG

## 2018-11-21 DIAGNOSIS — J44.9 COPD, SEVERE: Primary | Chronic | ICD-10-CM

## 2018-11-21 DIAGNOSIS — I27.20 PULMONARY HYPERTENSION: ICD-10-CM

## 2018-11-21 DIAGNOSIS — J44.9 CHRONIC OBSTRUCTIVE PULMONARY DISEASE, UNSPECIFIED COPD TYPE: ICD-10-CM

## 2018-11-21 DIAGNOSIS — R09.02 HYPOXEMIA REQUIRING SUPPLEMENTAL OXYGEN: ICD-10-CM

## 2018-11-21 DIAGNOSIS — R09.02 EXERCISE HYPOXEMIA: Chronic | ICD-10-CM

## 2018-11-21 DIAGNOSIS — Z99.81 HYPOXEMIA REQUIRING SUPPLEMENTAL OXYGEN: ICD-10-CM

## 2018-11-21 LAB
BRPFT: ABNORMAL
FEV1 FVC LLN: 58
FEV1 FVC PRE REF: 49.1 %
FEV1 FVC REF: 74
FEV1 LLN: 1.55
FEV1 PRE REF: 28.9 %
FEV1 REF: 2.34
FEV6 LLN: 1.98
FEV6 PRE REF: 56.3 %
FEV6 PRE: 1.58 L (ref 1.98–3.64)
FEV6 REF: 2.81
FVC LLN: 2.25
FVC PRE REF: 58.2 %
FVC REF: 3.21
MVV LLN: 75
MVV REF: 89
PEF LLN: 3.07
PEF PRE REF: 55.1 %
PEF REF: 5.19
PRE FEF 25 75: 0.23 L/S
PRE FET 100: 8.5 SEC
PRE FEV1 FVC: 36.28 % (ref 57.67–89.14)
PRE FEV1: 0.68 L (ref 1.55–3.07)
PRE FVC: 1.87 L (ref 2.25–4.19)
PRE PEF: 2.86 L/S (ref 3.07–7.31)

## 2018-11-21 PROCEDURE — 94010 BREATHING CAPACITY TEST: CPT | Mod: PBBFAC

## 2018-11-21 PROCEDURE — 99999 PR PBB SHADOW E&M-EST. PATIENT-LVL III: CPT | Mod: PBBFAC,,, | Performed by: INTERNAL MEDICINE

## 2018-11-21 PROCEDURE — 71046 X-RAY EXAM CHEST 2 VIEWS: CPT | Mod: 26,,, | Performed by: RADIOLOGY

## 2018-11-21 PROCEDURE — 94010 BREATHING CAPACITY TEST: CPT | Mod: 26,S$PBB,, | Performed by: INTERNAL MEDICINE

## 2018-11-21 PROCEDURE — 99213 OFFICE O/P EST LOW 20 MIN: CPT | Mod: PBBFAC,25 | Performed by: INTERNAL MEDICINE

## 2018-11-21 PROCEDURE — 90662 IIV NO PRSV INCREASED AG IM: CPT | Mod: PBBFAC

## 2018-11-21 PROCEDURE — 71046 X-RAY EXAM CHEST 2 VIEWS: CPT | Mod: TC

## 2018-11-21 PROCEDURE — 99214 OFFICE O/P EST MOD 30 MIN: CPT | Mod: 25,S$PBB,, | Performed by: INTERNAL MEDICINE

## 2018-11-21 RX ORDER — AZITHROMYCIN 250 MG/1
TABLET, FILM COATED ORAL
Qty: 6 TABLET | Refills: 2 | Status: SHIPPED | OUTPATIENT
Start: 2018-11-21 | End: 2019-03-20

## 2018-11-21 RX ORDER — METHYLPREDNISOLONE 4 MG/1
TABLET ORAL
Qty: 1 PACKAGE | Refills: 2 | Status: SHIPPED | OUTPATIENT
Start: 2018-11-21 | End: 2019-03-20

## 2018-11-21 RX ORDER — LISINOPRIL 40 MG/1
40 TABLET ORAL
COMMUNITY

## 2018-11-21 NOTE — PROGRESS NOTES
"Subjective:       Patient ID: Kelton Mcdaniels is a 89 y.o. male.    Chief Complaint: COPD    Mr. Kelton Mcdaniels is 89 years old  He has severe COPD with an FEV1 of 0.68 (28.2% predicted)  Chronic respiratory failure on Oxygen  Exertional dyspnea, occasional  cough nasal congestion  Taking Sinnex OTC  No fever  No reportable recent exacebations needing steriods  MRC grade score is 2, able to move trash to curb side  COPD test score is 5  Medications reviewed Daliresp, Advair, DuoNeb and Spiriva Respimat daily   He is on oxygen supplement to the 2.0  L/m  His anxiety is much better controlled no longer use of the Xanax  Using self coping skills  Chest x-ray was reviewed is clear hyperinflation noted           Review of Systems   Constitutional: Positive for fatigue.   HENT: Negative.    Eyes: Negative.    Respiratory: Positive for cough. Negative for snoring, sputum production, shortness of breath, wheezing and use of rescue inhaler.    Cardiovascular: Negative.    Genitourinary: Negative.    Endocrine: endocrine negative   Musculoskeletal: Negative.    Skin: Negative.         Easily bruises   Gastrointestinal: Negative.    Neurological: Negative.    Psychiatric/Behavioral: Negative.        Objective:       Vitals:    11/21/18 0924   BP: 125/70   Pulse: 83   Resp: 18   SpO2: 96%  Comment: 3 liters DME?   Weight: 82.1 kg (181 lb)   Height: 5' 7" (1.702 m)     Physical Exam   Constitutional: He is oriented to person, place, and time. Vital signs are normal. He appears well-nourished. He appears not cachectic. Nasal cannula in place.   HENT:   Head: Normocephalic.   Nose: Nose normal.   Mouth/Throat: No oropharyngeal exudate.   Neck: Normal range of motion. Neck supple. No tracheal deviation present. No thyromegaly present.   Cardiovascular: Normal rate, regular rhythm and normal heart sounds.   No murmur heard.  Pulmonary/Chest: Normal expansion and hyperinflation. He has decreased breath sounds. He has no wheezes. He has " no rhonchi. Negative for tactile fremitus.   Abdominal: Soft. Bowel sounds are normal.   Lymphadenopathy:     He has no cervical adenopathy.   Neurological: He is alert and oriented to person, place, and time.   Skin: Skin is dry. No cyanosis or erythema. Nails show no clubbing.   Psychiatric: He has a normal mood and affect. His behavior is normal.   Nursing note and vitals reviewed.    Personal Diagnostic Review      Pulmonary function tests: FEV1: 0.68 (28.9 % predicted), FVC:  1.87 (58.2 % predicted), FEV1/FVC:  37    CXR:   Lungs are hyperinflated with straightening hemidiaphragms as can be seen with COPD.  No focal pulmonary consolidation.  Aortic atherosclerosis and tortuosity is again seen.  Cardiomediastinal silhouette is stable in appearance.  Dystrophic calcifications in the right supraclavicular region are present.  There are degenerative changes of the spine.  Median sternotomy wires are again visualized.  No flowsheet data found.      Assessment:       Problem List Items Addressed This Visit     COPD, severe - Primary (Chronic)     COPD ROS: taking medications as instructed, no medication side effects noted, no significant ongoing wheezing or shortness of breath, using bronchodilator MDI less than twice a week.      Mediations: Proair HFA, Tiotropium bromide, Accapella, Daliresp, Advair Diskus  nebuliser DUONEB PRN  CAT score 5.  FEV1 : 0.68  L(  28.9%), FEV1/FVC 36  mRC score 2     New concerns: None.      Exam: appears well, vitals normal, no respiratory distress, acyanotic, normal RR, chest clear, no wheezing, crepitations, rhonchi, normal symmetric air entry.      Assessment: COPD reasonably well controlled.     Plan: current treatment plan is effective, no change in therapy.         COPD sick plan Z gema and medrol           Relevant Medications    azithromycin (Z-GEMA) 250 MG tablet    methylPREDNISolone (MEDROL DOSEPACK) 4 mg tablet    Other Relevant Orders    X-Ray Chest PA And Lateral     Spirometry with/without bronchodilator    Exercise hypoxemia (Chronic)     DME:  Oxygen adherent 2.0 LPM         Pulmonary hypertension     Group 3 PH  PASP 37.7         Hypoxemia requiring supplemental oxygen     HMS DME   2.0 LPM             Plan:       COPD sick plan meds  Overall COPD stable.    Follow-up in about 6 months (around 5/21/2019), or flu shot, for Spirometry and CXR next visit.    This note was prepared using voice recognition system and is likely to have sound alike errors that may have been overlooked even after proof reading.  Please call me with any questions    Discussed diagnosis, its evaluation, treatment and usual course. All questions answered.    Thank you for the courtesy of participating in the care of this patient    Vinicius Cat MD

## 2018-11-21 NOTE — ASSESSMENT & PLAN NOTE
COPD ROS: taking medications as instructed, no medication side effects noted, no significant ongoing wheezing or shortness of breath, using bronchodilator MDI less than twice a week.      Mediations: Proair HFA, Tiotropium bromide, Accapella, Daliresp, Advair Diskus  nebuliser DUONEB PRN  CAT score 5.  FEV1 : 0.68  L( 28.9%), FEV1/FVC 36  mRC score 2     New concerns: None.      Exam: appears well, vitals normal, no respiratory distress, acyanotic, normal RR, chest clear, no wheezing, crepitations, rhonchi, normal symmetric air entry.      Assessment: COPD reasonably well controlled.     Plan: current treatment plan is effective, no change in therapy.         COPD sick plan LAYO herman and medrol

## 2019-01-23 ENCOUNTER — OFFICE VISIT (OUTPATIENT)
Dept: INTERNAL MEDICINE | Facility: CLINIC | Age: 84
End: 2019-01-23
Payer: MEDICARE

## 2019-01-23 VITALS
BODY MASS INDEX: 29.69 KG/M2 | TEMPERATURE: 99 F | WEIGHT: 189.13 LBS | HEIGHT: 67 IN | OXYGEN SATURATION: 92 % | DIASTOLIC BLOOD PRESSURE: 72 MMHG | SYSTOLIC BLOOD PRESSURE: 128 MMHG | HEART RATE: 99 BPM

## 2019-01-23 DIAGNOSIS — K43.2 INCISIONAL HERNIA, WITHOUT OBSTRUCTION OR GANGRENE: Primary | ICD-10-CM

## 2019-01-23 PROCEDURE — 99213 OFFICE O/P EST LOW 20 MIN: CPT | Mod: S$PBB,,, | Performed by: INTERNAL MEDICINE

## 2019-01-23 PROCEDURE — 99214 OFFICE O/P EST MOD 30 MIN: CPT | Mod: PBBFAC,PO | Performed by: INTERNAL MEDICINE

## 2019-01-23 PROCEDURE — 99999 PR PBB SHADOW E&M-EST. PATIENT-LVL IV: ICD-10-PCS | Mod: PBBFAC,,, | Performed by: INTERNAL MEDICINE

## 2019-01-23 PROCEDURE — 99213 PR OFFICE/OUTPT VISIT, EST, LEVL III, 20-29 MIN: ICD-10-PCS | Mod: S$PBB,,, | Performed by: INTERNAL MEDICINE

## 2019-01-23 PROCEDURE — 99999 PR PBB SHADOW E&M-EST. PATIENT-LVL IV: CPT | Mod: PBBFAC,,, | Performed by: INTERNAL MEDICINE

## 2019-01-23 NOTE — PROGRESS NOTES
"HPI:  Patient is a 89-year-old man who comes today with complaints of a mass in his middle abdominal area.  He denies any pain or discomfort with it.    Current meds have been verified and updated per the EMR  Exam:/72   Pulse 99   Temp 98.6 °F (37 °C)   Ht 5' 7" (1.702 m)   Wt 85.8 kg (189 lb 2.5 oz)   SpO2 (!) 92% Comment: 3 L oxygen  BMI 29.63 kg/m²   He has a small 2 cm incisional hernia in his mid abdominal wall just above the umbilicus.    Lab Results   Component Value Date    WBC 12.52 02/21/2018    HGB 14.7 02/21/2018    HCT 43.9 02/21/2018     02/21/2018    CHOL 164 08/28/2018    TRIG 100 08/28/2018    HDL 47 08/28/2018    ALT 15 08/28/2018    AST 16 08/28/2018     08/28/2018    K 4.0 08/28/2018    CL 99 08/28/2018    CREATININE 0.8 08/28/2018    BUN 13 08/28/2018    CO2 33 (H) 08/28/2018    TSH 2.379 02/21/2018    PSA 3.8 08/15/2011    INR 1.0 12/08/2015    HGBA1C 5.7 (H) 08/28/2018       Impression:  Incisional hernia  Patient Active Problem List   Diagnosis    Impaired fasting glucose    Essential hypertension    Esophageal reflux    History of colonic polyps    Lumbar radiculopathy    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  Patient was told he needs to leave it alone.  He should not have any surgical intervention at this time.    "

## 2019-02-02 DIAGNOSIS — I10 ESSENTIAL HYPERTENSION: ICD-10-CM

## 2019-02-03 RX ORDER — HYDROCHLOROTHIAZIDE 25 MG/1
TABLET ORAL
Qty: 90 TABLET | Refills: 0 | Status: SHIPPED | OUTPATIENT
Start: 2019-02-03 | End: 2019-05-03 | Stop reason: SDUPTHER

## 2019-02-04 ENCOUNTER — TELEPHONE (OUTPATIENT)
Dept: INTERNAL MEDICINE | Facility: CLINIC | Age: 84
End: 2019-02-04

## 2019-03-02 DIAGNOSIS — J44.9 COPD, SEVERE: Chronic | ICD-10-CM

## 2019-03-02 DIAGNOSIS — I25.10 CAD IN NATIVE ARTERY: ICD-10-CM

## 2019-03-02 RX ORDER — CARVEDILOL 25 MG/1
TABLET ORAL
Qty: 60 TABLET | Refills: 11 | Status: SHIPPED | OUTPATIENT
Start: 2019-03-02 | End: 2020-10-07 | Stop reason: SDUPTHER

## 2019-03-02 RX ORDER — POTASSIUM CHLORIDE 1500 MG/1
TABLET, EXTENDED RELEASE ORAL
Qty: 60 TABLET | Refills: 11 | Status: SHIPPED | OUTPATIENT
Start: 2019-03-02 | End: 2020-04-06 | Stop reason: SDUPTHER

## 2019-03-02 RX ORDER — TIOTROPIUM BROMIDE INHALATION SPRAY 3.12 UG/1
SPRAY, METERED RESPIRATORY (INHALATION)
Qty: 4 G | Refills: 11 | Status: SHIPPED | OUTPATIENT
Start: 2019-03-02 | End: 2020-04-27

## 2019-03-04 ENCOUNTER — OFFICE VISIT (OUTPATIENT)
Dept: UROLOGY | Facility: CLINIC | Age: 84
End: 2019-03-04
Payer: MEDICARE

## 2019-03-04 VITALS
HEART RATE: 80 BPM | WEIGHT: 183 LBS | BODY MASS INDEX: 28.72 KG/M2 | DIASTOLIC BLOOD PRESSURE: 72 MMHG | SYSTOLIC BLOOD PRESSURE: 138 MMHG | HEIGHT: 67 IN

## 2019-03-04 DIAGNOSIS — N40.0 BENIGN PROSTATIC HYPERPLASIA, UNSPECIFIED WHETHER LOWER URINARY TRACT SYMPTOMS PRESENT: Primary | ICD-10-CM

## 2019-03-04 DIAGNOSIS — K59.00 CONSTIPATION, UNSPECIFIED CONSTIPATION TYPE: ICD-10-CM

## 2019-03-04 PROCEDURE — 99214 PR OFFICE/OUTPT VISIT, EST, LEVL IV, 30-39 MIN: ICD-10-PCS | Mod: S$PBB,,, | Performed by: UROLOGY

## 2019-03-04 PROCEDURE — 99999 PR PBB SHADOW E&M-EST. PATIENT-LVL III: ICD-10-PCS | Mod: PBBFAC,,, | Performed by: UROLOGY

## 2019-03-04 PROCEDURE — 81002 URINALYSIS NONAUTO W/O SCOPE: CPT | Mod: PBBFAC | Performed by: UROLOGY

## 2019-03-04 PROCEDURE — 99214 OFFICE O/P EST MOD 30 MIN: CPT | Mod: S$PBB,,, | Performed by: UROLOGY

## 2019-03-04 PROCEDURE — 99999 PR PBB SHADOW E&M-EST. PATIENT-LVL III: CPT | Mod: PBBFAC,,, | Performed by: UROLOGY

## 2019-03-04 PROCEDURE — 99213 OFFICE O/P EST LOW 20 MIN: CPT | Mod: PBBFAC | Performed by: UROLOGY

## 2019-03-04 RX ORDER — TAMSULOSIN HYDROCHLORIDE 0.4 MG/1
1 CAPSULE ORAL DAILY
Qty: 90 CAPSULE | Refills: 3 | Status: SHIPPED | OUTPATIENT
Start: 2019-03-04 | End: 2019-04-17

## 2019-03-04 RX ORDER — FINASTERIDE 5 MG/1
5 TABLET, FILM COATED ORAL DAILY
Qty: 90 TABLET | Refills: 3 | Status: SHIPPED | OUTPATIENT
Start: 2019-03-04 | End: 2020-03-09 | Stop reason: SDUPTHER

## 2019-03-04 RX ORDER — TAMSULOSIN HYDROCHLORIDE 0.4 MG/1
CAPSULE ORAL
Qty: 90 CAPSULE | Refills: 3 | Status: SHIPPED | OUTPATIENT
Start: 2019-03-04 | End: 2019-03-04 | Stop reason: SDUPTHER

## 2019-03-04 NOTE — PROGRESS NOTES
Chief Complaint: Urinary retention    HPI:   3/4/19: No hematuria and voiding fine.  Constipation still controlled with miralax.  Reviewed history in detail.  Voiding better than a year ago.  3/14/18: Finds that he voids fine when he manages his constipation and all is well feels fine.  Taking flomax/finasteride.  Reviewed history in detail.  3/7/17: Went into retention last weekend and is here with a myles.  No sig clots.  No new meds. -UCx.   ml.  2/9/17: Other than a simple left renal cyst no findings on CT Urogram. Cysto normal except BPH no recurrence of bladder cancer.  1/31/17: 89 yo man last week had a day of dark blood with clots last week.  Was dx with bladder cancer 15+ years ago.  Some surveillance cystoscopy but none in the last 10 years. No abd/pelvic pain and no exac/rel factors.  No urolithiasis.  No urinary bother.  Had 6 wks BCG with a restaging resection after; maybe more BCG after that.  Was told to worry about it no more.  Also had a TURP in that same time frame.  And some gross hematuria attributed to his prostate in 2005.    Allergies:  No known allergies    Medications:  has a current medication list which includes the following prescription(s): albuterol, albuterol-ipratropium, carvedilol, finasteride, fluticasone-salmeterol 250-50 mcg/dose, hydrochlorothiazide, klor-con m20, lisinopril, mucus clearing device, nitroglycerin, omeprazole, roflumilast, simvastatin, spiriva respimat, tamsulosin, tamsulosin, alprazolam, azithromycin, butenafine, gabapentin, and methylprednisolone.    Review of Systems:  General: No fever, chills, fatigability, or weight loss.  Skin: No rashes, itching, or changes in color or texture of skin.  Chest: Denies CARBAJAL, cyanosis, wheezing, cough, and sputum production.  Abdomen: Appetite fine. No weight loss. Denies diarrhea, abdominal pain, hematemesis, or blood in stool.  Musculoskeletal: No joint stiffness or swelling. Denies back pain.  : As above.  All other  review of systems negative.    PMH:   has a past medical history of BPH (benign prostatic hyperplasia), Cataract, Chronic airway obstruction, not elsewhere classified, Coronary atherosclerosis of unspecified type of vessel, native or graft, Esophageal reflux, Essential hypertension, History of bladder cancer, History of colon cancer, Hyperlipidemia LDL goal <70, Impaired fasting glucose, Macular degeneration, Pulmonary hypertension (12/18/2015), and TR (tricuspid regurgitation) (12/18/2015).    PSH:   has a past surgical history that includes partial coloectomy for large polyp; Coronary artery bypass graft; Colon surgery; Back surgery; and turbt.    FamHx: family history includes Asthma in his paternal uncle; Cancer in his brother; Diabetes in his father; Heart failure in his mother.    SocHx:  reports that he has quit smoking. His smoking use included cigarettes. He has a 140.00 pack-year smoking history. he has never used smokeless tobacco. He reports that he does not drink alcohol or use drugs.      Physical Exam:  Vitals:    03/04/19 1044   BP: 138/72   Pulse: 80     General: A&Ox3, no apparent distress, no deformities  Neck: No masses, normal thyroid  Lungs: normal inspiration, no use of accessory muscles  Heart: normal pulse, no arrhythmias  Abdomen: Soft, NT, NDSkin: The skin is warm and dry. No jaundice.  Ext: No c/c/e.  :   2/17: test desc danyel, CARLEY normal    Labs:  Urinalysis performed in clinic, summary: UA normal  Bladder Scan performed in office:     3/18: PVR 13 ml.    Impression/Plan:   1. Doing well, no hematuria in interval.  Refill flomax/finasteride.  Voiding fine. RTC 1 year.

## 2019-03-13 ENCOUNTER — LAB VISIT (OUTPATIENT)
Dept: LAB | Facility: HOSPITAL | Age: 84
End: 2019-03-13
Attending: INTERNAL MEDICINE
Payer: MEDICARE

## 2019-03-13 DIAGNOSIS — I10 ESSENTIAL HYPERTENSION: ICD-10-CM

## 2019-03-13 DIAGNOSIS — R73.01 IMPAIRED FASTING GLUCOSE: ICD-10-CM

## 2019-03-13 PROCEDURE — 85025 COMPLETE CBC W/AUTO DIFF WBC: CPT

## 2019-03-13 PROCEDURE — 80061 LIPID PANEL: CPT

## 2019-03-13 PROCEDURE — 83036 HEMOGLOBIN GLYCOSYLATED A1C: CPT

## 2019-03-13 PROCEDURE — 80053 COMPREHEN METABOLIC PANEL: CPT

## 2019-03-13 PROCEDURE — 36415 COLL VENOUS BLD VENIPUNCTURE: CPT | Mod: PO

## 2019-03-13 PROCEDURE — 84443 ASSAY THYROID STIM HORMONE: CPT

## 2019-03-14 LAB
ALBUMIN SERPL BCP-MCNC: 4.1 G/DL
ALP SERPL-CCNC: 54 U/L
ALT SERPL W/O P-5'-P-CCNC: 13 U/L
ANION GAP SERPL CALC-SCNC: 7 MMOL/L
AST SERPL-CCNC: 16 U/L
BASOPHILS # BLD AUTO: 0.02 K/UL
BASOPHILS NFR BLD: 0.3 %
BILIRUB SERPL-MCNC: 0.6 MG/DL
BUN SERPL-MCNC: 9 MG/DL
CALCIUM SERPL-MCNC: 10 MG/DL
CHLORIDE SERPL-SCNC: 97 MMOL/L
CHOLEST SERPL-MCNC: 164 MG/DL
CHOLEST/HDLC SERPL: 3.3 {RATIO}
CO2 SERPL-SCNC: 36 MMOL/L
CREAT SERPL-MCNC: 0.9 MG/DL
DIFFERENTIAL METHOD: ABNORMAL
EOSINOPHIL # BLD AUTO: 0.3 K/UL
EOSINOPHIL NFR BLD: 4 %
ERYTHROCYTE [DISTWIDTH] IN BLOOD BY AUTOMATED COUNT: 12.5 %
EST. GFR  (AFRICAN AMERICAN): >60 ML/MIN/1.73 M^2
EST. GFR  (NON AFRICAN AMERICAN): >60 ML/MIN/1.73 M^2
ESTIMATED AVG GLUCOSE: 114 MG/DL
GLUCOSE SERPL-MCNC: 113 MG/DL
HBA1C MFR BLD HPLC: 5.6 %
HCT VFR BLD AUTO: 44.3 %
HDLC SERPL-MCNC: 49 MG/DL
HDLC SERPL: 29.9 %
HGB BLD-MCNC: 13.9 G/DL
IMM GRANULOCYTES # BLD AUTO: 0.02 K/UL
IMM GRANULOCYTES NFR BLD AUTO: 0.3 %
LDLC SERPL CALC-MCNC: 95 MG/DL
LYMPHOCYTES # BLD AUTO: 1.9 K/UL
LYMPHOCYTES NFR BLD: 26 %
MCH RBC QN AUTO: 32.1 PG
MCHC RBC AUTO-ENTMCNC: 31.4 G/DL
MCV RBC AUTO: 102 FL
MONOCYTES # BLD AUTO: 0.6 K/UL
MONOCYTES NFR BLD: 8.8 %
NEUTROPHILS # BLD AUTO: 4.4 K/UL
NEUTROPHILS NFR BLD: 60.6 %
NONHDLC SERPL-MCNC: 115 MG/DL
NRBC BLD-RTO: 0 /100 WBC
PLATELET # BLD AUTO: 177 K/UL
PMV BLD AUTO: 10.7 FL
POTASSIUM SERPL-SCNC: 4.1 MMOL/L
PROT SERPL-MCNC: 7 G/DL
RBC # BLD AUTO: 4.33 M/UL
SODIUM SERPL-SCNC: 140 MMOL/L
TRIGL SERPL-MCNC: 100 MG/DL
TSH SERPL DL<=0.005 MIU/L-ACNC: 2.57 UIU/ML
WBC # BLD AUTO: 7.2 K/UL

## 2019-03-15 ENCOUNTER — OFFICE VISIT (OUTPATIENT)
Dept: OPHTHALMOLOGY | Facility: CLINIC | Age: 84
End: 2019-03-15
Payer: MEDICARE

## 2019-03-15 DIAGNOSIS — H35.3221 EXUDATIVE AGE-RELATED MACULAR DEGENERATION OF LEFT EYE WITH ACTIVE CHOROIDAL NEOVASCULARIZATION: Primary | ICD-10-CM

## 2019-03-15 DIAGNOSIS — H35.3212 WET AGE-RELATED MACULAR DEGENERATION OF RIGHT EYE WITH INACTIVE CHOROIDAL NEOVASCULARIZATION: ICD-10-CM

## 2019-03-15 PROCEDURE — 67028 PR INJECT INTRAVITREAL PHARMCOLOGIC: ICD-10-PCS | Mod: S$PBB,LT,, | Performed by: OPHTHALMOLOGY

## 2019-03-15 PROCEDURE — C9257 BEVACIZUMAB INJECTION: HCPCS | Mod: PN | Performed by: OPHTHALMOLOGY

## 2019-03-15 PROCEDURE — 67028 INJECTION EYE DRUG: CPT | Mod: S$PBB,LT,, | Performed by: OPHTHALMOLOGY

## 2019-03-15 PROCEDURE — 92134 POSTERIOR SEGMENT OCT RETINA (OCULAR COHERENCE TOMOGRAPHY)-BOTH EYES: ICD-10-PCS | Mod: 26,S$PBB,, | Performed by: OPHTHALMOLOGY

## 2019-03-15 PROCEDURE — 92014 COMPRE OPH EXAM EST PT 1/>: CPT | Mod: 25,S$PBB,, | Performed by: OPHTHALMOLOGY

## 2019-03-15 PROCEDURE — 99212 OFFICE O/P EST SF 10 MIN: CPT | Mod: PBBFAC,PN | Performed by: OPHTHALMOLOGY

## 2019-03-15 PROCEDURE — 92134 CPTRZ OPH DX IMG PST SGM RTA: CPT | Mod: PBBFAC,PN | Performed by: OPHTHALMOLOGY

## 2019-03-15 PROCEDURE — 99999 PR PBB SHADOW E&M-EST. PATIENT-LVL II: ICD-10-PCS | Mod: PBBFAC,,, | Performed by: OPHTHALMOLOGY

## 2019-03-15 PROCEDURE — 67028 INJECTION EYE DRUG: CPT | Mod: PBBFAC,PN,LT | Performed by: OPHTHALMOLOGY

## 2019-03-15 PROCEDURE — 92014 PR EYE EXAM, EST PATIENT,COMPREHESV: ICD-10-PCS | Mod: 25,S$PBB,, | Performed by: OPHTHALMOLOGY

## 2019-03-15 PROCEDURE — 99999 PR PBB SHADOW E&M-EST. PATIENT-LVL II: CPT | Mod: PBBFAC,,, | Performed by: OPHTHALMOLOGY

## 2019-03-15 RX ORDER — POLYMYXIN B SULFATE AND TRIMETHOPRIM 1; 10000 MG/ML; [USP'U]/ML
1 SOLUTION OPHTHALMIC 4 TIMES DAILY
Qty: 1 BOTTLE | Refills: 0 | Status: SHIPPED | OUTPATIENT
Start: 2019-03-15 | End: 2019-03-19

## 2019-03-15 RX ORDER — HYOSCYAMINE SULFATE 0.125 MG
0.12 TABLET ORAL EVERY 4 HOURS PRN
COMMUNITY

## 2019-03-15 RX ADMIN — BEVACIZUMAB 1.25 MG: 100 INJECTION, SOLUTION INTRAVENOUS at 10:03

## 2019-03-15 NOTE — PROGRESS NOTES
===============================  03/15/2019   Kelton Mcdaniels,   89 y.o. male   Last visit [facility]: :3/13/2018   Last visit eye dept. Visit date not found  [facility]  Corrected [place] visual acuity was CF at 3' in the right eye and 20/60 in the left eye.  Tonometry     Tonometry (Applanation, 10:11 AM)       Right Left    Pressure 16 16               Not recorded         Not recorded        Chief Complaint   Patient presents with    Macular Degeneration     yearly exam        HPI     Macular Degeneration      Additional comments: yearly exam              Comments     --Smd/srn od dx. 10/5/15   --2+ vac cat ou      EYLEA OD 1/20/17  H/o Avastin          Last edited by Megan Leong on 3/15/2019 10:02 AM. (History)          ________________  3/15/2019  Problem List Items Addressed This Visit        Eye/Vision problems    Exudative age-related macular degeneration of left eye with active choroidal neovascularization - Primary    Overview     Diagnosed 10/5/15 symptomatic 4 months at that time.           Relevant Medications    bevacizumab (AVASTIN) 2.5 mg/0.10 mL injection 1.25 mg (Completed)    polymyxin B sulf-trimethoprim (POLYTRIM) 10,000 unit- 1 mg/mL Drop    Other Relevant Orders    Prior Authorization Order          New os rped srn    3/15/2019  Diagnosis :  Os rn  Today:   Avastin (Bevacizumab) 1.25 mg/0.05 ml Intravitreal Injection , OS   Follow up: rtc  2 weeks       Instructed to call 24/7 for any worsening of vision. Check Both eyes daily. Gave patient my home phone number.    Procedure  Note:   OS}  Avastin (Bevacizumab) 1.25 mg/0.05 ml Intravitreal Injection    I have explained the Risks, Benefits and Alternatives of the procedure in detail.  The patient voices understanding and all questions have been answered.  The patient agrees to proceed as discussed.  Xylocaine with Epi 2%  subconj bleb  was used for anesthesia.  Topical betadine  was used for antisepsis.  0.05 cc was  injected 3.7 mm from corneal  limbus in the inferotemporal quadrant.  Following injection the IOP was less than thirty (<30) by tonopen.  The eye was then thoroughly irrigated with BSS.  Patient tolerated procedure well.  No complications were observed.  The Patient was educated that mild irritation tonight was normal secondary to topical antispsis use.  Pt was advised to call at any time day or night for pain, redness, or any decline in vision. I gave the patient my home number as well as the clinic on call number. Daily visual checks and Amsler grid testing were reviewed.  polytrim Antibiotic Drops to be used 4 times daily for 4 days  CRESCENCIO Phillips MD  Procedure ordered: y  Consent: y  Pre auth: y  MAR:y  Opnote: y  Charge capture:y      .       ===========================

## 2019-03-20 ENCOUNTER — OFFICE VISIT (OUTPATIENT)
Dept: INTERNAL MEDICINE | Facility: CLINIC | Age: 84
End: 2019-03-20
Payer: MEDICARE

## 2019-03-20 VITALS
BODY MASS INDEX: 29.34 KG/M2 | OXYGEN SATURATION: 96 % | SYSTOLIC BLOOD PRESSURE: 148 MMHG | TEMPERATURE: 98 F | HEIGHT: 67 IN | HEART RATE: 82 BPM | WEIGHT: 186.94 LBS | DIASTOLIC BLOOD PRESSURE: 80 MMHG | RESPIRATION RATE: 21 BRPM

## 2019-03-20 DIAGNOSIS — Z86.010 HISTORY OF COLONIC POLYPS: ICD-10-CM

## 2019-03-20 DIAGNOSIS — J44.9 COPD, SEVERE: Chronic | ICD-10-CM

## 2019-03-20 DIAGNOSIS — E78.2 MIXED HYPERLIPIDEMIA: Primary | ICD-10-CM

## 2019-03-20 DIAGNOSIS — Z85.51 HISTORY OF BLADDER CANCER: ICD-10-CM

## 2019-03-20 DIAGNOSIS — I47.19 MULTIFOCAL ATRIAL TACHYCARDIA: ICD-10-CM

## 2019-03-20 DIAGNOSIS — I10 ESSENTIAL HYPERTENSION: ICD-10-CM

## 2019-03-20 DIAGNOSIS — R73.01 IMPAIRED FASTING GLUCOSE: ICD-10-CM

## 2019-03-20 DIAGNOSIS — R09.02 HYPOXEMIA REQUIRING SUPPLEMENTAL OXYGEN: ICD-10-CM

## 2019-03-20 DIAGNOSIS — K21.9 GASTROESOPHAGEAL REFLUX DISEASE, ESOPHAGITIS PRESENCE NOT SPECIFIED: ICD-10-CM

## 2019-03-20 DIAGNOSIS — R09.02 EXERCISE HYPOXEMIA: Chronic | ICD-10-CM

## 2019-03-20 DIAGNOSIS — I07.1 TRICUSPID VALVE INSUFFICIENCY, UNSPECIFIED ETIOLOGY: ICD-10-CM

## 2019-03-20 DIAGNOSIS — I25.10 CAD IN NATIVE ARTERY: ICD-10-CM

## 2019-03-20 DIAGNOSIS — Z99.81 HYPOXEMIA REQUIRING SUPPLEMENTAL OXYGEN: ICD-10-CM

## 2019-03-20 DIAGNOSIS — Z85.038 HISTORY OF COLON CANCER: ICD-10-CM

## 2019-03-20 PROCEDURE — 99999 PR PBB SHADOW E&M-EST. PATIENT-LVL V: CPT | Mod: PBBFAC,,, | Performed by: INTERNAL MEDICINE

## 2019-03-20 PROCEDURE — 99999 PR PBB SHADOW E&M-EST. PATIENT-LVL V: ICD-10-PCS | Mod: PBBFAC,,, | Performed by: INTERNAL MEDICINE

## 2019-03-20 PROCEDURE — 99214 OFFICE O/P EST MOD 30 MIN: CPT | Mod: S$PBB,,, | Performed by: INTERNAL MEDICINE

## 2019-03-20 PROCEDURE — 99214 PR OFFICE/OUTPT VISIT, EST, LEVL IV, 30-39 MIN: ICD-10-PCS | Mod: S$PBB,,, | Performed by: INTERNAL MEDICINE

## 2019-03-20 PROCEDURE — 99215 OFFICE O/P EST HI 40 MIN: CPT | Mod: PBBFAC,PO | Performed by: INTERNAL MEDICINE

## 2019-03-20 RX ORDER — IPRATROPIUM BROMIDE AND ALBUTEROL SULFATE 2.5; .5 MG/3ML; MG/3ML
3 SOLUTION RESPIRATORY (INHALATION)
Qty: 270 ML | Refills: 11 | Status: SHIPPED | OUTPATIENT
Start: 2019-03-20 | End: 2021-07-03 | Stop reason: SDUPTHER

## 2019-03-20 NOTE — PROGRESS NOTES
"HPI:  Patient is a 89-year-old man who comes today for follow-up of his impaired fasting glucose, hypertension, hyperlipidemia, coronary artery disease, COPD and for his annual physical.  He has been doing overall about the same.  He denies any chest pains.  There has been no change in his chronic dyspnea.  He is on home O2 24/7.      Current MEDS: medcard review, verified and update  Allergies: Per the electronic medical record    Past Medical History:   Diagnosis Date    BPH (benign prostatic hyperplasia)     Cataract     Chronic airway obstruction, not elsewhere classified     Coronary atherosclerosis of unspecified type of vessel, native or graft     Esophageal reflux     Essential hypertension     History of bladder cancer     History of colon cancer     Hyperlipidemia LDL goal <70     Impaired fasting glucose     Macular degeneration     Pulmonary hypertension 12/18/2015    TR (tricuspid regurgitation) 12/18/2015       Past Surgical History:   Procedure Laterality Date    BACK SURGERY      COLON SURGERY      CORONARY ARTERY BYPASS GRAFT      partial coloectomy for large polyp      turbt         SHx: per the electronic medical record    FHx: recorded in the electronic medical record    ROS:    denies any chest pains or shortness of breath. Denies any nausea, vomiting or diarrhea. Denies any fever, chills or sweats. Denies any change in weight, voice, stool, skin or hair. Denies any dysuria, dyspepsia or dysphagia. Denies any change in vision, hearing or headaches. Denies any swollen lymph nodes or loss of memory.    PE:  BP (!) 148/80 (BP Location: Left arm, Patient Position: Sitting, BP Method: Large (Manual))   Pulse 82   Temp 98.2 °F (36.8 °C) (Tympanic)   Resp (!) 21   Ht 5' 7" (1.702 m)   Wt 84.8 kg (186 lb 15.2 oz)   SpO2 96%   BMI 29.28 kg/m²   Gen: Well-developed, well-nourished, male, in no acute distress, oriented x3  HEENT: neck is supple, no adenopathy, carotids 2+ equal " without bruits, thyroid exam normal size without nodules.  CHEST: clear to auscultation and percussion.  He has very distant breath sounds  CVS: regular rate and rhythm without significant murmur, gallop, or rubs  ABD: soft, benign, no rebound no guarding, no distention.  Bowel sounds are normal.     nontender.  No palpable masses.  No organomegaly and no audible bruits.      Lab Results   Component Value Date    WBC 7.20 03/13/2019    HGB 13.9 (L) 03/13/2019    HCT 44.3 03/13/2019     03/13/2019    CHOL 164 03/13/2019    TRIG 100 03/13/2019    HDL 49 03/13/2019    ALT 13 03/13/2019    AST 16 03/13/2019     03/13/2019    K 4.1 03/13/2019    CL 97 03/13/2019    CREATININE 0.9 03/13/2019    BUN 9 (L) 03/13/2019    CO2 36 (H) 03/13/2019    TSH 2.573 03/13/2019    PSA 3.8 08/15/2011    INR 1.0 12/08/2015    HGBA1C 5.6 03/13/2019       Impression:  Numerous medical problems below, stable  Patient Active Problem List   Diagnosis    Impaired fasting glucose    Essential hypertension    Esophageal reflux    History of colonic polyps    Lumbar radiculopathy    Exudative age-related macular degeneration of left eye with active choroidal neovascularization    CAD in native artery    Mixed hyperlipidemia    Pulmonary hypertension    TR (tricuspid regurgitation)    Multifocal atrial tachycardia    COPD, severe    Gross hematuria    Exercise hypoxemia    Hypoxemia requiring supplemental oxygen    Anxiety    History of bladder cancer    History of colon cancer    Entropion of left eyelid    BPH (benign prostatic hyperplasia)       Plan:   Orders Placed This Encounter    Hemoglobin A1c    Basic metabolic panel    Lipid panel    albuterol-ipratropium (DUO-NEB) 2.5 mg-0.5 mg/3 mL nebulizer solution     Medications remain the same.  He will be seen again in 6 months with above lab work.    This note is generated with speech recognition software and is subject to transcription error and sound alike  phrases that may be missed by proofreading.

## 2019-04-07 DIAGNOSIS — R06.02 SOB (SHORTNESS OF BREATH): ICD-10-CM

## 2019-04-08 RX ORDER — ALBUTEROL SULFATE 90 UG/1
AEROSOL, METERED RESPIRATORY (INHALATION)
Qty: 9 EACH | Refills: 11 | Status: SHIPPED | OUTPATIENT
Start: 2019-04-08 | End: 2019-06-03 | Stop reason: SDUPTHER

## 2019-04-17 ENCOUNTER — PROCEDURE VISIT (OUTPATIENT)
Dept: OPHTHALMOLOGY | Facility: CLINIC | Age: 84
End: 2019-04-17
Payer: MEDICARE

## 2019-04-17 DIAGNOSIS — H35.3221 EXUDATIVE AGE-RELATED MACULAR DEGENERATION OF LEFT EYE WITH ACTIVE CHOROIDAL NEOVASCULARIZATION: Primary | ICD-10-CM

## 2019-04-17 PROCEDURE — 67028 PR INJECT INTRAVITREAL PHARMCOLOGIC: ICD-10-PCS | Mod: S$PBB,LT,, | Performed by: OPHTHALMOLOGY

## 2019-04-17 PROCEDURE — C9257 BEVACIZUMAB INJECTION: HCPCS | Mod: PBBFAC,JG,PN | Performed by: OPHTHALMOLOGY

## 2019-04-17 PROCEDURE — 67028 INJECTION EYE DRUG: CPT | Mod: PBBFAC,PN,LT | Performed by: OPHTHALMOLOGY

## 2019-04-17 PROCEDURE — 92134 CPTRZ OPH DX IMG PST SGM RTA: CPT | Mod: PBBFAC,PN | Performed by: OPHTHALMOLOGY

## 2019-04-17 PROCEDURE — 99499 NO LOS: ICD-10-PCS | Mod: S$PBB,,, | Performed by: OPHTHALMOLOGY

## 2019-04-17 PROCEDURE — 99499 UNLISTED E&M SERVICE: CPT | Mod: S$PBB,,, | Performed by: OPHTHALMOLOGY

## 2019-04-17 PROCEDURE — 67028 INJECTION EYE DRUG: CPT | Mod: S$PBB,LT,, | Performed by: OPHTHALMOLOGY

## 2019-04-17 PROCEDURE — 92134 POSTERIOR SEGMENT OCT RETINA (OCULAR COHERENCE TOMOGRAPHY)-BOTH EYES: ICD-10-PCS | Mod: 26,S$PBB,, | Performed by: OPHTHALMOLOGY

## 2019-04-17 RX ADMIN — BEVACIZUMAB 1.25 MG: 100 INJECTION, SOLUTION INTRAVENOUS at 11:04

## 2019-04-17 NOTE — PROGRESS NOTES
===============================  04/17/2019   Kelton Mcdaniels,   89 y.o. male   Last visit [facility]: :3/15/2019   Last visit eye dept. 3/15/2019  [facility]  Corrected distance visual acuity was CF at 3' in the right eye and 20/50 in the left eye.   Not recorded         Not recorded         Not recorded        Chief Complaint   Patient presents with    Macular Degeneration     avastin os        HPI     Macular Degeneration      Additional comments: avastin os              Comments     --Smd/srn od dx. 10/5/15   --2+ vac cat ou      EYLEA OD 1/20/17  H/o Avastin  AVASTIN OS 3/15/19          Last edited by Megan Leong on 4/17/2019 10:54 AM. (History)          ________________  4/17/2019  Problem List Items Addressed This Visit        Eye/Vision problems    Exudative age-related macular degeneration of left eye with active choroidal neovascularization - Primary    Overview     Diagnosed 10/5/15 symptomatic 4 months at that time.           Relevant Medications    bevacizumab (AVASTIN) 2.5 mg/0.10 mL injection 1.25 mg (Completed)    Other Relevant Orders    Prior Authorization Order    Posterior Segment OCT Retina-Both eyes (Completed)          .avastin today  No better  Recommend begin eylea OS next time  Oct no better - rec eyla in 2 weks     4/17/2019  Diagnosis :  Os srn  Emergent last mop   Today:   avsrtin  , OS   Follow up: rtc 2 weks eylea        Instructed to call 24/7 for any worsening of vision. Check Both eyes daily. Gave patient my home phone number.      Procedure  Note:   OS}  eylea    I have explained the Risks, Benefits and Alternatives of the procedure in detail.  The patient voices understanding and all questions have been answered.  The patient agrees to proceed as discussed.  Xylocaine with Epi 2%  subconj bleb  was used for anesthesia.  Topical betadine was used for antisepsis.  0.05 cc was  injected 3.7 mm from corneal limbus in the inferotemporal quadrant.  Following injection the IOP was less than  thirty (<30) by tonopen.  The eye was then thoroughly irrigated with BSS.  Patient tolerated procedure well.  No complications were observed.  The Patient was educated that mild irritation tonight was normal secondary to topical antispsis use.  Pt was advised to call at any time day or night for pain, redness, or any decline in vision. I gave the patient my home number as well as the clinic on call number. Daily visual checks and Amsler grid testing were reviewed.  polytrim Antibiotic Drops to be used 4 times daily for 4 days  CRESCENCIO Phillips MD  Procedure ordered: y  Consent: y  Pre auth: y  MAR:y  Opnote: y  Charge capture:y         ===========================

## 2019-05-01 ENCOUNTER — PROCEDURE VISIT (OUTPATIENT)
Dept: OPHTHALMOLOGY | Facility: CLINIC | Age: 84
End: 2019-05-01
Payer: MEDICARE

## 2019-05-01 DIAGNOSIS — H35.3221 EXUDATIVE AGE-RELATED MACULAR DEGENERATION OF LEFT EYE WITH ACTIVE CHOROIDAL NEOVASCULARIZATION: Primary | ICD-10-CM

## 2019-05-01 PROCEDURE — 99499 UNLISTED E&M SERVICE: CPT | Mod: S$PBB,,, | Performed by: OPHTHALMOLOGY

## 2019-05-01 PROCEDURE — 99499 NO LOS: ICD-10-PCS | Mod: S$PBB,,, | Performed by: OPHTHALMOLOGY

## 2019-05-01 PROCEDURE — 92134 CPTRZ OPH DX IMG PST SGM RTA: CPT | Mod: PBBFAC | Performed by: OPHTHALMOLOGY

## 2019-05-01 PROCEDURE — 67028 PR INJECT INTRAVITREAL PHARMCOLOGIC: ICD-10-PCS | Mod: S$PBB,LT,, | Performed by: OPHTHALMOLOGY

## 2019-05-01 PROCEDURE — 92134 POSTERIOR SEGMENT OCT RETINA (OCULAR COHERENCE TOMOGRAPHY)-BOTH EYES: ICD-10-PCS | Mod: 26,S$PBB,, | Performed by: OPHTHALMOLOGY

## 2019-05-01 PROCEDURE — 67028 INJECTION EYE DRUG: CPT | Mod: S$PBB,LT,, | Performed by: OPHTHALMOLOGY

## 2019-05-01 PROCEDURE — 67028 INJECTION EYE DRUG: CPT | Mod: PBBFAC,LT | Performed by: OPHTHALMOLOGY

## 2019-05-01 RX ADMIN — AFLIBERCEPT 2 MG: 40 INJECTION, SOLUTION INTRAVITREAL at 03:05

## 2019-05-01 NOTE — PROGRESS NOTES
===============================  05/01/2019   Kelton Mcdaniels,   89 y.o. male   Last visit [facility]: :4/17/2019   Last visit eye dept. 4/17/2019  [facility]  Corrected distance visual acuity was CF at 3' in the right eye and 20/60 in the left eye.   Not recorded         Not recorded         Not recorded        Chief Complaint   Patient presents with    srn     eylea os# 1, pt states ou seems like there is a film over them        HPI     srn      Additional comments: eylea os# 1, pt states ou seems like there is a   film over them              Comments     --Smd/srn od dx. 10/5/15   History of avastin and eylea od, last 1/20/17  --2+ vac cat ou  New Srn os dx and treated with avastin 3/15/19 -- 4/17/19          Last edited by VALERIA White on 5/1/2019  3:25 PM. (History)          ________________  5/1/2019  Problem List Items Addressed This Visit        Eye/Vision problems    Exudative age-related macular degeneration of left eye with active choroidal neovascularization - Primary    Overview     Diagnosed 10/5/15 symptomatic 4 months at that time.           Relevant Medications    aflibercept Soln 2 mg (Completed) (Start on 5/1/2019  4:00 PM)    Other Relevant Orders    Prior Authorization Order    Posterior Segment OCT Retina-Both eyes (Completed)        negrito lindagent avstin     'avstin failure   rec eya;     5/1/2019  Diagnosis :  Os srn  Today:   Eylea (afibercept) 2 mg/0.05 ml Intravitreal Injection , OS   Follow up: rtc 1 mo - do        Instructed to call 24/7 for any worsening of vision. Check Both eyes daily. Gave patient my home phone number.      Procedure  Note:   OS}  Eylea (afibercept) 2 mg/0.05 ml Intravitreal Injection    I have explained the Risks, Benefits and Alternatives of the procedure in detail.  The patient voices understanding and all questions have been answered.  The patient agrees to proceed as discussed.  Xylocaine with Epi 2%  subconj bleb  was used for anesthesia.  Topical betadine was  used for antisepsis.  0.05 cc was  injected 3.7 mm from corneal limbus in the inferotemporal quadrant.  Following injection the IOP was less than thirty (<30) by tonopen.  The eye was then thoroughly irrigated with BSS.  Patient tolerated procedure well.  No complications were observed.  The Patient was educated that mild irritation tonight was normal secondary to topical antispsis use.  Pt was advised to call at any time day or night for pain, redness, or any decline in vision. I gave the patient my home number as well as the clinic on call number. Daily visual checks and Amsler grid testing were reviewed.  polytrim Antibiotic Drops to be used 4 times daily for 4 days  CRESCENCIO Phillips MD  Procedure ordered: y  Consent: y  Pre auth: y  MAR:y  Opnote: y  Charge capture:y      .       ===========================

## 2019-05-03 DIAGNOSIS — I10 ESSENTIAL HYPERTENSION: ICD-10-CM

## 2019-05-03 RX ORDER — HYDROCHLOROTHIAZIDE 25 MG/1
TABLET ORAL
Qty: 90 TABLET | Refills: 3 | Status: SHIPPED | OUTPATIENT
Start: 2019-05-03 | End: 2020-04-27 | Stop reason: SDUPTHER

## 2019-05-30 ENCOUNTER — PROCEDURE VISIT (OUTPATIENT)
Dept: OPHTHALMOLOGY | Facility: CLINIC | Age: 84
End: 2019-05-30
Payer: MEDICARE

## 2019-05-30 DIAGNOSIS — H35.3221 EXUDATIVE AGE-RELATED MACULAR DEGENERATION OF LEFT EYE WITH ACTIVE CHOROIDAL NEOVASCULARIZATION: Primary | ICD-10-CM

## 2019-05-30 PROCEDURE — 92134 CPTRZ OPH DX IMG PST SGM RTA: CPT | Mod: PBBFAC,PN | Performed by: OPHTHALMOLOGY

## 2019-05-30 PROCEDURE — 99499 UNLISTED E&M SERVICE: CPT | Mod: S$PBB,,, | Performed by: OPHTHALMOLOGY

## 2019-05-30 PROCEDURE — 67028 INJECTION EYE DRUG: CPT | Mod: S$PBB,LT,, | Performed by: OPHTHALMOLOGY

## 2019-05-30 PROCEDURE — 67028 INJECTION EYE DRUG: CPT | Mod: PBBFAC,PN,LT | Performed by: OPHTHALMOLOGY

## 2019-05-30 PROCEDURE — 92134 POSTERIOR SEGMENT OCT RETINA (OCULAR COHERENCE TOMOGRAPHY)-BOTH EYES: ICD-10-PCS | Mod: 26,S$PBB,, | Performed by: OPHTHALMOLOGY

## 2019-05-30 PROCEDURE — 67028 PR INJECT INTRAVITREAL PHARMCOLOGIC: ICD-10-PCS | Mod: S$PBB,LT,, | Performed by: OPHTHALMOLOGY

## 2019-05-30 PROCEDURE — 99499 NO LOS: ICD-10-PCS | Mod: S$PBB,,, | Performed by: OPHTHALMOLOGY

## 2019-05-30 RX ADMIN — AFLIBERCEPT 2 MG: 40 INJECTION, SOLUTION INTRAVITREAL at 01:05

## 2019-05-30 NOTE — PROGRESS NOTES
===============================  05/30/2019   Kelton Mcdaniels,   89 y.o. male   Last visit [facility]: :5/1/2019   Last visit eye dept. 5/1/2019  [facility]  Corrected distance visual acuity was CF at 3' in the right eye and 20/60 in the left eye.   Not recorded         Not recorded         Not recorded        Chief Complaint   Patient presents with    SRN     pt here for eylea OS. states he's still having trouble seeing objects when there's a dark background. has been an ongoing issue, seems to be getting worse. no ocular pain or irritation. states he's been having a lot of irritation around his eye after his injections, was worse the last injection.         HPI     SRN      Additional comments: pt here for eylea OS. states he's still having   trouble seeing objects when there's a dark background. has been an ongoing   issue, seems to be getting worse. no ocular pain or irritation. states   he's been having a lot of irritation around his eye after his injections,   was worse the last injection.               Comments     --Smd/srn od dx. 10/5/15   History of avastin and eylea od, last 1/20/17  --2+ vac cat ou  New Srn os dx and treated with avastin 3/15/19 -- 4/17/19  Eylea OS #1 5/1/19          Last edited by Vinayak Bah on 5/30/2019  1:20 PM. (History)          ________________  5/30/2019  Problem List Items Addressed This Visit        Eye/Vision problems    Exudative age-related macular degeneration of left eye with active choroidal neovascularization - Primary    Overview     Diagnosed 10/5/15 symptomatic 4 months at that time.           Relevant Medications    aflibercept Soln 2 mg (Completed)    Other Relevant Orders    Posterior Segment OCT Retina-Both eyes (Completed)    Prior Authorization Order      new srn here  3/15 /19:    Today great        5/30/2019  Diagnosis :  Os srn  Today:   Eylea (afibercept) 2 mg/0.05 ml Intravitreal Injection , OS   Follow up: rtc 1mo      Instructed to call 24/7 for any worsening  of vision. Check Both eyes daily. Gave patient my home phone number.      Procedure  Note:   OS}  Eylea (afibercept) 2 mg/0.05 ml Intravitreal Injection    I have explained the Risks, Benefits and Alternatives of the procedure in detail.  The patient voices understanding and all questions have been answered.  The patient agrees to proceed as discussed.  Xylocaine with Epi 2%  Sub conj bleb  was used for anesthesia.  Topical vigamox was used for antisepsis.  0.05 cc was  injected 3.7 mm from corneal limbus in the inferotemporal quadrant.  Following injection the IOP was less than thirty (<30) by tonopen.  The eye was then thoroughly irrigated with BSS.  Patient tolerated procedure well.  No complications were observed.  The Patient was educated that mild irritation tonight was normal secondary to topical antispsis use.  Pt was advised to call at any time day or night for pain, redness, or any decline in vision. I gave the patient my home number as well as the clinic on call number. Daily visual checks and Amsler grid testing were reviewed.  polytrim Antibiotic Drops to be used 4 times daily for 4 days  CRESCENCIO Phillips MD  Procedure ordered: y  Consent: y  Pre auth: y  MAR:y  Opnote: y  Charge capture:y      .       ===========================

## 2019-06-03 DIAGNOSIS — R06.02 SOB (SHORTNESS OF BREATH): ICD-10-CM

## 2019-06-03 RX ORDER — ALBUTEROL SULFATE 90 UG/1
2 AEROSOL, METERED RESPIRATORY (INHALATION) EVERY 4 HOURS PRN
Qty: 9 EACH | Refills: 11 | Status: SHIPPED | OUTPATIENT
Start: 2019-06-03 | End: 2019-08-09 | Stop reason: SDUPTHER

## 2019-07-01 ENCOUNTER — PROCEDURE VISIT (OUTPATIENT)
Dept: OPHTHALMOLOGY | Facility: CLINIC | Age: 84
End: 2019-07-01
Payer: MEDICARE

## 2019-07-01 DIAGNOSIS — H35.3221 EXUDATIVE AGE-RELATED MACULAR DEGENERATION OF LEFT EYE WITH ACTIVE CHOROIDAL NEOVASCULARIZATION: Primary | ICD-10-CM

## 2019-07-01 PROCEDURE — 67028 PR INJECT INTRAVITREAL PHARMCOLOGIC: ICD-10-PCS | Mod: S$PBB,LT,, | Performed by: OPHTHALMOLOGY

## 2019-07-01 PROCEDURE — 92134 CPTRZ OPH DX IMG PST SGM RTA: CPT | Mod: PBBFAC | Performed by: OPHTHALMOLOGY

## 2019-07-01 PROCEDURE — 99499 UNLISTED E&M SERVICE: CPT | Mod: S$PBB,,, | Performed by: OPHTHALMOLOGY

## 2019-07-01 PROCEDURE — 67028 INJECTION EYE DRUG: CPT | Mod: PBBFAC,LT | Performed by: OPHTHALMOLOGY

## 2019-07-01 PROCEDURE — 67028 INJECTION EYE DRUG: CPT | Mod: S$PBB,LT,, | Performed by: OPHTHALMOLOGY

## 2019-07-01 PROCEDURE — 99499 NO LOS: ICD-10-PCS | Mod: S$PBB,,, | Performed by: OPHTHALMOLOGY

## 2019-07-01 PROCEDURE — 92134 POSTERIOR SEGMENT OCT RETINA (OCULAR COHERENCE TOMOGRAPHY)-BOTH EYES: ICD-10-PCS | Mod: 26,S$PBB,, | Performed by: OPHTHALMOLOGY

## 2019-07-01 RX ADMIN — AFLIBERCEPT 2 MG: 40 INJECTION, SOLUTION INTRAVITREAL at 02:07

## 2019-07-01 NOTE — PROGRESS NOTES
===============================  07/01/2019   Kelton Mcdaniels,   89 y.o. male   Last visit [facility]: :5/30/2019   Last visit eye dept. 5/30/2019  [facility]  Corrected distance visual acuity was 20/200 in the right eye and 20/50 in the left eye.   Not recorded         Not recorded         Not recorded        Chief Complaint   Patient presents with    Macular Degeneration     eylea os        HPI     Macular Degeneration      Additional comments: eylea os              Comments     --Smd/srn od dx. 10/5/15   History of avastin and eylea od, last 1/20/17  --2+ vac cat ou  New Srn os dx and treated with avastin 3/15/19 -- 4/17/19  Eylea OS 5/30/19          Last edited by Megan Leong on 7/1/2019  1:00 PM. (History)          ________________  7/1/2019  Problem List Items Addressed This Visit        Eye/Vision problems    Exudative age-related macular degeneration of left eye with active choroidal neovascularization - Primary    Overview     Diagnosed 10/5/15 symptomatic 4 months at that time.           Relevant Medications    aflibercept Soln 2 mg (Completed)    Other Relevant Orders    Posterior Segment OCT Retina-Both eyes    Prior Authorization Order        Os better!  c eyela      7/1/2019  Diagnosis :  s srn  Today:   Eylea (afibercept) 2 mg/0.05 ml Intravitreal Injection , OS   Follow up: rtc  1mo         Instructed to call 24/7 for any worsening of vision. Check Both eyes daily. Gave patient my home phone number.      Procedure  Note:   OS}  Eylea (afibercept) 2 mg/0.05 ml Intravitreal Injection    I have explained the Risks, Benefits and Alternatives of the procedure in detail.  The patient voices understanding and all questions have been answered.  The patient agrees to proceed as discussed.  Xylocaine with Epi 2%  subconj bleb  was used for anesthesia.  Topical betadine was used for antisepsis.  0.05 cc was  injected 3.7 mm from corneal limbus in the inferotemporal quadrant.  Following injection the IOP was less  than thirty (<30) by tonopen.  The eye was then thoroughly irrigated with BSS.  Patient tolerated procedure well.  No complications were observed.  The Patient was educated that mild irritation tonight was normal secondary to topical antispsis use.  Pt was advised to call at any time day or night for pain, redness, or any decline in vision. I gave the patient my home number as well as the clinic on call number. Daily visual checks and Amsler grid testing were reviewed.  ciloxan Antibiotic Drops to be used 4 times daily for 4 days  CRESCENCIO Phillips MD  Procedure ordered: y  Consent: y  Pre auth: y  MAR:y  Opnote: y  Charge capture:y      .       ===========================

## 2019-07-09 ENCOUNTER — PATIENT MESSAGE (OUTPATIENT)
Dept: OPHTHALMOLOGY | Facility: CLINIC | Age: 84
End: 2019-07-09

## 2019-07-10 ENCOUNTER — OFFICE VISIT (OUTPATIENT)
Dept: OPHTHALMOLOGY | Facility: CLINIC | Age: 84
End: 2019-07-10
Payer: MEDICARE

## 2019-07-10 DIAGNOSIS — L23.3 ALLERGIC CONTACT DERMATITIS DUE TO DRUGS IN CONTACT WITH SKIN: Primary | ICD-10-CM

## 2019-07-10 PROCEDURE — 99212 OFFICE O/P EST SF 10 MIN: CPT | Mod: PBBFAC | Performed by: OPHTHALMOLOGY

## 2019-07-10 PROCEDURE — 99212 PR OFFICE/OUTPT VISIT, EST, LEVL II, 10-19 MIN: ICD-10-PCS | Mod: S$PBB,,, | Performed by: OPHTHALMOLOGY

## 2019-07-10 PROCEDURE — 99212 OFFICE O/P EST SF 10 MIN: CPT | Mod: S$PBB,,, | Performed by: OPHTHALMOLOGY

## 2019-07-10 PROCEDURE — 99999 PR PBB SHADOW E&M-EST. PATIENT-LVL II: ICD-10-PCS | Mod: PBBFAC,,, | Performed by: OPHTHALMOLOGY

## 2019-07-10 PROCEDURE — 99999 PR PBB SHADOW E&M-EST. PATIENT-LVL II: CPT | Mod: PBBFAC,,, | Performed by: OPHTHALMOLOGY

## 2019-07-10 NOTE — PROGRESS NOTES
===============================  07/10/2019   Kelton Mcdaniels,   89 y.o. male   Last visit [facility]: :7/1/2019   Last visit eye dept. 7/1/2019  [facility]  Corrected distance visual acuity was 20/400 in the right eye and 20/50 +2 in the left eye.   Not recorded         Not recorded         Not recorded        Chief Complaint   Patient presents with    Eye Pain     Left eye pain and redness. pt states his skin around the left eye has been red and puffy since his injection last week. a little tender to touch. tried hot and cold compresses but did not get any relief. he did have a betadine prep at his last injection.         HPI     Eye Pain      Additional comments: Left eye pain and redness. pt states his skin   around the left eye has been red and puffy since his injection last week.   a little tender to touch. tried hot and cold compresses but did not get   any relief. he did have a betadine prep at his last injection.               Comments     --Smd/srn od dx. 10/5/15   History of avastin and eylea od, last 1/20/17  --2+ vac cat ou  New Srn os dx and treated with avastin 3/15/19 -- 4/17/19  Eylea OS 7/1/19          Last edited by Vinayak Bah on 7/10/2019  8:58 AM. 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

## 2019-07-22 DIAGNOSIS — K21.9 GASTROESOPHAGEAL REFLUX DISEASE, ESOPHAGITIS PRESENCE NOT SPECIFIED: ICD-10-CM

## 2019-07-22 RX ORDER — OMEPRAZOLE 20 MG/1
CAPSULE, DELAYED RELEASE ORAL
Qty: 90 CAPSULE | Refills: 3 | Status: SHIPPED | OUTPATIENT
Start: 2019-07-22 | End: 2020-07-11 | Stop reason: SDUPTHER

## 2019-07-31 LAB
CHOL/HDLC RATIO: 2.7
CHOLEST SERPL-MSCNC: 147 MG/DL (ref 0–200)
HDLC SERPL-MCNC: 54 MG/DL
LDLC SERPL CALC-MCNC: 71 MG/DL
TRIGL SERPL-MCNC: 115 MG/DL
VLDL CHOLESTEROL: 23 MG/DL

## 2019-08-01 ENCOUNTER — PROCEDURE VISIT (OUTPATIENT)
Dept: OPHTHALMOLOGY | Facility: CLINIC | Age: 84
End: 2019-08-01
Payer: MEDICARE

## 2019-08-01 DIAGNOSIS — H35.3221 EXUDATIVE AGE-RELATED MACULAR DEGENERATION OF LEFT EYE WITH ACTIVE CHOROIDAL NEOVASCULARIZATION: Primary | ICD-10-CM

## 2019-08-01 PROCEDURE — 92134 CPTRZ OPH DX IMG PST SGM RTA: CPT | Mod: PBBFAC | Performed by: OPHTHALMOLOGY

## 2019-08-01 PROCEDURE — 92134 POSTERIOR SEGMENT OCT RETINA (OCULAR COHERENCE TOMOGRAPHY)-BOTH EYES: ICD-10-PCS | Mod: 26,S$PBB,, | Performed by: OPHTHALMOLOGY

## 2019-08-01 PROCEDURE — 67028 INJECTION EYE DRUG: CPT | Mod: S$PBB,LT,, | Performed by: OPHTHALMOLOGY

## 2019-08-01 PROCEDURE — 99499 NO LOS: ICD-10-PCS | Mod: S$PBB,,, | Performed by: OPHTHALMOLOGY

## 2019-08-01 PROCEDURE — 67028 PR INJECT INTRAVITREAL PHARMCOLOGIC: ICD-10-PCS | Mod: S$PBB,LT,, | Performed by: OPHTHALMOLOGY

## 2019-08-01 PROCEDURE — 99499 UNLISTED E&M SERVICE: CPT | Mod: S$PBB,,, | Performed by: OPHTHALMOLOGY

## 2019-08-01 PROCEDURE — 67028 INJECTION EYE DRUG: CPT | Mod: PBBFAC,LT | Performed by: OPHTHALMOLOGY

## 2019-08-01 RX ADMIN — AFLIBERCEPT 2 MG: 40 INJECTION, SOLUTION INTRAVITREAL at 02:08

## 2019-08-01 NOTE — PROGRESS NOTES
===============================  Kelton Mcdaniels,   89 y.o. male   [facility] :7/10/2019   [facility]pt[facility]10[facility]  [facility]  [facility] distance visual acuity was 20/400 in the right eye and 20/30 -2 in the left eye.   Not recorded         Not recorded         Not recorded         Not recorded        Chief Complaint   Patient presents with    Macular Degeneration     Eylea OS          ________________  8/1/2019  HPI     Macular Degeneration      Additional comments: Eylea OS              Comments     Vigamox Prep/NO BETADINE    --Smd/srn od dx. 10/5/15   History of avastin and eylea od, last 1/20/17  --2+ vac cat ou  New Srn os dx and treated with avastin 3/15/19 -- 4/17/19  Eylea OS 7/1/19          Last edited by Lindy Simon on 8/1/2019  1:24 PM. 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           Relevant Medications    aflibercept Soln 2 mg (Completed)    Other Relevant Orders    Posterior Segment OCT Retina-Both eyes    Prior Authorization Order        Last kal Keaneic./glorynritoeant bl;e  haritis   rev vigamoax [pe r today   ostc os better today #6   coitune as getting gbeter       8/1/2019  Diagnosis :  Os srn  Today:   Eylea (afibercept) 2 mg/0.05 ml Intravitreal Injection , OS   Follow up: rtc  1 mo        Instructed to call 24/7 for any worsening of vision. Check Both eyes daily. Gave patient my home phone number.      Procedure  Note:   OS}  Eylea (afibercept) 2 mg/0.05 ml Intravitreal Injection    I have explained the Risks, Benefits and Alternatives of the procedure in detail.  The patient voices understanding and all questions have been answered.  The patient agrees to proceed as discussed.  Xylocaine with Epi 2% subconj bleb   was used for anesthesia.  Topical vigamox  was used for antisepsis.  0.05 cc was  injected 3.7 mm from corneal limbus in the inferotemporal quadrant.  Following injection the IOP was less than thirty (<30) by tonopen.  The eye was then thoroughly irrigated with BSS.  Patient tolerated procedure well.  No complications were observed.  The Patient was educated that mild irritation tonight was normal secondary to topical antispsis use.  Pt was advised to call at any time day or night for pain, redness, or any decline in vision. I gave the patient my home number as well as the clinic on call number. Daily visual checks and Amsler grid testing were reviewed.  ciloxan Antibiotic Drops to be used 4 times daily for 4 days  CRESCENCIO Phillips MD  Procedure ordered: y  Consent: y  Pre auth: y  MAR:y  Opnote: y

## 2019-08-05 ENCOUNTER — DOCUMENTATION ONLY (OUTPATIENT)
Dept: ADMINISTRATIVE | Facility: HOSPITAL | Age: 84
End: 2019-08-05

## 2019-08-09 ENCOUNTER — OFFICE VISIT (OUTPATIENT)
Dept: INTERNAL MEDICINE | Facility: CLINIC | Age: 84
End: 2019-08-09
Payer: MEDICARE

## 2019-08-09 VITALS
HEART RATE: 92 BPM | DIASTOLIC BLOOD PRESSURE: 80 MMHG | BODY MASS INDEX: 27.88 KG/M2 | OXYGEN SATURATION: 94 % | TEMPERATURE: 99 F | WEIGHT: 178 LBS | SYSTOLIC BLOOD PRESSURE: 142 MMHG

## 2019-08-09 DIAGNOSIS — E78.2 MIXED HYPERLIPIDEMIA: ICD-10-CM

## 2019-08-09 DIAGNOSIS — R73.01 IMPAIRED FASTING GLUCOSE: ICD-10-CM

## 2019-08-09 DIAGNOSIS — R06.02 SOB (SHORTNESS OF BREATH): ICD-10-CM

## 2019-08-09 DIAGNOSIS — B02.8 HERPES ZOSTER WITH COMPLICATION: Primary | ICD-10-CM

## 2019-08-09 DIAGNOSIS — I10 ESSENTIAL HYPERTENSION: ICD-10-CM

## 2019-08-09 PROCEDURE — 99213 OFFICE O/P EST LOW 20 MIN: CPT | Mod: S$PBB,,, | Performed by: INTERNAL MEDICINE

## 2019-08-09 PROCEDURE — 99999 PR PBB SHADOW E&M-EST. PATIENT-LVL III: CPT | Mod: PBBFAC,,, | Performed by: INTERNAL MEDICINE

## 2019-08-09 PROCEDURE — 99999 PR PBB SHADOW E&M-EST. PATIENT-LVL III: ICD-10-PCS | Mod: PBBFAC,,, | Performed by: INTERNAL MEDICINE

## 2019-08-09 PROCEDURE — 99213 PR OFFICE/OUTPT VISIT, EST, LEVL III, 20-29 MIN: ICD-10-PCS | Mod: S$PBB,,, | Performed by: INTERNAL MEDICINE

## 2019-08-09 PROCEDURE — 99213 OFFICE O/P EST LOW 20 MIN: CPT | Mod: PBBFAC,PO | Performed by: INTERNAL MEDICINE

## 2019-08-09 RX ORDER — TRAMADOL HYDROCHLORIDE 50 MG/1
50 TABLET ORAL EVERY 6 HOURS PRN
Qty: 50 TABLET | Refills: 0 | Status: SHIPPED | OUTPATIENT
Start: 2019-08-09 | End: 2019-08-27 | Stop reason: SDUPTHER

## 2019-08-09 RX ORDER — VALACYCLOVIR HYDROCHLORIDE 1 G/1
1000 TABLET, FILM COATED ORAL
Qty: 21 TABLET | Refills: 0 | Status: SHIPPED | OUTPATIENT
Start: 2019-08-09 | End: 2019-09-04

## 2019-08-09 RX ORDER — ALBUTEROL SULFATE 90 UG/1
2 AEROSOL, METERED RESPIRATORY (INHALATION) EVERY 4 HOURS PRN
Qty: 9 EACH | Refills: 11 | Status: SHIPPED | OUTPATIENT
Start: 2019-08-09

## 2019-08-09 NOTE — PROGRESS NOTES
HPI:  Patient is 89-year-old man who comes today with complaints of a rash that began on his top of left shoulder yesterday evening.  He began have pain about 2-3 days prior.    Current meds have been verified and updated per the EMR  Exam:BP (!) 142/80   Pulse 92   Temp 99.2 °F (37.3 °C) (Tympanic)   Wt 80.7 kg (178 lb)   SpO2 (!) 94%   BMI 27.88 kg/m²   He has typical herpes zoster rash on top of the left shoulder from the neck down to the shoulder down the very proximal part of the left arm over the deltoid.    Lab Results   Component Value Date    WBC 7.20 03/13/2019    HGB 13.9 (L) 03/13/2019    HCT 44.3 03/13/2019     03/13/2019    CHOL 147 07/31/2019    TRIG 115 07/31/2019    HDL 54 07/31/2019    ALT 13 03/13/2019    AST 16 03/13/2019     03/13/2019    K 4.1 03/13/2019    CL 97 03/13/2019    CREATININE 0.9 03/13/2019    BUN 9 (L) 03/13/2019    CO2 36 (H) 03/13/2019    TSH 2.573 03/13/2019    PSA 3.8 08/15/2011    INR 1.0 12/08/2015    HGBA1C 5.6 03/13/2019       Impression:  Herpes zoster  Patient Active Problem List   Diagnosis    Impaired fasting glucose    Essential hypertension    Esophageal reflux    History of colonic polyps    Lumbar radiculopathy    Exudative age-related macular degeneration of left eye with active choroidal neovascularization    CAD in native artery    Mixed hyperlipidemia    Pulmonary hypertension    TR (tricuspid regurgitation)    Multifocal atrial tachycardia    COPD, severe    Gross hematuria    Exercise hypoxemia    Hypoxemia requiring supplemental oxygen    Anxiety    History of bladder cancer    History of colon cancer    Entropion of left eyelid    BPH (benign prostatic hyperplasia)       Plan:  Orders Placed This Encounter    valACYclovir (VALTREX) 1000 MG tablet    traMADol (ULTRAM) 50 mg tablet     He will take Valtrex and was given tramadol for pain.  He has been told to take 2-3 weeks to resolve.    This note is generated with  speech recognition software and is subject to transcription error and sound alike phrases that may be missed by proofreading.

## 2019-08-27 ENCOUNTER — PATIENT MESSAGE (OUTPATIENT)
Dept: INTERNAL MEDICINE | Facility: CLINIC | Age: 84
End: 2019-08-27

## 2019-08-27 RX ORDER — TRAMADOL HYDROCHLORIDE 50 MG/1
50 TABLET ORAL EVERY 6 HOURS PRN
Qty: 50 TABLET | Refills: 0 | Status: SHIPPED | OUTPATIENT
Start: 2019-08-27

## 2019-08-27 RX ORDER — TRAMADOL HYDROCHLORIDE 50 MG/1
50 TABLET ORAL EVERY 6 HOURS PRN
Qty: 50 TABLET | Refills: 0 | Status: SHIPPED | OUTPATIENT
Start: 2019-08-27 | End: 2019-08-27 | Stop reason: SDUPTHER

## 2019-08-27 NOTE — TELEPHONE ENCOUNTER
----- Message from Jaylin Jett sent at 8/27/2019  3:32 PM CDT -----  Contact: Trey/Lois esposito [phone]  Type:  Pharmacy Calling to Clarify an RX    Name of Caller:Trey  Pharmacy Name:[facility]  Prescription Name: Ultram 50mg   What do they need to clarify?:   Best Call Back Number:[phone]  Additional Information:  States that medical necessity needs to be put on all rx for Ultram that is for over 7 days.      Pt [facility]  06/28/18 0000

## 2019-08-28 ENCOUNTER — PATIENT MESSAGE (OUTPATIENT)
Dept: INTERNAL MEDICINE | Facility: CLINIC | Age: 84
End: 2019-08-28

## 2019-09-03 ENCOUNTER — PATIENT MESSAGE (OUTPATIENT)
Dept: OPHTHALMOLOGY | Facility: CLINIC | Age: 84
End: 2019-09-03

## 2019-09-04 ENCOUNTER — PROCEDURE VISIT (OUTPATIENT)
Dept: OPHTHALMOLOGY | Facility: CLINIC | Age: 84
End: 2019-09-04
Payer: MEDICARE

## 2019-09-04 DIAGNOSIS — H35.3221 EXUDATIVE AGE-RELATED MACULAR DEGENERATION OF LEFT EYE WITH ACTIVE CHOROIDAL NEOVASCULARIZATION: Primary | ICD-10-CM

## 2019-09-04 PROCEDURE — 92134 POSTERIOR SEGMENT OCT RETINA (OCULAR COHERENCE TOMOGRAPHY)-BOTH EYES: ICD-10-PCS | Mod: 26,S$PBB,, | Performed by: OPHTHALMOLOGY

## 2019-09-04 PROCEDURE — 92134 CPTRZ OPH DX IMG PST SGM RTA: CPT | Mod: PBBFAC | Performed by: OPHTHALMOLOGY

## 2019-09-04 PROCEDURE — 99499 NO LOS: ICD-10-PCS | Mod: S$PBB,,, | Performed by: OPHTHALMOLOGY

## 2019-09-04 PROCEDURE — 67028 INJECTION EYE DRUG: CPT | Mod: PBBFAC,LT | Performed by: OPHTHALMOLOGY

## 2019-09-04 PROCEDURE — 99499 UNLISTED E&M SERVICE: CPT | Mod: S$PBB,,, | Performed by: OPHTHALMOLOGY

## 2019-09-04 PROCEDURE — 67028 INJECTION EYE DRUG: CPT | Mod: S$PBB,LT,, | Performed by: OPHTHALMOLOGY

## 2019-09-04 PROCEDURE — 67028 PR INJECT INTRAVITREAL PHARMCOLOGIC: ICD-10-PCS | Mod: S$PBB,LT,, | Performed by: OPHTHALMOLOGY

## 2019-09-04 RX ADMIN — AFLIBERCEPT 2 MG: 40 INJECTION, SOLUTION INTRAVITREAL at 02:09

## 2019-09-04 NOTE — PROGRESS NOTES
===============================  Kelton Mcdaniels,   89 y.o. male   Last visit [facility]: :8/1/2019   Last [facility]pt[facility]  [facility]  Corrected distance visual acuity was 20/400 in the right eye and 20/30 in the left eye.   Not recorded         Not recorded         Not recorded         Not recorded        Chief Complaint   Patient presents with    srn     eylea os          ________________  9/4/2019  HPI     srn      Additional comments: eylea os              Comments     --Smd/srn od dx. 10/5/15   History of avastin and eylea od, last 1/20/17  --2+ vac cat ou  New Srn os dx and treated with avastin 3/15/19 -- 4/17/19  Eylea OS 8/1/19          Last edited by VALERIA White on 9/4/2019  2:17 PM. 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           Relevant Medications    aflibercept Soln 2 mg (Start on 9/4/2019  2:45 PM)    Other Relevant Orders    Prior Authorization Order    Posterior Segment OCT Retina-Both eyes (Completed)        Os srn  im o better - fgetting better    rtc 1 m       9/4/2019  Diagnosis :  Os srn3  Today:   Eylea (afibercept) 2 mg/0.05 ml Intravitreal Injection , OS   Follow up: rtc 1 mo        Instructed to call 24/7 for any worsening of vision. Check Both eyes daily. Gave patient my home phone number.      Procedure  Note:   OS}  Eylea (afibercept) 2 mg/0.05 ml Intravitreal Injection    I have explained the Risks, Benefits and Alternatives of the procedure in detail.  The patient voices understanding and all questions have been answered.  The patient agrees to proceed as discussed.  Xylocaine with Epi 2%  subconj bleb  was used for anesthesia.  Topical vigamox was used for antisepsis.  0.05 cc was  injected 3.7 mm from corneal limbus in the inferotemporal  quadrant.  Following injection the IOP was less than thirty (<30) by tonopen.  The eye was then thoroughly irrigated with BSS.  Patient tolerated procedure well.  No complications were observed.  The Patient was educated that mild irritation tonight was normal secondary to topical antispsis use.  Pt was advised to call at any time day or night for pain, redness, or any decline in vision. I gave the patient my home number as well as the clinic on call number. Daily visual checks and Amsler grid testing were reviewed.  ciloxan Antibiotic Drops to be used 4 times daily for 4 days  CRESCENCIO Phillips MD  Procedure ordered: y  Consent: y  Pre auth: y  MAR:y  Opnote: y  Charge capture:y      .       ===========================

## 2019-09-13 ENCOUNTER — LAB VISIT (OUTPATIENT)
Dept: LAB | Facility: HOSPITAL | Age: 84
End: 2019-09-13
Attending: INTERNAL MEDICINE
Payer: MEDICARE

## 2019-09-13 DIAGNOSIS — I10 ESSENTIAL HYPERTENSION: ICD-10-CM

## 2019-09-13 DIAGNOSIS — R73.01 IMPAIRED FASTING GLUCOSE: ICD-10-CM

## 2019-09-13 LAB
ANION GAP SERPL CALC-SCNC: 10 MMOL/L (ref 8–16)
BUN SERPL-MCNC: 8 MG/DL (ref 10–30)
CALCIUM SERPL-MCNC: 9.4 MG/DL (ref 8.7–10.5)
CHLORIDE SERPL-SCNC: 89 MMOL/L (ref 95–110)
CHOLEST SERPL-MCNC: 151 MG/DL (ref 120–199)
CHOLEST/HDLC SERPL: 3.3 {RATIO} (ref 2–5)
CO2 SERPL-SCNC: 33 MMOL/L (ref 23–29)
CREAT SERPL-MCNC: 0.8 MG/DL (ref 0.5–1.4)
EST. GFR  (AFRICAN AMERICAN): >60 ML/MIN/1.73 M^2
EST. GFR  (NON AFRICAN AMERICAN): >60 ML/MIN/1.73 M^2
ESTIMATED AVG GLUCOSE: 120 MG/DL (ref 68–131)
GLUCOSE SERPL-MCNC: 116 MG/DL (ref 70–110)
HBA1C MFR BLD HPLC: 5.8 % (ref 4–5.6)
HDLC SERPL-MCNC: 46 MG/DL (ref 40–75)
HDLC SERPL: 30.5 % (ref 20–50)
LDLC SERPL CALC-MCNC: 83.2 MG/DL (ref 63–159)
NONHDLC SERPL-MCNC: 105 MG/DL
POTASSIUM SERPL-SCNC: 3.7 MMOL/L (ref 3.5–5.1)
SODIUM SERPL-SCNC: 132 MMOL/L (ref 136–145)
TRIGL SERPL-MCNC: 109 MG/DL (ref 30–150)

## 2019-09-13 PROCEDURE — 83036 HEMOGLOBIN GLYCOSYLATED A1C: CPT

## 2019-09-13 PROCEDURE — 80048 BASIC METABOLIC PNL TOTAL CA: CPT

## 2019-09-13 PROCEDURE — 36415 COLL VENOUS BLD VENIPUNCTURE: CPT | Mod: PO

## 2019-09-13 PROCEDURE — 80061 LIPID PANEL: CPT

## 2019-09-17 RX ORDER — SIMVASTATIN 40 MG/1
TABLET, FILM COATED ORAL
Qty: 90 TABLET | Refills: 3 | Status: SHIPPED | OUTPATIENT
Start: 2019-09-17 | End: 2020-10-01 | Stop reason: SDUPTHER

## 2019-09-20 ENCOUNTER — OFFICE VISIT (OUTPATIENT)
Dept: INTERNAL MEDICINE | Facility: CLINIC | Age: 84
End: 2019-09-20
Payer: MEDICARE

## 2019-09-20 VITALS
BODY MASS INDEX: 26.78 KG/M2 | OXYGEN SATURATION: 96 % | WEIGHT: 170.63 LBS | HEIGHT: 67 IN | BODY MASS INDEX: 26.78 KG/M2 | OXYGEN SATURATION: 96 % | WEIGHT: 170.63 LBS | HEIGHT: 67 IN | HEART RATE: 91 BPM | DIASTOLIC BLOOD PRESSURE: 72 MMHG | SYSTOLIC BLOOD PRESSURE: 108 MMHG | SYSTOLIC BLOOD PRESSURE: 108 MMHG | HEART RATE: 91 BPM | DIASTOLIC BLOOD PRESSURE: 72 MMHG | TEMPERATURE: 98 F | TEMPERATURE: 98 F

## 2019-09-20 DIAGNOSIS — R09.02 HYPOXEMIA REQUIRING SUPPLEMENTAL OXYGEN: ICD-10-CM

## 2019-09-20 DIAGNOSIS — R73.01 IMPAIRED FASTING GLUCOSE: ICD-10-CM

## 2019-09-20 DIAGNOSIS — E78.2 MIXED HYPERLIPIDEMIA: ICD-10-CM

## 2019-09-20 DIAGNOSIS — Z00.00 ENCOUNTER FOR PREVENTIVE HEALTH EXAMINATION: Primary | ICD-10-CM

## 2019-09-20 DIAGNOSIS — M48.061 STENOSIS, SPINAL, LUMBAR: ICD-10-CM

## 2019-09-20 DIAGNOSIS — I10 ESSENTIAL HYPERTENSION: ICD-10-CM

## 2019-09-20 DIAGNOSIS — I07.1 TRICUSPID VALVE INSUFFICIENCY, UNSPECIFIED ETIOLOGY: ICD-10-CM

## 2019-09-20 DIAGNOSIS — J44.9 COPD, SEVERE: Chronic | ICD-10-CM

## 2019-09-20 DIAGNOSIS — I27.20 PULMONARY HYPERTENSION: ICD-10-CM

## 2019-09-20 DIAGNOSIS — M51.36 DDD (DEGENERATIVE DISC DISEASE), LUMBAR: ICD-10-CM

## 2019-09-20 DIAGNOSIS — B02.29 POST HERPETIC NEURALGIA: ICD-10-CM

## 2019-09-20 DIAGNOSIS — I25.10 CAD IN NATIVE ARTERY: ICD-10-CM

## 2019-09-20 DIAGNOSIS — N40.0 BENIGN PROSTATIC HYPERPLASIA, UNSPECIFIED WHETHER LOWER URINARY TRACT SYMPTOMS PRESENT: ICD-10-CM

## 2019-09-20 DIAGNOSIS — I10 ESSENTIAL HYPERTENSION: Primary | ICD-10-CM

## 2019-09-20 DIAGNOSIS — M54.16 LUMBAR RADICULOPATHY: ICD-10-CM

## 2019-09-20 DIAGNOSIS — K21.9 GASTROESOPHAGEAL REFLUX DISEASE, ESOPHAGITIS PRESENCE NOT SPECIFIED: ICD-10-CM

## 2019-09-20 DIAGNOSIS — H35.3221 EXUDATIVE AGE-RELATED MACULAR DEGENERATION OF LEFT EYE WITH ACTIVE CHOROIDAL NEOVASCULARIZATION: ICD-10-CM

## 2019-09-20 DIAGNOSIS — R09.02 EXERCISE HYPOXEMIA: Chronic | ICD-10-CM

## 2019-09-20 DIAGNOSIS — M54.16 LEFT LUMBAR RADICULOPATHY: ICD-10-CM

## 2019-09-20 DIAGNOSIS — Z99.81 HYPOXEMIA REQUIRING SUPPLEMENTAL OXYGEN: ICD-10-CM

## 2019-09-20 DIAGNOSIS — I47.19 MULTIFOCAL ATRIAL TACHYCARDIA: ICD-10-CM

## 2019-09-20 PROCEDURE — 99999 PR PBB SHADOW E&M-EST. PATIENT-LVL III: ICD-10-PCS | Mod: PBBFAC,,, | Performed by: INTERNAL MEDICINE

## 2019-09-20 PROCEDURE — G0439 PR MEDICARE ANNUAL WELLNESS SUBSEQUENT VISIT: ICD-10-PCS | Mod: S$GLB,,, | Performed by: NURSE PRACTITIONER

## 2019-09-20 PROCEDURE — 99215 OFFICE O/P EST HI 40 MIN: CPT | Mod: PBBFAC,27,PO,25 | Performed by: NURSE PRACTITIONER

## 2019-09-20 PROCEDURE — 90662 IIV NO PRSV INCREASED AG IM: CPT | Mod: PBBFAC,PO

## 2019-09-20 PROCEDURE — 99214 PR OFFICE/OUTPT VISIT, EST, LEVL IV, 30-39 MIN: ICD-10-PCS | Mod: 25,S$PBB,, | Performed by: INTERNAL MEDICINE

## 2019-09-20 PROCEDURE — 99999 PR PBB SHADOW E&M-EST. PATIENT-LVL V: ICD-10-PCS | Mod: PBBFAC,,, | Performed by: NURSE PRACTITIONER

## 2019-09-20 PROCEDURE — 99214 OFFICE O/P EST MOD 30 MIN: CPT | Mod: 25,S$PBB,, | Performed by: INTERNAL MEDICINE

## 2019-09-20 PROCEDURE — 99999 PR PBB SHADOW E&M-EST. PATIENT-LVL V: CPT | Mod: PBBFAC,,, | Performed by: NURSE PRACTITIONER

## 2019-09-20 PROCEDURE — 99213 OFFICE O/P EST LOW 20 MIN: CPT | Mod: PBBFAC,PO,25 | Performed by: INTERNAL MEDICINE

## 2019-09-20 PROCEDURE — G0439 PPPS, SUBSEQ VISIT: HCPCS | Mod: S$GLB,,, | Performed by: NURSE PRACTITIONER

## 2019-09-20 PROCEDURE — 99999 PR PBB SHADOW E&M-EST. PATIENT-LVL III: CPT | Mod: PBBFAC,,, | Performed by: INTERNAL MEDICINE

## 2019-09-20 RX ORDER — GABAPENTIN 100 MG/1
100 CAPSULE ORAL 3 TIMES DAILY
Qty: 90 CAPSULE | Refills: 11 | Status: SHIPPED | OUTPATIENT
Start: 2019-09-20 | End: 2020-08-03

## 2019-09-20 NOTE — PATIENT INSTRUCTIONS
Counseling and Referral of Other Preventative  (Italic type indicates deductible and co-insurance are waived)    Patient Name: Kelton Mcdaniels  Today's Date: 9/20/2019    Health Maintenance       Date Due Completion Date    TETANUS VACCINE 01/03/1946 ---    Aspirin/Antiplatelet Therapy 01/03/1946 ---    Shingles Vaccine (1 of 2) 01/03/1978 ---    Influenza Vaccine (1) 09/01/2019 11/21/2018    Lipid Panel 09/13/2020 9/13/2019        No orders of the defined types were placed in this encounter.    The following information is provided to all patients.  This information is to help you find resources for any of the problems found today that may be affecting your health:                Living healthy guide: www.[facility].[place].gov      Understanding Diabetes: www.diabetes.org      Eating healthy: www.cdc.gov/healthyweight      [facility] home safety checklist: www.cdc.gov/steadi/patient.html      Agency on Aging: www.goea.[place].[facility]      Alcoholics anonymous (AA): www.aa.org      Physical Activity: www.eli.nih.gov/eh8aops      Tobacco use: www.quitwithusla.org

## 2019-09-20 NOTE — PROGRESS NOTES
"Kelton Mcdaniels presented for a  Medicare AWV and comprehensive Health Risk Assessment today. The following components were reviewed and updated:    · Medical history  · Family History  · Social history  · Allergies and Current Medications  · Health Risk Assessment  · Health Maintenance  · Care Team     ** See Completed Assessments for Annual Wellness Visit within the encounter summary.**       The following assessments were completed:  · Living Situation  · CAGE  · Depression Screening  · Timed Get Up and Go  · Whisper Test  · Cognitive Function Screening  · Nutrition Screening  · ADL Screening  · PAQ Screening    Vitals:    09/20/19 0956   BP: 108/72   BP Location: Left arm   Pulse: 91   Temp: 97.9 °F (36.6 °C)   TempSrc: Tympanic   SpO2: 96%   Weight: 77.4 kg (170 lb 10.2 oz)   Height: 5' 7" (1.702 m)     Body mass index is 26.73 kg/m².  Physical Exam   Constitutional: He is oriented to person, place, and time. He appears well-developed and well-nourished. No distress.   HENT:   Head: Normocephalic and atraumatic.   Neck: Normal range of motion. Neck supple. No JVD present. No tracheal deviation present. No thyromegaly present.   No carotid bruits   Cardiovascular: Normal rate, regular rhythm and normal heart sounds. Exam reveals no gallop and no friction rub.   No murmur heard.  Pulmonary/Chest: No stridor. He is in respiratory distress. He has no wheezes. He has no rales. He exhibits no tenderness.   Bilateral decreased breath sounds, mild resp distress, sob    Abdominal: Soft. Bowel sounds are normal. He exhibits no mass. There is no tenderness. There is no rebound and no guarding. No hernia.   Musculoskeletal: He exhibits no edema or tenderness.   Lymphadenopathy:     He has no cervical adenopathy.   Neurological: He is alert and oriented to person, place, and time. No cranial nerve deficit.   Skin: Skin is warm and dry. He is not diaphoretic.   Left shoulder   Psychiatric: He has a normal mood and affect. His " behavior is normal.         Diagnoses and health risks identified today and associated recommendations/orders:    1. Encounter for preventive health examination  Screenings performed, as noted above.  Personal preventative testing needs reviewed.     2. COPD, severe  Monitored/treated on meds, continue the same tx, following up with pcp today    3. Exercise hypoxemia  Monitored/treated on meds, continue the same tx, has decreased his exercise lately due to the shingles pain    4. Hypoxemia requiring supplemental oxygen  Monitored/treated on meds, continue the same tx, stable    5. Pulmonary hypertension  Monitored/treated on meds, continue the same tx, stable    6. CAD in native artery  Monitored/treated on meds, continue the same tx, stable, no chest pain    7. Essential hypertension  Monitored/treated on meds, continue the same tx, stable    8. Mixed hyperlipidemia  Monitored/treated on meds, continue the same tx, stable, last LDL 83.2    9. Multifocal atrial tachycardia  Monitored/treated on meds, continue the same tx, stable, normal rate today    10. Tricuspid valve insufficiency, unspecified etiology  Monitored/treated on meds, continue the same tx, stable    11. Benign prostatic hyperplasia, unspecified whether lower urinary tract symptoms present  Monitored/treated on meds, continue the same tx, stable, sees Dr Ansari    12. Impaired fasting glucose  Monitored/treated on meds, continue the same tx, stable, last glucose 116    13. Gastroesophageal reflux disease, esophagitis presence not specified  Monitored/treated on meds, continue the same tx, stable    14. Lumbar radiculopathy  Monitored/treated on meds, continue the same tx, stable    15. Exudative age-related macular degeneration of left eye with active choroidal neovascularization  Monitored/treated on meds, continue the same tx, stable, sees Dr Phillips for eye injections      Provided Kelton with a 5-10 year written screening schedule and personal  prevention plan. Recommendations were developed using the USPSTF age appropriate recommendations. Education, counseling, and referrals were provided as needed. After Visit Summary printed and given to patient which includes a list of additional screenings\tests needed.    No follow-ups on file.    Roman Meier, NP

## 2019-09-20 NOTE — PROGRESS NOTES
"HPI:  Patient is a 89-year-old man who comes in today for follow-up of his end-stage emphysema, hypertension, and impaired fasting glucose.  Patient at this time primarily is complaining of persistent neuropathic pain in his left shoulder/lateral left neck where he had shingles about a month ago.  He states his his extremely intense.  He denies any change in his chronic dyspnea.  He does have a lot of mucus that he coughs up all day long.  There been no other complaints    Current meds have been verified and updated per the EMR  Exam:/72 (BP Location: Right arm, Patient Position: Sitting, BP Method: Medium (Manual))   Pulse 91   Temp 97.9 °F (36.6 °C) (Tympanic)   Ht 5' 7" (1.702 m)   Wt 77.4 kg (170 lb 10.2 oz)   SpO2 96%   BMI 26.73 kg/m²   Chest clear  Cardiovascular regular rate and rhythm without murmur gallop or rub    Lab Results   Component Value Date    WBC 7.20 03/13/2019    HGB 13.9 (L) 03/13/2019    HCT 44.3 03/13/2019     03/13/2019    CHOL 151 09/13/2019    TRIG 109 09/13/2019    HDL 46 09/13/2019    ALT 13 03/13/2019    AST 16 03/13/2019     (L) 09/13/2019    K 3.7 09/13/2019    CL 89 (L) 09/13/2019    CREATININE 0.8 09/13/2019    BUN 8 (L) 09/13/2019    CO2 33 (H) 09/13/2019    TSH 2.573 03/13/2019    PSA 3.8 08/15/2011    INR 1.0 12/08/2015    HGBA1C 5.8 (H) 09/13/2019       Impression:  Post herpetic neuralgia  Other medical problems below, stable  Patient Active Problem List   Diagnosis    Impaired fasting glucose    Essential hypertension    Esophageal reflux    History of colonic polyps    Lumbar radiculopathy    Exudative age-related macular degeneration of left eye with active choroidal neovascularization    CAD in native artery    Mixed hyperlipidemia    Pulmonary hypertension    TR (tricuspid regurgitation)    Multifocal atrial tachycardia    COPD, severe    Gross hematuria    Exercise hypoxemia    Hypoxemia requiring supplemental oxygen    Anxiety "    History of bladder cancer    History of colon cancer    Entropion of left eyelid    BPH (benign prostatic hyperplasia)       Plan:  Orders Placed This Encounter    Hemoglobin A1c    Comprehensive metabolic panel    Lipid panel    TSH    CBC auto differential    gabapentin (NEURONTIN) 100 MG capsule     He was started on Neurontin 100 mg I did day for 1 week and then to titrate up to really give pain control.  He has was told he can increase it by 1 capsule a day every 3-4 days.  He should not exceed 6 capsules a day.  Patient will see me again in 6 months with above lab work.  He was given high-dose influenza vaccine.    This note is generated with speech recognition software and is subject to transcription error and sound alike phrases that may be missed by proofreading.

## 2019-09-30 ENCOUNTER — TELEPHONE (OUTPATIENT)
Dept: OPHTHALMOLOGY | Facility: CLINIC | Age: 84
End: 2019-09-30

## 2019-09-30 NOTE — TELEPHONE ENCOUNTER
----- Message from Mary Kuo sent at 9/30/2019 11:44 AM CDT -----  Contact: pt/wife  Patient need to reschedule his 10/3/19 proc with Dr. Phillips. Please call at Ph .[phone] (home

## 2019-10-03 ENCOUNTER — PROCEDURE VISIT (OUTPATIENT)
Dept: OPHTHALMOLOGY | Facility: CLINIC | Age: 84
End: 2019-10-03
Payer: MEDICARE

## 2019-10-03 DIAGNOSIS — H35.3221 EXUDATIVE AGE-RELATED MACULAR DEGENERATION OF LEFT EYE WITH ACTIVE CHOROIDAL NEOVASCULARIZATION: Primary | ICD-10-CM

## 2019-10-03 PROCEDURE — 99499 UNLISTED E&M SERVICE: CPT | Mod: S$PBB,,, | Performed by: OPHTHALMOLOGY

## 2019-10-03 PROCEDURE — 67028 PR INJECT INTRAVITREAL PHARMCOLOGIC: ICD-10-PCS | Mod: S$PBB,LT,, | Performed by: OPHTHALMOLOGY

## 2019-10-03 PROCEDURE — 67028 INJECTION EYE DRUG: CPT | Mod: S$PBB,LT,, | Performed by: OPHTHALMOLOGY

## 2019-10-03 PROCEDURE — 67028 INJECTION EYE DRUG: CPT | Mod: PBBFAC,LT | Performed by: OPHTHALMOLOGY

## 2019-10-03 PROCEDURE — 99499 NO LOS: ICD-10-PCS | Mod: S$PBB,,, | Performed by: OPHTHALMOLOGY

## 2019-10-03 RX ADMIN — AFLIBERCEPT 2 MG: 40 INJECTION, SOLUTION INTRAVITREAL at 01:10

## 2019-10-03 NOTE — PROGRESS NOTES
===============================  Kelton Mcdaniels,   89 y.o. male   Last visit [facility]: :9/4/2019   [facility]  [facility]  Uncorrected distance visual acuity was 20/400 in the right eye and 20/200 in the left eye.   Not recorded         Not recorded         Not recorded         Not recorded        Chief Complaint   Patient presents with    SRN     pt here for eylea OS. pt states no problems since his last visit. did not bring his glasses today.           ________________  10/3/2019  HPI     SRN      Additional comments: pt here for eylea OS. pt states no problems since   his last visit. did not bring his glasses today.               Comments     Vigamox Prep/NO BETADINE    --Smd/srn od dx. 10/5/15   History of avastin and eylea od, last 1/20/17  --2+ vac cat ou  New Srn os dx and treated with avastin 3/15/19 -- 4/17/19  Eylea OS 9/4/19          Last edited by Vinayak Bah on 10/3/2019 11:12 AM. 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           Relevant Medications    aflibercept Soln 2 mg (Start on 10/3/2019  1:15 PM)    Other Relevant Orders    Prior Authorization Order        Os srn  Oct better     10/3/2019  Diagnosis :  osn srn   Today:   Eylea (afibercept) 2 mg/0.05 ml Intravitreal Injection , OS   Follow up: rtc  1 mo eyel then cosnder begin txx           Instructed to call 24/7 for any worsening of vision. Check Both eyes daily. Gave patient my home phone number.      Procedure  Note:   OS}  Eylea (afibercept) 2 mg/0.05 ml Intravitreal Injection    I have explained the Risks, Benefits and Alternatives of the procedure in detail.  The patient voices understanding and all questions have been answered.  The patient agrees to proceed as discussed.  Xylocaine with Epi 2%  subconj  bleb  was used for anesthesia.  Topical betadine was used for antisepsis.  0.05 cc was  injected 3.7 mm from corneal limbus in the inferotemporal quadrant.  Following injection the IOP was less than thirty (<30) by tonopen.  The eye was then thoroughly irrigated with BSS.  Patient tolerated procedure well.  No complications were observed.  The Patient was educated that mild irritation tonight was normal secondary to topical antispsis use.  Pt was advised to call at any time day or night for pain, redness, or any decline in vision. I gave the patient my home number as well as the clinic on call number. Daily visual checks and Amsler grid testing were reviewed.  ciloxan Antibiotic Drops to be used 4 times daily for 4 days  CRESCENCIO Phillips MD  Procedure ordered: y  Consent: y  Pre auth: y  MAR:y  Opnote: y  Charge capture:y           ===========================

## 2019-10-12 ENCOUNTER — HOSPITAL ENCOUNTER (EMERGENCY)
Facility: HOSPITAL | Age: 84
Discharge: HOME | End: 2019-10-12
Attending: EMERGENCY MEDICINE
Payer: MEDICARE

## 2019-10-12 VITALS
BODY MASS INDEX: 25.95 KG/M2 | HEART RATE: 97 BPM | DIASTOLIC BLOOD PRESSURE: 77 MMHG | RESPIRATION RATE: 32 BRPM | OXYGEN SATURATION: 100 % | HEIGHT: 68 IN | SYSTOLIC BLOOD PRESSURE: 144 MMHG | TEMPERATURE: 98 F

## 2019-10-12 DIAGNOSIS — R10.2 ABDOMINAL PAIN, SUPRAPUBIC: Primary | ICD-10-CM

## 2019-10-12 DIAGNOSIS — R10.9 ABDOMINAL PAIN: ICD-10-CM

## 2019-10-12 DIAGNOSIS — J44.9 COPD (CHRONIC OBSTRUCTIVE PULMONARY DISEASE): ICD-10-CM

## 2019-10-12 LAB
ALBUMIN SERPL BCP-MCNC: 3.5 G/DL (ref 3.5–5.2)
ALP SERPL-CCNC: 55 U/L (ref 55–135)
ALT SERPL W/O P-5'-P-CCNC: 12 U/L (ref 10–44)
ANION GAP SERPL CALC-SCNC: 9 MMOL/L (ref 8–16)
AST SERPL-CCNC: 12 U/L (ref 10–40)
BASOPHILS # BLD AUTO: 0.02 K/UL (ref 0–0.2)
BASOPHILS NFR BLD: 0.2 % (ref 0–1.9)
BILIRUB SERPL-MCNC: 0.5 MG/DL (ref 0.1–1)
BILIRUB UR QL STRIP: NEGATIVE
BUN SERPL-MCNC: 11 MG/DL (ref 10–30)
CALCIUM SERPL-MCNC: 8.9 MG/DL (ref 8.7–10.5)
CHLORIDE SERPL-SCNC: 91 MMOL/L (ref 95–110)
CLARITY UR: CLEAR
CO2 SERPL-SCNC: 30 MMOL/L (ref 23–29)
COLOR UR: YELLOW
CREAT SERPL-MCNC: 0.7 MG/DL (ref 0.5–1.4)
DIFFERENTIAL METHOD: ABNORMAL
EOSINOPHIL # BLD AUTO: 0.3 K/UL (ref 0–0.5)
EOSINOPHIL NFR BLD: 2.5 % (ref 0–8)
ERYTHROCYTE [DISTWIDTH] IN BLOOD BY AUTOMATED COUNT: 12.3 % (ref 11.5–14.5)
EST. GFR  (AFRICAN AMERICAN): >60 ML/MIN/1.73 M^2
EST. GFR  (NON AFRICAN AMERICAN): >60 ML/MIN/1.73 M^2
GLUCOSE SERPL-MCNC: 120 MG/DL (ref 70–110)
GLUCOSE UR QL STRIP: NEGATIVE
HCT VFR BLD AUTO: 36.4 % (ref 40–54)
HGB BLD-MCNC: 12.5 G/DL (ref 14–18)
HGB UR QL STRIP: ABNORMAL
IMM GRANULOCYTES # BLD AUTO: 0.03 K/UL (ref 0–0.04)
IMM GRANULOCYTES NFR BLD AUTO: 0.3 % (ref 0–0.5)
KETONES UR QL STRIP: NEGATIVE
LEUKOCYTE ESTERASE UR QL STRIP: NEGATIVE
LYMPHOCYTES # BLD AUTO: 2.3 K/UL (ref 1–4.8)
LYMPHOCYTES NFR BLD: 21.6 % (ref 18–48)
MCH RBC QN AUTO: 32.2 PG (ref 27–31)
MCHC RBC AUTO-ENTMCNC: 34.3 G/DL (ref 32–36)
MCV RBC AUTO: 94 FL (ref 82–98)
MICROSCOPIC COMMENT: NORMAL
MONOCYTES # BLD AUTO: 0.8 K/UL (ref 0.3–1)
MONOCYTES NFR BLD: 7.6 % (ref 4–15)
NEUTROPHILS # BLD AUTO: 7.1 K/UL (ref 1.8–7.7)
NEUTROPHILS NFR BLD: 67.8 % (ref 38–73)
NITRITE UR QL STRIP: NEGATIVE
NRBC BLD-RTO: 0 /100 WBC
PH UR STRIP: 7 [PH] (ref 5–8)
PLATELET # BLD AUTO: 186 K/UL (ref 150–350)
PMV BLD AUTO: 9.5 FL (ref 9.2–12.9)
POTASSIUM SERPL-SCNC: 3.5 MMOL/L (ref 3.5–5.1)
PROT SERPL-MCNC: 6.4 G/DL (ref 6–8.4)
PROT UR QL STRIP: NEGATIVE
RBC # BLD AUTO: 3.88 M/UL (ref 4.6–6.2)
RBC #/AREA URNS HPF: 1 /HPF (ref 0–4)
SODIUM SERPL-SCNC: 130 MMOL/L (ref 136–145)
SP GR UR STRIP: <=1.005 (ref 1–1.03)
URN SPEC COLLECT METH UR: ABNORMAL
UROBILINOGEN UR STRIP-ACNC: 1 EU/DL
WBC # BLD AUTO: 10.52 K/UL (ref 3.9–12.7)

## 2019-10-12 PROCEDURE — 85025 COMPLETE CBC W/AUTO DIFF WBC: CPT

## 2019-10-12 PROCEDURE — 99285 EMERGENCY DEPT VISIT HI MDM: CPT | Mod: 25

## 2019-10-12 PROCEDURE — 93010 ELECTROCARDIOGRAM REPORT: CPT | Mod: ,,, | Performed by: INTERNAL MEDICINE

## 2019-10-12 PROCEDURE — 80053 COMPREHEN METABOLIC PANEL: CPT

## 2019-10-12 PROCEDURE — 93005 ELECTROCARDIOGRAM TRACING: CPT

## 2019-10-12 PROCEDURE — 81000 URINALYSIS NONAUTO W/SCOPE: CPT

## 2019-10-12 PROCEDURE — 36415 COLL VENOUS BLD VENIPUNCTURE: CPT

## 2019-10-12 PROCEDURE — 93010 EKG 12-LEAD: ICD-10-PCS | Mod: ,,, | Performed by: INTERNAL MEDICINE

## 2019-10-13 NOTE — ED PROVIDER NOTES
SCRIBE #1 NOTE: I, Vadim Londono, am scribing for, and in the presence of, Pietro Menendez MD. I have scribed the entire note.       History     Chief Complaint   Patient presents with    Urinary Retention     pt reports not being able to urinate at all today     Review of patient's allergies indicates:   Allergen Reactions    Betadine [povidone-iodine] Swelling and Rash     Topical Ophthalmic 5% Betadine solution         History of Present Illness     HPI    10/12/2019, 8:46 PM  History obtained from the patient      History of Present Illness: Kelton Mcdaniels is a 89 y.o. male patient with a PMHx of benign prostatic hyperplasia who presents to the Emergency Department for evaluation of urinary retention which onset gradually 16 hours ago. Symptoms are constant and moderate in severity. No mitigating or exacerbating factors reported. Associated sxs include lower abd pain. Patient denies any fever, chills, flank pain, urinary urgency, dysuria, and all other sxs at this time. No prior Tx reported. No further complaints or concerns at this time. Pt states that he attempted to self cath without relief.      Arrival mode: Personal transportation    PCP: Sunny Plummer MD        Past Medical History:  Past Medical History:   Diagnosis Date    BPH (benign prostatic hyperplasia)     Cataract     Chronic airway obstruction, not elsewhere classified     Coronary atherosclerosis of unspecified type of vessel, native or graft     Esophageal reflux     Essential hypertension     History of bladder cancer     History of colon cancer     Hyperlipidemia LDL goal <70     Impaired fasting glucose     Macular degeneration     Post herpetic neuralgia     Pulmonary hypertension 12/18/2015    TR (tricuspid regurgitation) 12/18/2015       Past Surgical History:  Past Surgical History:   Procedure Laterality Date    BACK SURGERY      COLON SURGERY      CORONARY ARTERY BYPASS GRAFT      partial coloectomy for  large polyp      turbt           Family History:  Family History   Problem Relation Age of Onset    Heart failure Mother     Diabetes Father     Cancer Brother     Asthma Paternal Uncle        Social History:  Social History     Tobacco Use    Smoking status: Former Smoker     Packs/day: 2.00     Years: 70.00     Pack years: 140.00     Types: Cigarettes    Smokeless tobacco: Never Used   Substance and Sexual Activity    Alcohol use: No     Frequency: 2-3 times a week     Drinks per session: 1 or 2     Binge frequency: Never    Drug use: No    Sexual activity: Never        Review of Systems     Review of Systems   Constitutional: Negative for chills and fever.   HENT: Negative for sore throat.    Respiratory: Negative for shortness of breath.    Cardiovascular: Negative for chest pain.   Gastrointestinal: Positive for abdominal pain (lower). Negative for nausea.   Genitourinary: Negative for dysuria, flank pain and urgency.        + urinary retention   Musculoskeletal: Negative for back pain.   Skin: Negative for rash.   Neurological: Negative for weakness.   Hematological: Does not bruise/bleed easily.        Physical Exam     Initial Vitals [10/12/19 1941]   BP Pulse Resp Temp SpO2   (!) 127/91 93 (!) 25 98.4 °F (36.9 °C) (!) 94 %      MAP       --          Physical Exam  Nursing Notes and Vital Signs Reviewed.  Constitutional: Patient is in no acute distress. Elderly.  Head: Atraumatic. Normocephalic.  Eyes: PERRL. EOM intact. Conjunctivae are not pale. No scleral icterus.  ENT: Mucous membranes are moist. Oropharynx is clear and symmetric.    Neck: Supple. Full ROM. No lymphadenopathy.  Cardiovascular: Regular rate. Regular rhythm. No murmurs, rubs, or gallops. Distal pulses are 2+ and symmetric.  Pulmonary/Chest: No respiratory distress. Clear to auscultation bilaterally. No wheezing or rales.  Abdominal: Soft and non-distended.  There is no tenderness.  No rebound, guarding, or rigidity. Good bowel  "sounds.  Genitourinary: No CVA tenderness. Mild suprapubic tenderness  Musculoskeletal: Moves all extremities. No obvious deformities. No calf tenderness.  Skin: Warm and dry.  Neurological:  Alert, awake, and appropriate.  Normal speech.  No acute focal neurological deficits are appreciated.  Psychiatric: Normal affect. Good eye contact. Appropriate in content.     ED Course   Procedures  ED Vital Signs:  Vitals:    10/12/19 1941 10/12/19 2033 10/12/19 2035 10/12/19 2120   BP: (!) 127/91 121/74     Pulse: 93 81 82 80   Resp: (!) 25 (!) 32  (!) 32   Temp: 98.4 °F (36.9 °C)      TempSrc: Oral      SpO2: (!) 94% 100%  100%   Height: 5' 8" (1.727 m)          Abnormal Lab Results:  Labs Reviewed   URINALYSIS, REFLEX TO URINE CULTURE - Abnormal; Notable for the following components:       Result Value    Specific Gravity, UA <=1.005 (*)     Occult Blood UA 2+ (*)     All other components within normal limits    Narrative:     Preferred Collection Type->Urine, Clean Catch   CBC W/ AUTO DIFFERENTIAL - Abnormal; Notable for the following components:    RBC 3.88 (*)     Hemoglobin 12.5 (*)     Hematocrit 36.4 (*)     Mean Corpuscular Hemoglobin 32.2 (*)     All other components within normal limits   COMPREHENSIVE METABOLIC PANEL - Abnormal; Notable for the following components:    Sodium 130 (*)     Chloride 91 (*)     CO2 30 (*)     Glucose 120 (*)     All other components within normal limits   URINALYSIS MICROSCOPIC    Narrative:     Preferred Collection Type->Urine, Clean Catch        All Lab Results:  Results for orders placed or performed during the hospital encounter of 10/12/19   Urinalysis, Reflex to Urine Culture Urine, Clean Catch   Result Value Ref Range    Specimen UA Urine, Clean Catch     Color, UA Yellow Yellow, Straw, Purnima    Appearance, UA Clear Clear    pH, UA 7.0 5.0 - 8.0    Specific Gravity, UA <=1.005 (A) 1.005 - 1.030    Protein, UA Negative Negative    Glucose, UA Negative Negative    Ketones, UA " Negative Negative    Bilirubin (UA) Negative Negative    Occult Blood UA 2+ (A) Negative    Nitrite, UA Negative Negative    Urobilinogen, UA 1.0 <2.0 EU/dL    Leukocytes, UA Negative Negative   Urinalysis Microscopic   Result Value Ref Range    RBC, UA 1 0 - 4 /hpf    Microscopic Comment SEE COMMENT    CBC auto differential   Result Value Ref Range    WBC 10.52 3.90 - 12.70 K/uL    RBC 3.88 (L) 4.60 - 6.20 M/uL    Hemoglobin 12.5 (L) 14.0 - 18.0 g/dL    Hematocrit 36.4 (L) 40.0 - 54.0 %    Mean Corpuscular Volume 94 82 - 98 fL    Mean Corpuscular Hemoglobin 32.2 (H) 27.0 - 31.0 pg    Mean Corpuscular Hemoglobin Conc 34.3 32.0 - 36.0 g/dL    RDW 12.3 11.5 - 14.5 %    Platelets 186 150 - 350 K/uL    MPV 9.5 9.2 - 12.9 fL    Immature Granulocytes 0.3 0.0 - 0.5 %    Gran # (ANC) 7.1 1.8 - 7.7 K/uL    Immature Grans (Abs) 0.03 0.00 - 0.04 K/uL    Lymph # 2.3 1.0 - 4.8 K/uL    Mono # 0.8 0.3 - 1.0 K/uL    Eos # 0.3 0.0 - 0.5 K/uL    Baso # 0.02 0.00 - 0.20 K/uL    nRBC 0 0 /100 WBC    Gran% 67.8 38.0 - 73.0 %    Lymph% 21.6 18.0 - 48.0 %    Mono% 7.6 4.0 - 15.0 %    Eosinophil% 2.5 0.0 - 8.0 %    Basophil% 0.2 0.0 - 1.9 %    Differential Method Automated    Comprehensive metabolic panel   Result Value Ref Range    Sodium 130 (L) 136 - 145 mmol/L    Potassium 3.5 3.5 - 5.1 mmol/L    Chloride 91 (L) 95 - 110 mmol/L    CO2 30 (H) 23 - 29 mmol/L    Glucose 120 (H) 70 - 110 mg/dL    BUN, Bld 11 10 - 30 mg/dL    Creatinine 0.7 0.5 - 1.4 mg/dL    Calcium 8.9 8.7 - 10.5 mg/dL    Total Protein 6.4 6.0 - 8.4 g/dL    Albumin 3.5 3.5 - 5.2 g/dL    Total Bilirubin 0.5 0.1 - 1.0 mg/dL    Alkaline Phosphatase 55 55 - 135 U/L    AST 12 10 - 40 U/L    ALT 12 10 - 44 U/L    Anion Gap 9 8 - 16 mmol/L    eGFR if African American >60 >60 mL/min/1.73 m^2    eGFR if non African American >60 >60 mL/min/1.73 m^2         Imaging Results:  Imaging Results          CT Renal Stone Study ABD Pelvis WO (Final result)  Result time 10/12/19 22:03:52     Final result by Hubert Blanco MD (10/12/19 22:03:52)                 Impression:      1. There is no CT evidence of an acute intra-abdominal inflammatory process.  2. There is severe colonic diverticulosis, descending and sigmoid colon, without diverticulitis.  3. Densely calcified gallstone, 18 mm.  Partial right hemicolectomy.  Extensive atherosclerosis.  Dependent left lower lobe lung base 1.5 cm subpleural nodular opacity with associated pleuroparenchymal band.  Recommend follow-up according to the Fleischner society criteria.  For multiple solid nodules with any 6 mm or greater, Fleischner Society guidelines recommend follow up with non-contrast chest CT at 3-6 months and 18-24 months after discovery.    All CT scans at this facility are performed  using dose modulation techniques as appropriate to performed exam including the following:  automated exposure control; adjustment of mA and/or kV according to the patients size (this includes techniques or standardized protocols for targeted exams where dose is matched to indication/reason for exam: i.e. extremities or head);  iterative reconstruction technique.      Electronically signed by: Hubert Blanco MD  Date:    10/12/2019  Time:    22:03             Narrative:    EXAMINATION:  CT RENAL STONE STUDY ABD PELVIS WO    CLINICAL HISTORY:  Abdominal pain, unspecified;    TECHNIQUE:  Abdominal and pelvic CT without contrast.  Coronal and sagittal reformations.    COMPARISON:  02/03/2017    FINDINGS:  Abdominal CT.  1.5 cm subpleural nodular opacity with associated pleuroparenchymal band at the dependent left lower lobe lung base, new since the prior study.  Otherwise, lung bases are clear.    Normal size heart with extensive coronary artery calcification.    Noncontrast evaluation liver, spleen, pancreas, adrenal glands, and kidneys is unremarkable.    Normal caliber atherosclerotic aorta.  No lymphadenopathy.  Densely calcified gallstone, 18 mm.  The gallbladder  is predominantly contracted.  No bile duct dilatation.    No bowel obstruction.  Partial right hemicolectomy with surgical staple line.  There is severe diverticulosis descending and sigmoid colon without diverticulitis.  The GI tract is otherwise unremarkable.    L4-5 and L5-S1 degenerative disc disease.  The bones are osteopenic.    Pelvic CT.  The pelvic ring is intact.    There is a well-positioned Salazar catheter within the collapsed urinary bladder, accounting for the intravesicular air.  Prostate gland is mildly enlarged.    There is no pelvic mass, free fluid, or lymphadenopathy.  There is no additional abnormality of the pelvic bowel loops.  Extensive aortoiliac atherosclerosis.                               X-Ray Chest 1 View (Final result)  Result time 10/12/19 21:50:09    Final result by Hubert Blanco MD (10/12/19 21:50:09)                 Impression:      Stable chest x-ray.      Electronically signed by: Hubert Blanco MD  Date:    10/12/2019  Time:    21:50             Narrative:    EXAMINATION:  XR CHEST 1 VIEW    CLINICAL HISTORY:  Chronic obstructive pulmonary disease, unspecified    FINDINGS:  Comparison study 11/21/2018.  No change.  Status post CABG.  Normal size heart.  Tortuous aorta with mild calcification.  No congestion.  Lungs are clear.  There is heterotopic ossification projecting over the right supraclavicular fossa.                                  The EKG was ordered, reviewed, and independently interpreted by the ED provider.  Interpretation time: 2135  Rate: 80 BPM  Rhythm: sinus rhythm with marked sinus arrhythmia with premature atrial complexes  Interpretation: Low voltage QRS. Cannot r/o anterior infarct. No STEMI.      The Emergency Provider reviewed the vital signs and test results, which are outlined above.     ED Discussion       10:30 PM: Reassessed pt at this time.  Pt states his condition has resolved at this time. Discussed with pt all pertinent ED information and results.  Discussed pt dx and plan of tx. Gave pt all f/u and return to the ED instructions. All questions and concerns were addressed at this time. Pt expresses understanding of information and instructions, and is comfortable with plan to discharge. Pt is stable for discharge.    I discussed with patient and/or family/caretaker that evaluation in the ED does not suggest any emergent or life threatening medical conditions requiring immediate intervention beyond what was provided in the ED, and I believe patient is safe for discharge.  Regardless, an unremarkable evaluation in the ED does not preclude the development or presence of a serious of life threatening condition. As such, patient was instructed to return immediately for any worsening or change in current symptoms.       MDM        Medical Decision Making:   Clinical Tests:   Lab Tests: Ordered and Reviewed  Radiological Study: Ordered and Reviewed  Medical Tests: Reviewed and Ordered           ED Medication(s):  Medications - No data to display    New Prescriptions    No medications on file        Medication List      ASK your doctor about these medications    albuterol 90 mcg/actuation inhaler  Commonly known as:  PROVENTIL/VENTOLIN HFA  Inhale 2 puffs into the lungs every 4 (four) hours as needed. Rescue     albuterol-ipratropium 2.5 mg-0.5 mg/3 mL nebulizer solution  Commonly known as:  DUO-NEB  Take 3 mLs by nebulization every 6 (six) hours while awake. DX: J44.9     carvedilol 25 MG tablet  Commonly known as:  COREG  TAKE ONE TABLET BY MOUTH TWICE DAILY     finasteride 5 mg tablet  Commonly known as:  PROSCAR  Take 1 tablet (5 mg total) by mouth once daily.     fluticasone-salmeterol 250-50 mcg/dose 250-50 mcg/dose diskus inhaler  Commonly known as:  ADVAIR DISKUS  INHALE ONE DOSE BY MOUTH TWICE DAILY     gabapentin 100 MG capsule  Commonly known as:  NEURONTIN  Take 1 capsule (100 mg total) by mouth 3 (three) times daily.     hydroCHLOROthiazide 25 MG  tablet  Commonly known as:  HYDRODIURIL  TAKE 1 TABLET BY MOUTH ONCE DAILY     hyoscyamine 0.125 mg Tab  Commonly known as:  ANASPAZ,LEVSIN     KLOR-CON M20 20 MEQ tablet  Generic drug:  potassium chloride SA  TAKE 1 TABLET BY MOUTH TWICE DAILY     lisinopril 40 MG tablet  Commonly known as:  PRINIVIL,ZESTRIL     mucus clearing device  Commonly known as:  ACAPELLA  1 Device by Misc.(Non-Drug; Combo Route) route every 4 to 6 hours as needed.     nitroGLYCERIN 0.4 MG SL tablet  Commonly known as:  NITROSTAT  Place 1 tablet (0.4 mg total) under the tongue every 5 (five) minutes as needed for Chest pain.     omeprazole 20 MG capsule  Commonly known as:  PRILOSEC  TAKE 1 CAPSULE BY MOUTH ONCE DAILY     roflumilast 500 mcg Tab  Commonly known as:  DALIRESP  Take 1 tablet (500 mcg total) by mouth once daily.     simvastatin 40 MG tablet  Commonly known as:  ZOCOR  TAKE 1 TABLET BY MOUTH IN THE EVENING     SPIRIVA RESPIMAT 2.5 mcg/actuation Mist  Generic drug:  tiotropium bromide  INHALE 2 SPRAY(S) BY MOUTH ONCE DAILY     tamsulosin 0.4 mg Cap  Commonly known as:  FLOMAX     traMADol 50 mg tablet  Commonly known as:  ULTRAM  Take 1 tablet (50 mg total) by mouth every 6 (six) hours as needed for Pain.              Follow-up Information     Sunny Plummer MD. Call in 2 days.    Specialty:  Internal Medicine  Contact information:  [address]  [address]  [facility] [id number] [phone]             Urology. Call in 2 days.                     Scribe Attestation:   Scribe #1: I performed the above scribed service and the documentation accurately describes the services I performed. I attest to the accuracy of the note.     Attending:   Physician Attestation Statement for Scribe #1: I, Pietro Menendez MD, personally performed the services described in this documentation, as scribed by Vadim Londono, in my presence, and it is both accurate and complete.           Clinical Impression       ICD-10-CM ICD-9-CM   1.  Abdominal pain, suprapubic R10.2 789.09   2. COPD (chronic obstructive pulmonary disease) J44.9 496   3. Abdominal pain R10.9 789.00       Disposition:   Disposition: Discharged  Condition: Stable         Pietro Menendez MD  10/12/19 [id number]

## 2019-10-13 NOTE — ED NOTES
Pt was on 3L/min via nasal cannula on home oxygen, transferred pt to wall oxygen at 3L/min. Pt in bed, bed is low and locked, side rails up x2, family member at BS.

## 2019-10-13 NOTE — ED NOTES
DC indicated per MD. DC instructions explained to pt., who verbalized understanding. NAD, VSS, resp. E/u. AAOx4. Escorted via WC (by RN) out of ED without incident. Copy of DC instructions provided to pt. by registration team member with checkout. Pt. To front passenger seat of POV without incident (via WC). Pt's SO driving pt. Away from ED.

## 2019-10-15 NOTE — PROGRESS NOTES
Subjective:       Patient ID: Kelton Mcdaniels is a 89 y.o. male.  Patient Active Problem List   Diagnosis    Impaired fasting glucose    Essential hypertension    Esophageal reflux    History of colonic polyps    Lumbar radiculopathy    Exudative age-related macular degeneration of left eye with active choroidal neovascularization    CAD in native artery    Mixed hyperlipidemia    Pulmonary hypertension    TR (tricuspid regurgitation)    Multifocal atrial tachycardia    COPD, severe    Gross hematuria    Exercise hypoxemia    Hypoxemia requiring supplemental oxygen    Anxiety    History of bladder cancer    History of colon cancer    Entropion of left eyelid    BPH (benign prostatic hyperplasia)    Post herpetic neuralgia    Chronic nasal congestion     Immunization History   Administered Date(s) Administered    Influenza - High Dose - PF (65 years and older) 10/15/2010, 11/15/2011, 09/28/2012, 09/25/2013, 08/22/2014, 09/15/2015, 11/10/2016, 11/14/2017, 11/21/2018, 09/20/2019    Influenza - Trivalent (ADULT) 11/01/2006, 10/24/2007, 11/06/2008, 10/14/2009    Influenza Split 11/01/2006, 10/24/2007, 11/06/2008, 10/14/2009    Pneumococcal Conjugate - 13 Valent 01/20/2016    Pneumococcal Polysaccharide - 23 Valent 03/08/2002     Social History     Tobacco Use   Smoking Status Former Smoker    Packs/day: 2.00    Years: 70.00    Pack years: 140.00    Types: Cigarettes   Smokeless Tobacco Never Used     COPD Questionnaire  How often do you cough?: Most of the time  How often do you have phlegm (mucus) in your chest?: Most of the time  How often does your chest feel tight?: Never  When you walk up a hill or one flight of stairs, how often are you breathless?: All of the time  How often are you limited doing any activities at home?: All of the time  How often are you confident leaving the house despite your lung condition?: All of the time  How often do you sleep soundly?: All of the time  How  "often do you have energy?: Some of the time  Total score: 20   Chief Complaint: COPD    Mr. Kelton Mcdaniels is 89 y.o.   He has severe COPD with an FEV1 of 0.65 (28.1% predicted)  Here with wife: reviewed CXR and Spirometry  Recently got shingles: Tramdol, gabapentin, valtrex  Has chronic nasal congestion worse in AM.  Will get sinus xray.  No fever, no cough  Chronic respiratory failure on Oxygen: Portable tanks, not  Sure about  Exertional dyspnea, occasional  cough nasal congestion  No fever  No reportable recent exacebations needing steriods  MRC grade score is 2, able to move trash to curb side  COPD test score is 20  Medications reviewed stable REGIME: Daliresp, Advair, DuoNeb and Spiriva Respimat    He is on oxygen supplement to the 3.0  L/m  His anxiety is much better controlled no longer use of the Xanax  Using self coping skills  Chest x-ray was reviewed is clear hyperinflation noted         Review of Systems   Constitutional: Positive for fatigue.   HENT: Negative.    Eyes: Negative.    Respiratory: Positive for cough. Negative for snoring, sputum production, shortness of breath, wheezing and use of rescue inhaler.    Cardiovascular: Negative.    Genitourinary: Negative.    Endocrine: endocrine negative   Musculoskeletal: Negative.    Skin: Negative.         Easily bruises   Gastrointestinal: Negative.    Neurological: Negative.    Psychiatric/Behavioral: Negative.        Objective:       Vitals:    10/16/19 0843   BP: 130/76   Pulse: 90   Resp: 18   SpO2: 97%  Comment: 3 liters   Weight: 74.4 kg (164 lb)   Height: 5' 7" (1.702 m)     Physical Exam   Constitutional: He is oriented to person, place, and time. Vital signs are normal. He appears well-nourished. He appears not cachectic. He has a sickly appearance. Nasal cannula in place.       HENT:   Head: Normocephalic.   Nose: Nose normal.   Mouth/Throat: No oropharyngeal exudate.   Neck: Normal range of motion. Neck supple. No tracheal deviation present. No " thyromegaly present.   Cardiovascular: Normal rate, regular rhythm and normal heart sounds.   No murmur heard.  Pulmonary/Chest: Normal expansion and hyperinflation. He has decreased breath sounds. He has no wheezes. He has no rhonchi. Negative for tactile fremitus.   Abdominal: Soft. Bowel sounds are normal.   Lymphadenopathy:     He has no cervical adenopathy.   Neurological: He is alert and oriented to person, place, and time.   Skin: Skin is dry. No cyanosis or erythema. Nails show no clubbing.   Psychiatric: He has a normal mood and affect. His behavior is normal.   Nursing note and vitals reviewed.    Personal Diagnostic Review      Pulmonary function tests: FEV1: 0.65 (28.1 % predicted), FVC:  1.87 (58.7 % predicted), FEV1/FVC:  35    CXR:   CLINICAL HISTORY:  Chronic obstructive pulmonary disease, unspecified    FINDINGS:  Comparison study 11/21/2018.  No change.  Status post CABG.  Normal size heart.  Tortuous aorta with mild calcification.  No congestion.  Lungs are clear.  There is heterotopic ossification projecting over the right supraclavicular fossa.      Impression       Stable chest x-ray.           Assessment:       Problem List Items Addressed This Visit     Pulmonary hypertension    Relevant Orders    WALKER FOR HOME USE    Hypoxemia requiring supplemental oxygen    Relevant Orders    WALKER FOR HOME USE    COPD, severe - Primary (Chronic)     COPD ROS: taking medications as instructed, no medication side effects noted, no significant ongoing wheezing or shortness of breath, using bronchodilator MDI less than twice a week.      Mediations: Proair HFA, Tiotropium bromide, Accapella, Daliresp, Advair Diskus  nebuliser DUONEB PRN  CAT score 5.  FEV1 : 0.68  L( 28.9%), FEV1/FVC 36  mRC score 2     New concerns: None.      Exam: appears well, vitals normal, no respiratory distress, acyanotic, normal RR, chest clear, no wheezing, crepitations, rhonchi, normal symmetric air entry.      Reviewed CXR and  "spirometry: STABLE  Assessment: COPD reasonably well controlled.     Plan: current treatment plan is effective, no change in therapy.         COPD continue RESPIMAT and DALIRESP AND ADVAIR         Relevant Orders    X-Ray Chest PA And Lateral    Spirometry without Bronchodilator    WALKER FOR HOME USE    Exercise hypoxemia (Chronic)     DME:  Oxygen adherent 3.0 LPM         Relevant Orders    WALKER FOR HOME USE    Essential hypertension     STABLE: HCTZ and LISIONOPRIL         Relevant Orders    WALKER FOR HOME USE    Mixed hyperlipidemia     STABLE ZOCOR         Relevant Orders    WALKER FOR HOME USE    Chronic nasal congestion     Sinus Xray  Ref ENT         Relevant Orders    Ambulatory referral to ENT    WALKER FOR HOME USE        Plan:       COPD severe: stable  Weak: needs Rolator walker  Overall COPD stable.  Frustrated about course of his shingles    Follow up in about 6 months (around 4/16/2020), or ref ENT, ordered Walker, for Spirometry and CXR next visit.    This note was prepared using voice recognition system and is likely to have sound alike errors that may have been overlooked even after proof reading.  Please call me with any questions    Discussed diagnosis, its evaluation, treatment and usual course. All questions answered.    Thank you for the courtesy of participating in the care of this patient    Vinicius Cat MD    Orders Placed This Encounter   Procedures    WALKER FOR HOME USE     SEVERE COPD  OXYGEN  WEAKNESS     Order Specific Question:   Type of Walker:     Answer:   Rollator     Order Specific Question:   With wheels?     Answer:   Yes     Order Specific Question:   Height:     Answer:   5' 7" (1.702 m)     Order Specific Question:   Weight:     Answer:   74.4 kg (164 lb)     Order Specific Question:   Length of need (1-99 months):     Answer:   99     Order Specific Question:   Platform attachment:     Answer:   Bilateral     Order Specific Question:   Please check all that " apply:     Answer:   Patient is unable to safely ambulate without equipment.    X-Ray Chest PA And Lateral     Standing Status:   Future     Standing Expiration Date:   10/15/2020    Ambulatory referral to ENT     Referral Priority:   Routine     Referral Type:   Consultation     Referral Reason:   Specialty Services Required     Requested Specialty:   Otolaryngology     Number of Visits Requested:   1    Spirometry without Bronchodilator     Standing Status:   Future     Standing Expiration Date:   10/15/2020

## 2019-10-16 ENCOUNTER — CLINICAL SUPPORT (OUTPATIENT)
Dept: PULMONOLOGY | Facility: CLINIC | Age: 84
End: 2019-10-16
Payer: MEDICARE

## 2019-10-16 ENCOUNTER — HOSPITAL ENCOUNTER (OUTPATIENT)
Dept: RADIOLOGY | Facility: HOSPITAL | Age: 84
Discharge: HOME | End: 2019-10-16
Attending: INTERNAL MEDICINE
Payer: MEDICARE

## 2019-10-16 ENCOUNTER — OFFICE VISIT (OUTPATIENT)
Dept: PULMONOLOGY | Facility: CLINIC | Age: 84
End: 2019-10-16
Payer: MEDICARE

## 2019-10-16 VITALS
HEIGHT: 67 IN | DIASTOLIC BLOOD PRESSURE: 76 MMHG | SYSTOLIC BLOOD PRESSURE: 130 MMHG | WEIGHT: 164 LBS | BODY MASS INDEX: 25.74 KG/M2 | HEART RATE: 90 BPM | RESPIRATION RATE: 18 BRPM | OXYGEN SATURATION: 97 %

## 2019-10-16 DIAGNOSIS — J44.9 COPD, SEVERE: Chronic | ICD-10-CM

## 2019-10-16 DIAGNOSIS — E78.2 MIXED HYPERLIPIDEMIA: ICD-10-CM

## 2019-10-16 DIAGNOSIS — I27.20 PULMONARY HYPERTENSION: ICD-10-CM

## 2019-10-16 DIAGNOSIS — R09.81 CHRONIC NASAL CONGESTION: ICD-10-CM

## 2019-10-16 DIAGNOSIS — J44.9 COPD, SEVERE: ICD-10-CM

## 2019-10-16 DIAGNOSIS — R09.02 EXERCISE HYPOXEMIA: Chronic | ICD-10-CM

## 2019-10-16 DIAGNOSIS — J44.9 COPD, SEVERE: Primary | Chronic | ICD-10-CM

## 2019-10-16 DIAGNOSIS — R09.02 HYPOXEMIA REQUIRING SUPPLEMENTAL OXYGEN: ICD-10-CM

## 2019-10-16 DIAGNOSIS — I10 ESSENTIAL HYPERTENSION: ICD-10-CM

## 2019-10-16 DIAGNOSIS — Z99.81 HYPOXEMIA REQUIRING SUPPLEMENTAL OXYGEN: ICD-10-CM

## 2019-10-16 LAB
BRPFT: ABNORMAL
FEF 25 75 CHG: -10 %
FET100 CHG: 66.7 %
FEV1 CHG: 17.5 %
FEV1 FVC CHG: -14.2 %
FEV1 FVC LLN: 57
FEV1 FVC POST REF: 40.6 %
FEV1 FVC PRE REF: 47.3 %
FEV1 FVC REF: 74
FEV1 LLN: 1.53
FEV1 POST REF: 33.1 %
FEV1 PRE REF: 28.1 %
FEV1 REF: 2.32
FVC CHG: 36.9 %
FVC LLN: 2.22
FVC POST REF: 80.4 %
FVC PRE REF: 58.7 %
FVC REF: 3.18
PEF CHG: 6 %
PEF LLN: 2.9
PEF POST REF: 64.6 %
PEF PRE REF: 60.9 %
PEF REF: 5.02
POST FEF 25 75: 0.23 L/S
POST FET 100: 14.02 SEC
POST FEV1 FVC: 29.97 % (ref 57.48–90.28)
POST FEV1: 0.77 L (ref 1.53–3.11)
POST FVC: 2.56 L (ref 2.22–4.14)
POST PEF: 3.24 L/S (ref 2.9–7.14)
PRE FEF 25 75: 0.25 L/S
PRE FET 100: 8.41 SEC
PRE FEV1 FVC: 34.92 % (ref 57.48–90.28)
PRE FEV1: 0.65 L (ref 1.53–3.11)
PRE FVC: 1.87 L (ref 2.22–4.14)
PRE PEF: 3.06 L/S (ref 2.9–7.14)

## 2019-10-16 PROCEDURE — 99999 PR PBB SHADOW E&M-EST. PATIENT-LVL V: CPT | Mod: PBBFAC,,, | Performed by: INTERNAL MEDICINE

## 2019-10-16 PROCEDURE — 94060 EVALUATION OF WHEEZING: CPT | Mod: PBBFAC

## 2019-10-16 PROCEDURE — 94060 PR EVAL OF BRONCHOSPASM: ICD-10-PCS | Mod: 26,S$PBB,, | Performed by: INTERNAL MEDICINE

## 2019-10-16 PROCEDURE — 99214 PR OFFICE/OUTPT VISIT, EST, LEVL IV, 30-39 MIN: ICD-10-PCS | Mod: 25,S$PBB,, | Performed by: INTERNAL MEDICINE

## 2019-10-16 PROCEDURE — 71046 XR CHEST PA AND LATERAL: ICD-10-PCS | Mod: 26,,, | Performed by: RADIOLOGY

## 2019-10-16 PROCEDURE — 71046 X-RAY EXAM CHEST 2 VIEWS: CPT | Mod: TC

## 2019-10-16 PROCEDURE — 99215 OFFICE O/P EST HI 40 MIN: CPT | Mod: PBBFAC,25 | Performed by: INTERNAL MEDICINE

## 2019-10-16 PROCEDURE — 71046 X-RAY EXAM CHEST 2 VIEWS: CPT | Mod: 26,,, | Performed by: RADIOLOGY

## 2019-10-16 PROCEDURE — 99999 PR PBB SHADOW E&M-EST. PATIENT-LVL V: ICD-10-PCS | Mod: PBBFAC,,, | Performed by: INTERNAL MEDICINE

## 2019-10-16 PROCEDURE — 99214 OFFICE O/P EST MOD 30 MIN: CPT | Mod: 25,S$PBB,, | Performed by: INTERNAL MEDICINE

## 2019-10-16 PROCEDURE — 94060 EVALUATION OF WHEEZING: CPT | Mod: 26,S$PBB,, | Performed by: INTERNAL MEDICINE

## 2019-10-16 NOTE — ASSESSMENT & PLAN NOTE
COPD ROS: taking medications as instructed, no medication side effects noted, no significant ongoing wheezing or shortness of breath, using bronchodilator MDI less than twice a week.      Mediations: Proair HFA, Tiotropium bromide, Accapella, Daliresp, Advair Diskus  nebuliser DUONEB PRN  CAT score 5.  FEV1 : 0.68  L( 28.9%), FEV1/FVC 36  mRC score 2     New concerns: None.      Exam: appears well, vitals normal, no respiratory distress, acyanotic, normal RR, chest clear, no wheezing, crepitations, rhonchi, normal symmetric air entry.      Reviewed CXR and spirometry: STABLE  Assessment: COPD reasonably well controlled.     Plan: current treatment plan is effective, no change in therapy.         COPD continue RESPIMAT and DALIRESP AND ADVAIR

## 2019-10-28 ENCOUNTER — OFFICE VISIT (OUTPATIENT)
Dept: OTOLARYNGOLOGY | Facility: CLINIC | Age: 84
End: 2019-10-28
Payer: MEDICARE

## 2019-10-28 VITALS
WEIGHT: 164.88 LBS | DIASTOLIC BLOOD PRESSURE: 82 MMHG | HEART RATE: 100 BPM | BODY MASS INDEX: 25.83 KG/M2 | TEMPERATURE: 97 F | SYSTOLIC BLOOD PRESSURE: 151 MMHG

## 2019-10-28 DIAGNOSIS — Z87.891 FORMER VERY HEAVY CIGARETTE SMOKER (MORE THAN 40 PER DAY): ICD-10-CM

## 2019-10-28 DIAGNOSIS — J44.9 COPD, SEVERE: Chronic | ICD-10-CM

## 2019-10-28 DIAGNOSIS — R09.81 NASAL CONGESTION: Primary | ICD-10-CM

## 2019-10-28 PROCEDURE — 99215 OFFICE O/P EST HI 40 MIN: CPT | Mod: PBBFAC | Performed by: ORTHOPAEDIC SURGERY

## 2019-10-28 PROCEDURE — 99999 PR PBB SHADOW E&M-EST. PATIENT-LVL V: CPT | Mod: PBBFAC,,, | Performed by: ORTHOPAEDIC SURGERY

## 2019-10-28 PROCEDURE — 99999 PR PBB SHADOW E&M-EST. PATIENT-LVL V: ICD-10-PCS | Mod: PBBFAC,,, | Performed by: ORTHOPAEDIC SURGERY

## 2019-10-28 PROCEDURE — 99204 PR OFFICE/OUTPT VISIT, NEW, LEVL IV, 45-59 MIN: ICD-10-PCS | Mod: S$PBB,,, | Performed by: ORTHOPAEDIC SURGERY

## 2019-10-28 PROCEDURE — 99204 OFFICE O/P NEW MOD 45 MIN: CPT | Mod: S$PBB,,, | Performed by: ORTHOPAEDIC SURGERY

## 2019-10-28 RX ORDER — AZELASTINE 1 MG/ML
1 SPRAY, METERED NASAL 2 TIMES DAILY
Qty: 30 ML | Refills: 12 | Status: SHIPPED | OUTPATIENT
Start: 2019-10-28 | End: 2020-10-07 | Stop reason: SDUPTHER

## 2019-10-28 NOTE — LETTER
October 28, 2019      Vinicius Cat MD  [address]  [place] [address]           [facility]  [address] [place] [id number]  Phone: [phone]  Fax: [phone]          Patient: Kelton Mcdaniels   MR Number: [id number]   YOB: 1928   Date of Visit: 10/28/2019       Dear Dr. Vinicius Cat:    Thank you for referring Kelton Mcdaniels to me for evaluation. Attached you will find relevant portions of my assessment and plan of care.    If you have questions, please do not hesitate to call me. I look forward to following Kelton Mcdaniels along with you.    Sincerely,    Jaylin Malcolm MD    Enclosure  CC:  No Recipients    If you would like to receive this communication electronically, please contact [email or web address] or [phone] to request more information on [employer] access.    For providers and/or their staff who would like to refer a patient to [facility], please contact us through our one-stop-shop provider referral line, [facility], at [phone].    If you feel you have received this communication in error or would no longer like to receive these types of communications, please e-mail [email or web address]

## 2019-10-28 NOTE — PROGRESS NOTES
Subjective:       Patient ID: Kelton Mcdaniels is a 89 y.o. male.    Chief Complaint: No chief complaint on file.    HPI  Review of Systems   Constitutional: Negative for fatigue, fever and unexpected weight change.   HENT: Negative for congestion, ear discharge, ear pain, facial swelling, hearing loss, nosebleeds, postnasal drip, rhinorrhea, sinus pressure, sneezing, sore throat, tinnitus, trouble swallowing and voice change.    Eyes: Negative for discharge, redness and itching.   Respiratory: Negative for cough, choking, shortness of breath and wheezing.    Cardiovascular: Negative for chest pain and palpitations.   Gastrointestinal: Negative for abdominal pain.        No reflux.   Musculoskeletal: Negative for neck pain.   Neurological: Negative for dizziness, facial asymmetry, light-headedness and headaches.   Hematological: Negative for adenopathy. Does not bruise/bleed easily.   Psychiatric/Behavioral: Negative for agitation, behavioral problems, confusion and decreased concentration.       Objective:      Physical Exam  s  Assessment:       No diagnosis found.    Plan:       ***

## 2019-10-28 NOTE — PROGRESS NOTES
Subjective:       Patient ID: Kelton Mcdaniels is a 89 y.o. male.    Chief Complaint: Sinus Problem (nasal congestion)    Patient is a very pleasant 89 year old gentleman here to see me today for evaluation of nasal congestion and obstruction.  He has an extensive smoking history, formerly smoked 2 packs daily x 70 years.  He does have a diagnosis of COPD and follows with pulmonary.  He is on oxygen both during the day and at night, and he finds that he is waking up with nasal congestion.  He is wearing the nasal cannula at night, not a mask, it is humidified.  He has tried nasal steroid sprays in the past, he has tried saline as well in the past.  He is on Mucinex daily.    Review of Systems   Constitutional: Negative for fatigue, fever and unexpected weight change.   HENT: Positive for congestion, hearing loss, postnasal drip and rhinorrhea. Negative for ear discharge, ear pain, facial swelling, nosebleeds, sinus pressure, sneezing, sore throat, tinnitus, trouble swallowing and voice change.    Eyes: Positive for visual disturbance. Negative for discharge, redness and itching.   Respiratory: Positive for cough and shortness of breath. Negative for choking and wheezing.    Cardiovascular: Negative for chest pain and palpitations.   Gastrointestinal: Negative for abdominal pain.        No reflux.   Musculoskeletal: Negative for neck pain.   Neurological: Negative for dizziness, facial asymmetry, light-headedness and headaches.   Hematological: Negative for adenopathy. Does not bruise/bleed easily.   Psychiatric/Behavioral: Negative for agitation, behavioral problems, confusion and decreased concentration.       Objective:      Physical Exam   Constitutional: He is oriented to person, place, and time. Vital signs are normal. He appears well-developed and well-nourished. No distress.   HENT:   Head: Normocephalic and atraumatic.   Right Ear: Hearing, tympanic membrane, external ear and ear canal normal.   Left Ear:  Hearing, tympanic membrane, external ear and ear canal normal.   Nose: Nose normal. No mucosal edema, rhinorrhea, nasal deformity or septal deviation.   Mouth/Throat: Uvula is midline, oropharynx is clear and moist and mucous membranes are normal. No trismus in the jaw. Normal dentition. No uvula swelling. No oropharyngeal exudate or posterior oropharyngeal edema.   Eyes: Pupils are equal, round, and reactive to light. Conjunctivae and EOM are normal. Right eye exhibits no chemosis. Left eye exhibits no chemosis. Right conjunctiva is not injected. Left conjunctiva is not injected. No scleral icterus.   Neck: Trachea normal and phonation normal. No tracheal tenderness present. No tracheal deviation present. No thyroid mass and no thyromegaly present.   Cardiovascular: Intact distal pulses.   Pulmonary/Chest: Effort normal. No accessory muscle usage or stridor. No respiratory distress.   Lymphadenopathy:        Head (right side): No submental, no submandibular, no preauricular and no posterior auricular adenopathy present.        Head (left side): No submental, no submandibular, no preauricular and no posterior auricular adenopathy present.     He has no cervical adenopathy.        Right cervical: No superficial cervical and no deep cervical adenopathy present.       Left cervical: No superficial cervical and no deep cervical adenopathy present.   Neurological: He is alert and oriented to person, place, and time. No cranial nerve deficit.   Skin: Skin is warm and dry. No rash noted. No erythema.   Psychiatric: He has a normal mood and affect. His behavior is normal. Thought content normal.       Assessment:       1. Nasal congestion    2. Former very heavy cigarette smoker (more than 40 per day)    3. COPD, severe        Plan:       1.  Nasal congestion:  He has significant nasal congestion as well as a heavy smoking history.  Unfortunately, sometimes permanent damage has been done to the nasal mucosa (similar to lung  damage) and nasal drainage may not improve even after stopping smoking.  we start with Flonase in the morning, Astelin at night, prescription sent to pharmacy.  I would also recommend saline spray during the day.  He will please report with his progress 2 weeks.  Could consider switching his nasal cannula to a mask at night if he continues to have significant obstruction.  2.  Former smoker:  As above.  3.  COPD with discussed that high-flow ox contracture nasal congestion obstruction.  Will try and manage as above.

## 2019-10-28 NOTE — PATIENT INSTRUCTIONS
1. Flonase in the morning  2.  Saline spray 4-5 times during the day  3. Astelin one hour before bed, can repeat if needed during the night

## 2019-11-06 ENCOUNTER — PROCEDURE VISIT (OUTPATIENT)
Dept: OPHTHALMOLOGY | Facility: CLINIC | Age: 84
End: 2019-11-06
Payer: MEDICARE

## 2019-11-06 DIAGNOSIS — H35.3221 EXUDATIVE AGE-RELATED MACULAR DEGENERATION OF LEFT EYE WITH ACTIVE CHOROIDAL NEOVASCULARIZATION: Primary | ICD-10-CM

## 2019-11-06 PROCEDURE — 92134 CPTRZ OPH DX IMG PST SGM RTA: CPT | Mod: PBBFAC | Performed by: OPHTHALMOLOGY

## 2019-11-06 PROCEDURE — 92134 POSTERIOR SEGMENT OCT RETINA (OCULAR COHERENCE TOMOGRAPHY)-BOTH EYES: ICD-10-PCS | Mod: 26,S$PBB,, | Performed by: OPHTHALMOLOGY

## 2019-11-06 PROCEDURE — 92014 PR EYE EXAM, EST PATIENT,COMPREHESV: ICD-10-PCS | Mod: S$PBB,,, | Performed by: OPHTHALMOLOGY

## 2019-11-06 PROCEDURE — 67028 PR INJECT INTRAVITREAL PHARMCOLOGIC: ICD-10-PCS | Mod: S$PBB,LT,, | Performed by: OPHTHALMOLOGY

## 2019-11-06 PROCEDURE — 67028 INJECTION EYE DRUG: CPT | Mod: PBBFAC,LT | Performed by: OPHTHALMOLOGY

## 2019-11-06 PROCEDURE — 92014 COMPRE OPH EXAM EST PT 1/>: CPT | Mod: S$PBB,,, | Performed by: OPHTHALMOLOGY

## 2019-11-06 PROCEDURE — 67028 INJECTION EYE DRUG: CPT | Mod: S$PBB,LT,, | Performed by: OPHTHALMOLOGY

## 2019-11-06 RX ADMIN — AFLIBERCEPT 2 MG: 40 INJECTION, SOLUTION INTRAVITREAL at 03:11

## 2019-11-06 NOTE — PROGRESS NOTES
HPI     srn      Additional comments: eylea os              Comments     Vigamox Prep/NO BETADINE    --Smd/srn od dx. 10/5/15   History of avastin and eylea od, last 1/20/17  --2+ vac cat ou  New Srn os dx and treated with avastin 3/15/19 -- 4/17/19  Eylea OS last10/3/19          Last edited by VALERIA White on 11/6/2019  2:31 PM. (History)      OCT - OD GA - no SRF  OS - SRF resolved, paracentral GA       Wet AMD OS    Stabilized - ok for extensions    Eylea OS today      5-6 weeks OCT    Risks, benefits, and alternatives to treatment discussed in detail with the patient.  The patient voiced understanding and wished to proceed with the procedure    Injection Procedure Note:  Diagnosis: Wet AMD OS    Patient Identified and Time Out complete  Pt Prefers to be marked with sticker rather than ink marker ([email or web address] #[id number])  Topical Proparacaine and Betadine.  Inject Eylea OS at 6:00 @ 3.5-4mm posterior to limbus  Post Operative Dx: Same  Complications: None  Follow up as above.

## 2019-12-27 ENCOUNTER — PROCEDURE VISIT (OUTPATIENT)
Dept: OPHTHALMOLOGY | Facility: CLINIC | Age: 84
End: 2019-12-27
Payer: MEDICARE

## 2019-12-27 DIAGNOSIS — H35.3221 EXUDATIVE AGE-RELATED MACULAR DEGENERATION OF LEFT EYE WITH ACTIVE CHOROIDAL NEOVASCULARIZATION: Primary | ICD-10-CM

## 2019-12-27 PROCEDURE — 67028 PR INJECT INTRAVITREAL PHARMCOLOGIC: ICD-10-PCS | Mod: S$PBB,LT,, | Performed by: OPHTHALMOLOGY

## 2019-12-27 PROCEDURE — 92134 POSTERIOR SEGMENT OCT RETINA (OCULAR COHERENCE TOMOGRAPHY)-BOTH EYES: ICD-10-PCS | Mod: 26,S$PBB,, | Performed by: OPHTHALMOLOGY

## 2019-12-27 PROCEDURE — 92134 CPTRZ OPH DX IMG PST SGM RTA: CPT | Mod: PBBFAC | Performed by: OPHTHALMOLOGY

## 2019-12-27 PROCEDURE — 67028 INJECTION EYE DRUG: CPT | Mod: S$PBB,LT,, | Performed by: OPHTHALMOLOGY

## 2019-12-27 PROCEDURE — 99499 NO LOS: ICD-10-PCS | Mod: S$PBB,,, | Performed by: OPHTHALMOLOGY

## 2019-12-27 PROCEDURE — 67028 INJECTION EYE DRUG: CPT | Mod: PBBFAC,LT | Performed by: OPHTHALMOLOGY

## 2019-12-27 PROCEDURE — 99499 UNLISTED E&M SERVICE: CPT | Mod: S$PBB,,, | Performed by: OPHTHALMOLOGY

## 2019-12-27 RX ADMIN — AFLIBERCEPT 2 MG: 40 INJECTION, SOLUTION INTRAVITREAL at 07:12

## 2019-12-27 NOTE — PROGRESS NOTES
===============================  Kelton Mcdaniels,   89 y.o. male   Last visit [facility]: :10/3/2019   Last visit eye dept. 11/6/2019  [facility]:  Corrected distance visual acuity was 20/400 in the right eye and 20/80 in the left eye.   Not recorded         Not recorded         Not recorded         Not recorded        Chief Complaint   Patient presents with    srn     eylea os          ________________  12/27/2019  HPI     srn      Additional comments: eylea os              Comments     Vigamox Prep/NO BETADINE    --Smd/srn od dx. 10/5/15   History of avastin and eylea od, last 1/20/17  --2+ vac cat ou  New Srn os dx and treated with avastin 3/15/19   Last Eylea OS 10/3/19          Last edited by VALERIA White on 12/27/2019  7:11 AM. 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           Relevant Medications    aflibercept Soln 2 mg (Completed)    Other Relevant Orders    Prior Authorization Order    Posterior Segment OCT Retina-Both eyes (Completed)        txx  rtc 6 weeks    Oct os good od glitic      12/27/2019  Diagnosis :  Os srn  Today:   Eylea (afibercept) 2 mg/0.05 ml Intravitreal Injection , OS   Follow up: rtc  6 weeks        Instructed to call 24/7 for any worsening of vision. Check Both eyes daily. Gave patient my home phone number.  Risks, benefits, and alternatives to treatment discussed in detail with the patient.  The patient voiced understanding and wished to proceed with the procedure     Injection Procedure Note:       Patient Identified and Time Out complete  Subconjunctival bleb - xylocaine with epi 2%   and Betadine.  Inject eylea os at 6:00 @ 3.5-4mm posterior to limbus  Post Operative Dx: Same  Complications: None  Follow up as above.  .       ===========================

## 2020-02-04 DIAGNOSIS — J44.9 COPD, SEVERE: Chronic | ICD-10-CM

## 2020-02-04 RX ORDER — FLUTICASONE PROPIONATE AND SALMETEROL 50; 250 UG/1; UG/1
POWDER RESPIRATORY (INHALATION)
Qty: 60 EACH | Refills: 11 | Status: SHIPPED | OUTPATIENT
Start: 2020-02-04

## 2020-02-05 ENCOUNTER — PROCEDURE VISIT (OUTPATIENT)
Dept: OPHTHALMOLOGY | Facility: CLINIC | Age: 85
End: 2020-02-05
Payer: MEDICARE

## 2020-02-05 DIAGNOSIS — H35.3221 EXUDATIVE AGE-RELATED MACULAR DEGENERATION OF LEFT EYE WITH ACTIVE CHOROIDAL NEOVASCULARIZATION: Primary | ICD-10-CM

## 2020-02-05 PROCEDURE — 92134 POSTERIOR SEGMENT OCT RETINA (OCULAR COHERENCE TOMOGRAPHY)-BOTH EYES: ICD-10-PCS | Mod: 26,S$PBB,, | Performed by: OPHTHALMOLOGY

## 2020-02-05 PROCEDURE — 92012 INTRM OPH EXAM EST PATIENT: CPT | Mod: 25,S$PBB,, | Performed by: OPHTHALMOLOGY

## 2020-02-05 PROCEDURE — 92012 PR EYE EXAM, EST PATIENT,INTERMED: ICD-10-PCS | Mod: 25,S$PBB,, | Performed by: OPHTHALMOLOGY

## 2020-02-05 PROCEDURE — 67028 INJECTION EYE DRUG: CPT | Mod: S$PBB,LT,, | Performed by: OPHTHALMOLOGY

## 2020-02-05 PROCEDURE — 67028 INJECTION EYE DRUG: CPT | Mod: PBBFAC,LT | Performed by: OPHTHALMOLOGY

## 2020-02-05 PROCEDURE — 92134 CPTRZ OPH DX IMG PST SGM RTA: CPT | Mod: PBBFAC | Performed by: OPHTHALMOLOGY

## 2020-02-05 PROCEDURE — 67028 PR INJECT INTRAVITREAL PHARMCOLOGIC: ICD-10-PCS | Mod: S$PBB,LT,, | Performed by: OPHTHALMOLOGY

## 2020-02-05 RX ADMIN — AFLIBERCEPT 2 MG: 40 INJECTION, SOLUTION INTRAVITREAL at 09:02

## 2020-02-05 NOTE — PROGRESS NOTES
===============================  Kelton Mcdaniels,  2/5/2020 today   89 y.o. male   Last visit [facility]: :12/27/2019   Last visit eye dept. 12/27/2019  [facility]:  Corrected distance visual acuity was 20/400 in the right eye and 20/60 in the left eye.   Not recorded         Not recorded         Not recorded         Not recorded        Chief Complaint   Patient presents with    Macular Degeneration     eylea os/ likes the way dr. bragg did the injection       ________________  2/5/2020 today  HPI     Macular Degeneration      Additional comments: eylea os/ likes the way dr. bragg did the   injection              Comments     Vigamox Prep/NO BETADINE    --Smd/srn od dx. 10/5/15   History of avastin and eylea od, last 1/20/17  --2+ vac cat ou  New Srn os dx and treated with avastin 3/15/19   Last Eylea OS 12/27/19          Last edited by Megan Leong on 2/5/2020  9:19 AM. 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           Relevant Medications    aflibercept Soln 2 mg (Start on 2/5/2020  9:45 AM)    Other Relevant Orders    Posterior Segment OCT Retina-Both eyes (Completed)    Prior Authorization Order        Os srn txx   [facility]   [facility] shake[facility]    .  2/5/2020  Diagnosis :  srn os  Today:   Eylea (afibercept) 2 mg/0.05 ml Intravitreal Injection , OS   Follow up: 8 weks, no bleb        Instructed to call 24/7 for any worsening of vision. Check Both eyes daily. Gave patient my home phone number.  Risks, benefits, and alternatives to treatment discussed in detail with the patient.  The patient voiced understanding and wished to proceed with the procedure     Injection Procedure Note:       Patient Identified and Time Out complete  Tetracaine topical drops and Betadine.  Inject eylea os at  6:00 @ 3.5-4mm posterior to limbus  Post Operative Dx: Same  Complications: None  Follow up as above.        ===========================

## 2020-03-06 ENCOUNTER — PATIENT OUTREACH (OUTPATIENT)
Dept: ADMINISTRATIVE | Facility: OTHER | Age: 85
End: 2020-03-06

## 2020-03-09 ENCOUNTER — OFFICE VISIT (OUTPATIENT)
Dept: UROLOGY | Facility: CLINIC | Age: 85
End: 2020-03-09
Payer: MEDICARE

## 2020-03-09 VITALS
TEMPERATURE: 98 F | WEIGHT: 161.19 LBS | BODY MASS INDEX: 25.3 KG/M2 | SYSTOLIC BLOOD PRESSURE: 138 MMHG | DIASTOLIC BLOOD PRESSURE: 72 MMHG | HEART RATE: 96 BPM | HEIGHT: 67 IN

## 2020-03-09 DIAGNOSIS — I10 HYPERTENSION, UNSPECIFIED TYPE: ICD-10-CM

## 2020-03-09 DIAGNOSIS — K59.00 CONSTIPATION, UNSPECIFIED CONSTIPATION TYPE: ICD-10-CM

## 2020-03-09 DIAGNOSIS — N40.0 BENIGN PROSTATIC HYPERPLASIA, UNSPECIFIED WHETHER LOWER URINARY TRACT SYMPTOMS PRESENT: Primary | ICD-10-CM

## 2020-03-09 DIAGNOSIS — Z12.5 PROSTATE CANCER SCREENING: ICD-10-CM

## 2020-03-09 LAB
BILIRUB SERPL-MCNC: NORMAL MG/DL
BLOOD URINE, POC: NORMAL
COLOR, POC UA: YELLOW
GLUCOSE UR QL STRIP: NORMAL
KETONES UR QL STRIP: NORMAL
LEUKOCYTE ESTERASE URINE, POC: NORMAL
NITRITE, POC UA: NORMAL
PH, POC UA: 7
POC RESIDUAL URINE VOLUME: 0 ML (ref 0–100)
PROTEIN, POC: NORMAL
SPECIFIC GRAVITY, POC UA: 1
UROBILINOGEN, POC UA: NORMAL

## 2020-03-09 PROCEDURE — 99999 PR PBB SHADOW E&M-EST. PATIENT-LVL III: CPT | Mod: PBBFAC,,, | Performed by: UROLOGY

## 2020-03-09 PROCEDURE — 99214 PR OFFICE/OUTPT VISIT, EST, LEVL IV, 30-39 MIN: ICD-10-PCS | Mod: S$PBB,,, | Performed by: UROLOGY

## 2020-03-09 PROCEDURE — 99213 OFFICE O/P EST LOW 20 MIN: CPT | Mod: PBBFAC | Performed by: UROLOGY

## 2020-03-09 PROCEDURE — 51798 US URINE CAPACITY MEASURE: CPT | Mod: PBBFAC | Performed by: UROLOGY

## 2020-03-09 PROCEDURE — 81002 URINALYSIS NONAUTO W/O SCOPE: CPT | Mod: PBBFAC | Performed by: UROLOGY

## 2020-03-09 PROCEDURE — 99214 OFFICE O/P EST MOD 30 MIN: CPT | Mod: S$PBB,,, | Performed by: UROLOGY

## 2020-03-09 PROCEDURE — 99999 PR PBB SHADOW E&M-EST. PATIENT-LVL III: ICD-10-PCS | Mod: PBBFAC,,, | Performed by: UROLOGY

## 2020-03-09 RX ORDER — TAMSULOSIN HYDROCHLORIDE 0.4 MG/1
0.4 CAPSULE ORAL DAILY
Qty: 30 CAPSULE | Refills: 11 | Status: SHIPPED | OUTPATIENT
Start: 2020-03-09 | End: 2020-06-09 | Stop reason: SDUPTHER

## 2020-03-09 RX ORDER — FINASTERIDE 5 MG/1
5 TABLET, FILM COATED ORAL DAILY
Qty: 90 TABLET | Refills: 3 | Status: SHIPPED | OUTPATIENT
Start: 2020-03-09 | End: 2020-04-27 | Stop reason: SDUPTHER

## 2020-03-09 NOTE — PROGRESS NOTES
Chief Complaint: Urinary retention    HPI:   3/9/20: Voiding well.  Constipation controlled with miralax.  Reviewed history in detail. Needs O2 and walker all the time now.  Leaks with a cough.  Not bothered at all.  3/4/19: No hematuria and voiding fine.  Constipation still controlled with miralax.  Reviewed history in detail.  Voiding better than a year ago.  3/14/18: Finds that he voids fine when he manages his constipation and all is well feels fine.  Taking flomax/finasteride.  Reviewed history in detail.  3/7/17: Went into retention last weekend and is here with a myles.  No sig clots.  No new meds. -UCx.   ml.  2/9/17: Other than a simple left renal cyst no findings on CT Urogram. Cysto normal except BPH no recurrence of bladder cancer.  1/31/17: 89 yo man last week had a day of dark blood with clots last week.  Was dx with bladder cancer 15+ years ago.  Some surveillance cystoscopy but none in the last 10 years. No abd/pelvic pain and no exac/rel factors.  No urolithiasis.  No urinary bother.  Had 6 wks BCG with a restaging resection after; maybe more BCG after that.  Was told to worry about it no more.  Also had a TURP in that same time frame.  And some gross hematuria attributed to his prostate in 2005.    Allergies:  Betadine [povidone-iodine]    Medications:  has a current medication list which includes the following prescription(s): advair diskus, albuterol, albuterol-ipratropium, azelastine, carvedilol, finasteride, gabapentin, hydrochlorothiazide, hyoscyamine, klor-con m20, lisinopril, mucus clearing device, nitroglycerin, omeprazole, roflumilast, simvastatin, spiriva respimat, tamsulosin, and tramadol.    Review of Systems:  General: No fever, chills, fatigability, or weight loss.  Skin: No rashes, itching, or changes in color or texture of skin.  Chest: Denies CARBAJAL, cyanosis, wheezing, cough, and sputum production.  Abdomen: Appetite fine. No weight loss. Denies diarrhea, abdominal pain,  hematemesis, or blood in stool.  Musculoskeletal: No joint stiffness or swelling. Denies back pain.  : As above.  All other review of systems negative.    PMH:   has a past medical history of BPH (benign prostatic hyperplasia), Cataract, Chronic airway obstruction, not elsewhere classified, Coronary atherosclerosis of unspecified type of vessel, native or graft, Esophageal reflux, Essential hypertension, History of bladder cancer, History of colon cancer, Hyperlipidemia LDL goal <70, Impaired fasting glucose, Macular degeneration, Post herpetic neuralgia, Pulmonary hypertension (12/18/2015), and TR (tricuspid regurgitation) (12/18/2015).    PSH:   has a past surgical history that includes partial coloectomy for large polyp; Coronary artery bypass graft; Colon surgery; Back surgery; and turbt.    FamHx: family history includes Asthma in his paternal uncle; Cancer in his brother; Diabetes in his father; Heart failure in his mother.    SocHx:  reports that he has quit smoking. His smoking use included cigarettes. He has a 140.00 pack-year smoking history. He has never used smokeless tobacco. He reports that he does not drink alcohol or use drugs.      Physical Exam:  Vitals:    03/09/20 1112   BP: 138/72   Pulse: 96   Temp: 98 °F (36.7 °C)     General: A&Ox3, no apparent distress, no deformities  Neck: No masses, normal thyroid  Lungs: normal inspiration, no use of accessory muscles  Heart: normal pulse, no arrhythmias  Abdomen: Soft, NT, ND  Skin: The skin is warm and dry. No jaundice.  Ext: No c/c/e.  :   3/9/20: test desc danyel, CARLEY normal    Labs:  Urinalysis performed in clinic, summary: UA normal  Bladder Scan performed in office:     3/18: PVR 13 ml.    3/20: PVR 0 ml    Impression/Plan:   1. Doing well, no hematuria in interval.    2. BPH: Refill flomax/finasteride.   3. Low risk of sig prostate cancer. RTC 1 year.  4. HTN: mild elev, discussed; taking his meds  5. Constipation: miralax

## 2020-03-13 ENCOUNTER — LAB VISIT (OUTPATIENT)
Dept: LAB | Facility: HOSPITAL | Age: 85
End: 2020-03-13
Attending: INTERNAL MEDICINE
Payer: MEDICARE

## 2020-03-13 DIAGNOSIS — R73.01 IMPAIRED FASTING GLUCOSE: ICD-10-CM

## 2020-03-13 DIAGNOSIS — I10 ESSENTIAL HYPERTENSION: ICD-10-CM

## 2020-03-13 LAB
ALBUMIN SERPL BCP-MCNC: 3.2 G/DL (ref 3.5–5.2)
ALP SERPL-CCNC: 72 U/L (ref 55–135)
ALT SERPL W/O P-5'-P-CCNC: 16 U/L (ref 10–44)
ANION GAP SERPL CALC-SCNC: 8 MMOL/L (ref 8–16)
AST SERPL-CCNC: 15 U/L (ref 10–40)
BASOPHILS # BLD AUTO: 0.03 K/UL (ref 0–0.2)
BASOPHILS NFR BLD: 0.3 % (ref 0–1.9)
BILIRUB SERPL-MCNC: 0.4 MG/DL (ref 0.1–1)
BUN SERPL-MCNC: 9 MG/DL (ref 10–30)
CALCIUM SERPL-MCNC: 9.1 MG/DL (ref 8.7–10.5)
CHLORIDE SERPL-SCNC: 94 MMOL/L (ref 95–110)
CHOLEST SERPL-MCNC: 142 MG/DL (ref 120–199)
CHOLEST/HDLC SERPL: 3.7 {RATIO} (ref 2–5)
CO2 SERPL-SCNC: 35 MMOL/L (ref 23–29)
CREAT SERPL-MCNC: 0.7 MG/DL (ref 0.5–1.4)
DIFFERENTIAL METHOD: ABNORMAL
EOSINOPHIL # BLD AUTO: 0.2 K/UL (ref 0–0.5)
EOSINOPHIL NFR BLD: 2.2 % (ref 0–8)
ERYTHROCYTE [DISTWIDTH] IN BLOOD BY AUTOMATED COUNT: 12.5 % (ref 11.5–14.5)
EST. GFR  (AFRICAN AMERICAN): >60 ML/MIN/1.73 M^2
EST. GFR  (NON AFRICAN AMERICAN): >60 ML/MIN/1.73 M^2
GLUCOSE SERPL-MCNC: 117 MG/DL (ref 70–110)
HCT VFR BLD AUTO: 42.3 % (ref 40–54)
HDLC SERPL-MCNC: 38 MG/DL (ref 40–75)
HDLC SERPL: 26.8 % (ref 20–50)
HGB BLD-MCNC: 13.1 G/DL (ref 14–18)
IMM GRANULOCYTES # BLD AUTO: 0.05 K/UL (ref 0–0.04)
IMM GRANULOCYTES NFR BLD AUTO: 0.5 % (ref 0–0.5)
LDLC SERPL CALC-MCNC: 85.8 MG/DL (ref 63–159)
LYMPHOCYTES # BLD AUTO: 1.8 K/UL (ref 1–4.8)
LYMPHOCYTES NFR BLD: 16.5 % (ref 18–48)
MCH RBC QN AUTO: 30.6 PG (ref 27–31)
MCHC RBC AUTO-ENTMCNC: 31 G/DL (ref 32–36)
MCV RBC AUTO: 99 FL (ref 82–98)
MONOCYTES # BLD AUTO: 0.9 K/UL (ref 0.3–1)
MONOCYTES NFR BLD: 7.8 % (ref 4–15)
NEUTROPHILS # BLD AUTO: 7.9 K/UL (ref 1.8–7.7)
NEUTROPHILS NFR BLD: 72.7 % (ref 38–73)
NONHDLC SERPL-MCNC: 104 MG/DL
NRBC BLD-RTO: 0 /100 WBC
PLATELET # BLD AUTO: 262 K/UL (ref 150–350)
PMV BLD AUTO: 10 FL (ref 9.2–12.9)
POTASSIUM SERPL-SCNC: 4.2 MMOL/L (ref 3.5–5.1)
PROT SERPL-MCNC: 7.1 G/DL (ref 6–8.4)
RBC # BLD AUTO: 4.28 M/UL (ref 4.6–6.2)
SODIUM SERPL-SCNC: 137 MMOL/L (ref 136–145)
TRIGL SERPL-MCNC: 91 MG/DL (ref 30–150)
TSH SERPL DL<=0.005 MIU/L-ACNC: 2.32 UIU/ML (ref 0.4–4)
WBC # BLD AUTO: 10.86 K/UL (ref 3.9–12.7)

## 2020-03-13 PROCEDURE — 36415 COLL VENOUS BLD VENIPUNCTURE: CPT | Mod: PO

## 2020-03-13 PROCEDURE — 80053 COMPREHEN METABOLIC PANEL: CPT

## 2020-03-13 PROCEDURE — 85025 COMPLETE CBC W/AUTO DIFF WBC: CPT

## 2020-03-13 PROCEDURE — 84443 ASSAY THYROID STIM HORMONE: CPT

## 2020-03-13 PROCEDURE — 83036 HEMOGLOBIN GLYCOSYLATED A1C: CPT

## 2020-03-13 PROCEDURE — 80061 LIPID PANEL: CPT

## 2020-03-14 LAB
ESTIMATED AVG GLUCOSE: 126 MG/DL (ref 68–131)
HBA1C MFR BLD HPLC: 6 % (ref 4–5.6)

## 2020-03-17 ENCOUNTER — TELEPHONE (OUTPATIENT)
Dept: INTERNAL MEDICINE | Facility: CLINIC | Age: 85
End: 2020-03-17

## 2020-03-17 NOTE — TELEPHONE ENCOUNTER
----- Message from Meena Kuo sent at 3/17/2020 11:47 AM CDT -----  Contact: Fortino - Son  Type:  Patient Returning Call    Who Called: the pt son  Who Left Message for Patient: unknwon  Does the patient know what this is regarding?: no  Would the patient rather a call back or a response via [facility]? Call back  Best Call Back Number: [phone]  Additional Information: n/a

## 2020-03-17 NOTE — TELEPHONE ENCOUNTER
Patient's son stated that he will have the patient look to see if he need any medications and will give us a call back.

## 2020-03-31 ENCOUNTER — PATIENT MESSAGE (OUTPATIENT)
Dept: OPHTHALMOLOGY | Facility: CLINIC | Age: 85
End: 2020-03-31

## 2020-04-07 RX ORDER — POTASSIUM CHLORIDE 20 MEQ/1
20 TABLET, EXTENDED RELEASE ORAL 2 TIMES DAILY
Qty: 180 TABLET | Refills: 3 | Status: SHIPPED | OUTPATIENT
Start: 2020-04-07

## 2020-04-09 ENCOUNTER — PATIENT MESSAGE (OUTPATIENT)
Dept: INTERNAL MEDICINE | Facility: CLINIC | Age: 85
End: 2020-04-09

## 2020-04-09 ENCOUNTER — OFFICE VISIT (OUTPATIENT)
Dept: INTERNAL MEDICINE | Facility: CLINIC | Age: 85
End: 2020-04-09
Payer: MEDICARE

## 2020-04-09 DIAGNOSIS — K11.20 PAROTIDITIS: Primary | ICD-10-CM

## 2020-04-09 PROCEDURE — 99213 OFFICE O/P EST LOW 20 MIN: CPT | Mod: 95,,, | Performed by: INTERNAL MEDICINE

## 2020-04-09 PROCEDURE — 99211 OFF/OP EST MAY X REQ PHY/QHP: CPT | Mod: PO

## 2020-04-09 PROCEDURE — 99213 PR OFFICE/OUTPT VISIT, EST, LEVL III, 20-29 MIN: ICD-10-PCS | Mod: 95,,, | Performed by: INTERNAL MEDICINE

## 2020-04-09 PROCEDURE — G0463 HOSPITAL OUTPT CLINIC VISIT: HCPCS | Mod: PO

## 2020-04-09 RX ORDER — AMOXICILLIN AND CLAVULANATE POTASSIUM 875; 125 MG/1; MG/1
1 TABLET, FILM COATED ORAL 2 TIMES DAILY
Qty: 20 TABLET | Refills: 0 | Status: SHIPPED | OUTPATIENT
Start: 2020-04-09 | End: 2020-08-03

## 2020-04-09 NOTE — PROGRESS NOTES
The patient location is: home  The chief complaint leading to consultation is: facial swelling  Visit type: Virtual visit with synchronous audio and video  Total time spent with patient: 10 min  Each patient to whom he or she provides medical services by telemedicine is:  (1) informed of the relationship between the physician and patient and the respective role of any other health care provider with respect to management of the patient; and (2) notified that he or she may decline to receive medical services by telemedicine and may withdraw from such care at any time.     HPI:   patient is a 89-year-old man who is seen via telemedicine.  His daughter and grandson are present during the visit.  Patient developed swelling and tenderness of the right side of his phase within the last 24 hr.  He has had no fever.  He denies any trouble swallowing or chewing.  He has no ear pain.  The area of the swelling is all located over the right parotid gland.    Current meds have been verified and updated per the EMR  Exam:  When the daughter palpates the parotid gland  It is quite tender.    When they used the camera to show his face he definitely has significant swelling over the parotid gland.      Lab Results   Component Value Date    WBC 10.86 03/13/2020    HGB 13.1 (L) 03/13/2020    HCT 42.3 03/13/2020     03/13/2020    CHOL 142 03/13/2020    TRIG 91 03/13/2020    HDL 38 (L) 03/13/2020    ALT 16 03/13/2020    AST 15 03/13/2020     03/13/2020    K 4.2 03/13/2020    CL 94 (L) 03/13/2020    CREATININE 0.7 03/13/2020    BUN 9 (L) 03/13/2020    CO2 35 (H) 03/13/2020    TSH 2.322 03/13/2020    PSA 3.8 08/15/2011    INR 1.0 12/08/2015    HGBA1C 6.0 (H) 03/13/2020       Impression:    Parotiditis  Patient Active Problem List   Diagnosis    Impaired fasting glucose    Essential hypertension    Esophageal reflux    History of colonic polyps    Lumbar radiculopathy    Exudative age-related macular degeneration of left  eye with active choroidal neovascularization    CAD in native artery    Mixed hyperlipidemia    Pulmonary hypertension    TR (tricuspid regurgitation)    Multifocal atrial tachycardia    COPD, severe    Gross hematuria    Exercise hypoxemia    Hypoxemia requiring supplemental oxygen    Anxiety    History of bladder cancer    History of colon cancer    Entropion of left eyelid    BPH (benign prostatic hyperplasia)    Post herpetic neuralgia    Chronic nasal congestion       Plan:  Orders Placed This Encounter    amoxicillin-clavulanate 875-125mg (AUGMENTIN) 875-125 mg per tablet      was started on Augmentin.  He was also encouraged to use warm compresses.  Encouraged also to increased salivation by sucking on lemon drops.  They will let me know how he is doing on Monday    This note is generated with speech recognition software and is subject to transcription error and sound alike phrases that may be missed by proofreading.

## 2020-04-25 ENCOUNTER — PATIENT MESSAGE (OUTPATIENT)
Dept: OPHTHALMOLOGY | Facility: CLINIC | Age: 85
End: 2020-04-25

## 2020-04-27 DIAGNOSIS — J44.9 COPD, SEVERE: Chronic | ICD-10-CM

## 2020-04-27 DIAGNOSIS — I10 ESSENTIAL HYPERTENSION: ICD-10-CM

## 2020-04-27 RX ORDER — FINASTERIDE 5 MG/1
5 TABLET, FILM COATED ORAL DAILY
Qty: 90 TABLET | Refills: 3 | Status: CANCELLED | OUTPATIENT
Start: 2020-04-27

## 2020-04-27 RX ORDER — FINASTERIDE 5 MG/1
5 TABLET, FILM COATED ORAL DAILY
Qty: 90 TABLET | Refills: 3 | Status: SHIPPED | OUTPATIENT
Start: 2020-04-27

## 2020-04-27 RX ORDER — HYDROCHLOROTHIAZIDE 25 MG/1
25 TABLET ORAL DAILY
Qty: 90 TABLET | Refills: 3 | Status: SHIPPED | OUTPATIENT
Start: 2020-04-27 | End: 2021-01-08 | Stop reason: SDUPTHER

## 2020-04-27 RX ORDER — TIOTROPIUM BROMIDE INHALATION SPRAY 3.12 UG/1
SPRAY, METERED RESPIRATORY (INHALATION)
Qty: 4 G | Refills: 0 | Status: SHIPPED | OUTPATIENT
Start: 2020-04-27 | End: 2020-04-30 | Stop reason: SDUPTHER

## 2020-04-28 ENCOUNTER — DOCUMENTATION ONLY (OUTPATIENT)
Dept: PULMONOLOGY | Facility: CLINIC | Age: 85
End: 2020-04-28

## 2020-04-28 NOTE — PROGRESS NOTES
Initiated prior authorization request with patient's insurance for Spiriva Respimat 2.5MCG/ACT aerosol prescription. Submitted online via [email or web address] (request key [id number]). After entering all information for prior auth into [employer] form online, prompted to print paper form that must be signed by provider then faxed. Form printed for review by prescribing provider. (Electronically printed and entered into provider fax box.) -- PA case ID None provided.     Most recent pulm clinic note provided with prior auth request.

## 2020-04-30 ENCOUNTER — PROCEDURE VISIT (OUTPATIENT)
Dept: OPHTHALMOLOGY | Facility: CLINIC | Age: 85
End: 2020-04-30
Payer: MEDICARE

## 2020-04-30 DIAGNOSIS — H35.3221 EXUDATIVE AGE-RELATED MACULAR DEGENERATION OF LEFT EYE WITH ACTIVE CHOROIDAL NEOVASCULARIZATION: Primary | ICD-10-CM

## 2020-04-30 DIAGNOSIS — H35.3213 EXUDATIVE AGE-RELATED MACULAR DEGENERATION OF RIGHT EYE WITH INACTIVE SCAR: ICD-10-CM

## 2020-04-30 DIAGNOSIS — H04.129 DRYNESS, EYE: ICD-10-CM

## 2020-04-30 PROCEDURE — 92134 POSTERIOR SEGMENT OCT RETINA (OCULAR COHERENCE TOMOGRAPHY)-BOTH EYES: ICD-10-PCS | Mod: 26,S$PBB,, | Performed by: OPHTHALMOLOGY

## 2020-04-30 PROCEDURE — 99499 NO LOS: ICD-10-PCS | Mod: S$PBB,,, | Performed by: OPHTHALMOLOGY

## 2020-04-30 PROCEDURE — 67028 INJECTION EYE DRUG: CPT | Mod: PBBFAC,LT | Performed by: OPHTHALMOLOGY

## 2020-04-30 PROCEDURE — 67028 INJECTION EYE DRUG: CPT | Mod: S$PBB,LT,, | Performed by: OPHTHALMOLOGY

## 2020-04-30 PROCEDURE — 67028 PR INJECT INTRAVITREAL PHARMCOLOGIC: ICD-10-PCS | Mod: S$PBB,LT,, | Performed by: OPHTHALMOLOGY

## 2020-04-30 PROCEDURE — 92134 CPTRZ OPH DX IMG PST SGM RTA: CPT | Mod: PBBFAC | Performed by: OPHTHALMOLOGY

## 2020-04-30 PROCEDURE — 99499 UNLISTED E&M SERVICE: CPT | Mod: S$PBB,,, | Performed by: OPHTHALMOLOGY

## 2020-04-30 RX ADMIN — AFLIBERCEPT 2 MG: 40 INJECTION, SOLUTION INTRAVITREAL at 10:04

## 2020-04-30 NOTE — PROGRESS NOTES
===============================  Kelton Mcdaniels,  4/30/2020 today   89 y.o. male   Last visit [facility]: :2/5/2020   [place] [place]  [facility]  [place] visual acuity was 20/400 in the right eye and 20/80 in the left eye.  Tonometry     Tonometry (Applanation, 9:15 AM)       Right Left    Pressure 17 17               Not recorded         Not recorded         Not recorded        Chief Complaint   Patient presents with    srn     eylea os       ________________  4/30/2020 today  HPI     srn      Additional comments: eylea os              Comments     --Smd/srn od dx. 10/5/15   History of avastin and eylea od, last 1/20/17  --2+ vac cat ou  New Srn os dx and treated with avastin 3/15/19   Last Eylea OS 2/5/20          Last edited by VALERIA White on 4/30/2020  8:53 AM. 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 New Srn os dx and treated with avastin 3/15/19, then eylea.          Relevant Medications    aflibercept Soln 2 mg (Completed)    Other Relevant Orders    Prior authorization Order    Posterior Segment OCT Retina-Both eyes (Completed)    Exudative age-related macular degeneration of right eye with inactive scar    Relevant Orders    Posterior Segment OCT Retina-Both eyes (Completed)       Other    Dryness, eye          .worse vision  Oct stable (poor view, oct viewed during attempt to capture)  3+ spk  Recommend frequent artificial tears  eylea os today  rtc in 8 weeks  4/30/2020      Instructed to call 24/7 for any worsening of vision. Check Both eyes daily. Gave patient my home phone number.  Risks, benefits, and alternatives to treatment discussed in detail with the patient.  The patient voiced understanding and wished to proceed with the procedure     Injection  Procedure Note:     Patient Identified and Time Out complete  Topical tetracaine and  and dot Betadine on conj only..  Inject eylea os at 6:00 @ 3.5-4mm posterior to limbus  Post Operative Dx: Same  Complications: None  Follow up as above.        ===========================

## 2020-05-07 ENCOUNTER — TELEPHONE (OUTPATIENT)
Dept: PULMONOLOGY | Facility: CLINIC | Age: 85
End: 2020-05-07

## 2020-05-07 NOTE — PROGRESS NOTES
"Approved on 5/6/20 as per letter from payor (see "Media" tab, "Authorizations or Referrals" section). Coverage effective from 5/7/2020 to 5/6/2021.    Faxed letter to patient's pharmacy also.  "

## 2020-05-20 ENCOUNTER — PATIENT MESSAGE (OUTPATIENT)
Dept: OPHTHALMOLOGY | Facility: CLINIC | Age: 85
End: 2020-05-20

## 2020-05-20 RX ORDER — NEOMYCIN SULFATE, POLYMYXIN B SULFATE AND DEXAMETHASONE 3.5; 10000; 1 MG/ML; [USP'U]/ML; MG/ML
1 SUSPENSION/ DROPS OPHTHALMIC 4 TIMES DAILY
Qty: 5 ML | Refills: 0 | Status: SHIPPED | OUTPATIENT
Start: 2020-05-20 | End: 2020-05-27

## 2020-05-21 ENCOUNTER — PATIENT MESSAGE (OUTPATIENT)
Dept: OPHTHALMOLOGY | Facility: CLINIC | Age: 85
End: 2020-05-21

## 2020-05-24 ENCOUNTER — PATIENT OUTREACH (OUTPATIENT)
Dept: ADMINISTRATIVE | Facility: OTHER | Age: 85
End: 2020-05-24

## 2020-05-25 ENCOUNTER — OFFICE VISIT (OUTPATIENT)
Dept: OPHTHALMOLOGY | Facility: CLINIC | Age: 85
End: 2020-05-25
Payer: MEDICARE

## 2020-05-25 DIAGNOSIS — H10.12 ALLERGIC CONJUNCTIVITIS OF LEFT EYE: Primary | ICD-10-CM

## 2020-05-25 PROCEDURE — 99999 PR PBB SHADOW E&M-EST. PATIENT-LVL II: CPT | Mod: PBBFAC,,, | Performed by: OPHTHALMOLOGY

## 2020-05-25 PROCEDURE — 99213 PR OFFICE/OUTPT VISIT, EST, LEVL III, 20-29 MIN: ICD-10-PCS | Mod: S$PBB,,, | Performed by: OPHTHALMOLOGY

## 2020-05-25 PROCEDURE — 99212 OFFICE O/P EST SF 10 MIN: CPT | Mod: PBBFAC | Performed by: OPHTHALMOLOGY

## 2020-05-25 PROCEDURE — 99213 OFFICE O/P EST LOW 20 MIN: CPT | Mod: S$PBB,,, | Performed by: OPHTHALMOLOGY

## 2020-05-25 PROCEDURE — 99999 PR PBB SHADOW E&M-EST. PATIENT-LVL II: ICD-10-PCS | Mod: PBBFAC,,, | Performed by: OPHTHALMOLOGY

## 2020-05-25 RX ORDER — PREDNISOLONE ACETATE 10 MG/ML
1 SUSPENSION/ DROPS OPHTHALMIC 4 TIMES DAILY
Qty: 5 ML | Refills: 0 | Status: SHIPPED | OUTPATIENT
Start: 2020-05-25 | End: 2020-06-04

## 2020-05-25 NOTE — PROGRESS NOTES
===============================  Kelton Mcdaniels,  5/25/2020 today   89 y.o. male   Last visit [facility]: :4/30/2020   Last visit [facility] dept[facility] 4/[place]  [facility]:  Corrected distance visual acuity was not recorded in the right eye and 20/80 in the left eye.  Tonometry     Tonometry       Right Left    Pressure  17               Not recorded         Not recorded         Not recorded        Chief Complaint   Patient presents with    burning pain     pt states that his os has been burning x 1 week, called last week and maxitrol was prescribed but it has not helped     Ophthalmic Medications     Ophthalmic - Anti-inflammatory, Glucocorticoids Start End     prednisoLONE acetate (PRED FORTE) 1 % DrpS    5/25/2020 6/4/2020    Sig: Place 1 drop into the left eye 4 (four) times daily. for 10 days    Route: Left Eye        ________________  5/25/2020 today  HPI     burning pain      Additional comments: pt states that his os has been burning x 1 week,   called last week and maxitrol was prescribed but it has not helped              Comments     --Smd/srn od dx. 10/5/15   History of avastin and eylea od, last 1/20/17  --2+ vac cat ou  New Srn os dx and treated with avastin 3/15/19   Last Eylea OS 4/30/20          Last edited by VALERIA White on 5/25/2020 11:14 AM. (History)      Problem List Items Addressed This Visit     None      Visit Diagnoses     Allergic conjunctivitis of left eye    -  Primary    Relevant Medications    prednisoLONE acetate (PRED FORTE) 1 % DrpS          .allergic  Dc maxitrol  Begin pred forte tid 10 days  rtc 2-3 weeks for eylea os      ===========================

## 2020-05-25 NOTE — PROGRESS NOTES
Chart reviewed.   Immunizations: Triggered Imm Registry     Orders placed: n/a  Upcoming appts to satisfy JUAN FRANCISCO topics: n/a

## 2020-06-09 RX ORDER — TAMSULOSIN HYDROCHLORIDE 0.4 MG/1
0.4 CAPSULE ORAL DAILY
Qty: 30 CAPSULE | Refills: 11 | Status: CANCELLED | OUTPATIENT
Start: 2020-06-09

## 2020-06-10 RX ORDER — TAMSULOSIN HYDROCHLORIDE 0.4 MG/1
0.4 CAPSULE ORAL DAILY
Qty: 30 CAPSULE | Refills: 11 | Status: SHIPPED | OUTPATIENT
Start: 2020-06-10

## 2020-06-22 ENCOUNTER — PROCEDURE VISIT (OUTPATIENT)
Dept: OPHTHALMOLOGY | Facility: CLINIC | Age: 85
End: 2020-06-22
Payer: MEDICARE

## 2020-06-22 ENCOUNTER — PATIENT OUTREACH (OUTPATIENT)
Dept: ADMINISTRATIVE | Facility: OTHER | Age: 85
End: 2020-06-22

## 2020-06-22 DIAGNOSIS — H25.13 AGE-RELATED NUCLEAR CATARACT OF BOTH EYES: ICD-10-CM

## 2020-06-22 DIAGNOSIS — H04.129 DRYNESS, EYE: ICD-10-CM

## 2020-06-22 DIAGNOSIS — H35.3221 EXUDATIVE AGE-RELATED MACULAR DEGENERATION OF LEFT EYE WITH ACTIVE CHOROIDAL NEOVASCULARIZATION: Primary | ICD-10-CM

## 2020-06-22 DIAGNOSIS — H35.3213 EXUDATIVE AGE-RELATED MACULAR DEGENERATION OF RIGHT EYE WITH INACTIVE SCAR: ICD-10-CM

## 2020-06-22 PROCEDURE — 92134 CPTRZ OPH DX IMG PST SGM RTA: CPT | Mod: PBBFAC | Performed by: OPHTHALMOLOGY

## 2020-06-22 PROCEDURE — 67028 INJECTION EYE DRUG: CPT | Mod: PBBFAC,LT | Performed by: OPHTHALMOLOGY

## 2020-06-22 PROCEDURE — 92134 POSTERIOR SEGMENT OCT RETINA (OCULAR COHERENCE TOMOGRAPHY)-BOTH EYES: ICD-10-PCS | Mod: 26,S$PBB,, | Performed by: OPHTHALMOLOGY

## 2020-06-22 PROCEDURE — 67028 PR INJECT INTRAVITREAL PHARMCOLOGIC: ICD-10-PCS | Mod: S$PBB,LT,, | Performed by: OPHTHALMOLOGY

## 2020-06-22 PROCEDURE — 67028 INJECTION EYE DRUG: CPT | Mod: S$PBB,LT,, | Performed by: OPHTHALMOLOGY

## 2020-06-22 RX ADMIN — AFLIBERCEPT 2 MG: 40 INJECTION, SOLUTION INTRAVITREAL at 03:06

## 2020-06-22 NOTE — PROGRESS NOTES
===============================  Kelton Mcdaniels,  6/22/2020 today   89 y.o. male   Last visit [facility]: :5/25/2020   [place] [place]  [facility]  [place] visual acuity was 20/400 in the right eye and 20/80 in the left eye.  Tonometry     Tonometry (Applanation, 3:06 PM)       Right Left    Pressure 17 17               Not recorded         Not recorded         Not recorded        Chief Complaint   Patient presents with    Blurred Vision     I can't see well, especially to read and at night.    Macular Degeneration     eylea os       ________________  6/22/2020 today  HPI     Blurred Vision      Additional comments: I can't see well, especially to read and at night.              Macular Degeneration      Additional comments: eylea os              Comments     --Smd/srn od dx. 10/5/15   History of avastin and eylea od, last 1/20/17  --2+ vac cat ou  New Srn os dx and treated with avastin 3/15/19   Last Eylea OS 4/30/20          Last edited by CRESCENCIO Phillips MD on 6/22/2020  3:07 PM. 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 New Srn os dx and treated with avastin 3/15/19, then eylea.          Relevant Medications    aflibercept Soln 2 mg (Start on 6/22/2020  3:15 PM)    Other Relevant Orders    Posterior Segment OCT Retina-Both eyes    Exudative age-related macular degeneration of right eye with inactive scar       Other    Dryness, eye      Other Visit Diagnoses     Age-related nuclear cataract of both eyes            Dry eye, 3+, recommend at's q2 h  Cataracts, significant, consult Dr. Suraj BAEZ, stable for a year  Maintain at 8 weeks only eye  .  6/22/2020  Diagnosis :  srn os  Today:   Eylea (afibercept) 2 mg/0.05 ml Intravitreal Injection , OS   Follow up: 8 weeks with me  Next  available with Dr. Ruelas    Instructed to call 24/7 for any worsening of vision. Check Both eyes daily. Gave patient my home phone number.  Risks, benefits, and alternatives to treatment discussed in detail with the patient.  The patient voiced understanding and wished to proceed with the procedure     Injection Procedure Note:     Patient Identified and Time Out complete  Subconjunctival bleb - xylocaine with epi 2%   and Betadine.  Inject eylea os at 6:00 @ 3.5-4mm posterior to limbus  Post Operative Dx: Same  Complications: None  Follow up as above.      ===========================

## 2020-06-23 ENCOUNTER — OFFICE VISIT (OUTPATIENT)
Dept: PHYSICAL MEDICINE AND REHAB | Facility: CLINIC | Age: 85
End: 2020-06-23
Payer: MEDICARE

## 2020-06-23 VITALS
DIASTOLIC BLOOD PRESSURE: 97 MMHG | HEART RATE: 88 BPM | BODY MASS INDEX: 25.27 KG/M2 | RESPIRATION RATE: 16 BRPM | HEIGHT: 67 IN | SYSTOLIC BLOOD PRESSURE: 193 MMHG | WEIGHT: 161 LBS

## 2020-06-23 DIAGNOSIS — R29.898 ARM WEAKNESS: ICD-10-CM

## 2020-06-23 DIAGNOSIS — M79.18 DIFFUSE MYOFASCIAL PAIN SYNDROME: Primary | ICD-10-CM

## 2020-06-23 DIAGNOSIS — J44.9 COPD, SEVERE: Primary | ICD-10-CM

## 2020-06-23 PROCEDURE — 99999 PR PBB SHADOW E&M-EST. PATIENT-LVL IV: CPT | Mod: PBBFAC,,, | Performed by: PHYSICAL MEDICINE & REHABILITATION

## 2020-06-23 PROCEDURE — 99214 PR OFFICE/OUTPT VISIT, EST, LEVL IV, 30-39 MIN: ICD-10-PCS | Mod: S$PBB,,, | Performed by: PHYSICAL MEDICINE & REHABILITATION

## 2020-06-23 PROCEDURE — 99214 OFFICE O/P EST MOD 30 MIN: CPT | Mod: PBBFAC | Performed by: PHYSICAL MEDICINE & REHABILITATION

## 2020-06-23 PROCEDURE — 99999 PR PBB SHADOW E&M-EST. PATIENT-LVL IV: ICD-10-PCS | Mod: PBBFAC,,, | Performed by: PHYSICAL MEDICINE & REHABILITATION

## 2020-06-23 PROCEDURE — 99214 OFFICE O/P EST MOD 30 MIN: CPT | Mod: S$PBB,,, | Performed by: PHYSICAL MEDICINE & REHABILITATION

## 2020-06-23 NOTE — PROGRESS NOTES
"PM&R CLINIC NOTE    Chief Complaint   Patient presents with    Shoulder Pain     left       HPI: This is a 89 y.o.  male being seen in clinic today for left shoulder pain that began after an episode of shingles a year ago.  Her reports having a residual constant sharp pain in his shoulder that can increase with increased arm usage.  He states he feels "knots" in his shoulder area and has been trying to use topical agents for relief.  He denies numbness, tingling, weakness.    History obtained from patient    Review of Systems:     General- denies lethargy, weight change, fever, chills  Head/neck- denies swallowing difficulties  ENT- +hearing changes  Cardiovascular-denies chest pain  Pulmonary- +shortness of breath- COPD on O2  GI- denies constipation or bowel incontinence  - denies bladder incontinence  Skin- denies wounds or rashes  Musculoskeletal- +weakness, +pain  Neurologic- +/-numbness and tingling  Psychiatric- denies depressive or psychotic features, denies anxiety  Lymphatic-denies swelling  Endocrine- denies hypoglycemic symptoms/DM history    Physical Examination:  General: Well developed, well nourished male, NAD  HEENT: NCAT EOMI  Pulmonary: Normal respirations    Spinal Examination: CERVICAL  Active ROM is within normal limits.  Inspection: No deformity of spinal alignment.   Palpation: ttp at left trapezius, teres minor, rhomboid with palpable bands    Spinal Examination: LUMBAR or THORACIC  Active ROM is within normal limits.  Inspection: No deformity of spinal alignment.    Bilateral Upper and Lower Extremities:  Shoulder/Elbow/Wrist/Hand ROM wnl neg neers, ballard, drop arm on left, mild rotator cuff weakness   Hip/Knee/Ankle ROM   Bilateral Extremities show normal capillary refill.  No signs of cyanosis, rubor, edema, skin changes, or dysvascular changes of appendages.  Nails appear intact.    Neurological Exam:  Cranial Nerves:  II-XII grossly intact    Manual Muscle Testing: (Motor " 5=normal)    RIGHT Upper extremity: Shoulder abduction 5/5, Biceps 5/5, Triceps 5/5, Wrist extension 5/5, Abductor pollicis brevis 5/5, Ulnar hand intrinsics 5/5,  LEFT Upper extremity: Shoulder abduction 5/5, Biceps 5/5, Triceps 5/5, Wrist extension 5/5, Abductor pollicis brevis 5/5, Ulnar hand intrinsics 5/5,  No focal atrophy is noted of either upper or lower extremity.    Gait: Narrow base and good arm swing.    IMPRESSION/PLAN: This is a 89 y.o.  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 PT-Lewy--Rotator cuff strengthening, posture, myofascial release, ROM, dec pain, modalities   2. Cont tylenol prn, ice/heat modalities  3. Handouts on neck/shoulder exercises provided    Sudha May M.D.  Physical Medicine and Rehab

## 2020-06-23 NOTE — PATIENT INSTRUCTIONS
Shoulder Exercises: Biceps Curl    This exercise stretches and strengthens your shoulders and arms. Before starting, read through all the instructions. During the exercise, breathe normally and use smooth movements. Stop if you feel any pain. If pain persists, call your healthcare provider.  · Hold a ____ pound weight in each hand, with your palms facing your body. Tuck your arms close to your sides.  · Bend your left elbow and raise the weight to your left shoulder. As you lower that weight, bend your right elbow and raise the weight to your right shoulder. Continue to alternate arms.  · Repeat ____ times. Do ____ sets ____ times a day.     CAUTION: Keep your arms close to your body throughout the exercise. Keep your wrists straight.   Date Last Reviewed: 8/16/2015  © 5756-2317 [employer]. [address] [place] [address]. All rights reserved. This information is not intended as a substitute for professional medical care. Always follow your healthcare professional's instructions.        Shoulder Exercises: External Rotation    Strengthening exercises help make your injured shoulder more stable. To warm up, do flexibility (stretching) exercises first. Your healthcare provider will tell you what size hand weights to use for the strengthening exercise below. If you dont have hand weights, try using cans of soup instead:  · Lie on your uninjured side with your head supported by a pillow or your arm. Place a small rolled-up towel under your top elbow.  · Grasp a hand weight with your top hand and bend that arm to a right angle, resting your forearm against your stomach.  · Keeping your elbow against the towel, slowly lift the weight until your forearm is slightly higher than your elbow. Return to the starting position. Repeat.  · Work up to 5 to 15 lifts.  Date Last Reviewed: 8/16/2015  © 2225-8013 [employer]. [address] [place] [address]. All rights reserved.  This information is not intended as a substitute for professional medical care. Always follow your healthcare professional's instructions.        Shoulder Exercises: Internal Rotation    Strengthening exercises help make your injured shoulder more stable. To warm up, do flexibility (stretching) exercises first. Your healthcare provider will tell you what size hand weights to use for the strengthening exercise below. If you dont have hand weights, try using cans of soup instead:  · With knees bent, lie on a firm surface. Using the hand on the same side as your injured shoulder, grasp a weight. Bend that arm to a right angle (90 degrees).  · Rest your elbow on the floor.  · Keeping your elbow next to your side, lower your forearm toward the floor, away from your body. Do not lower your hand all the way to the floor.  · Slowly return your forearm to your side. Repeat.  · Work up to 5 to 15 lifts.     Note: Support your head and neck with a pillow.   Date Last Reviewed: 9/30/2015  © 9580-3876 [employer]. [address] [place] [address]. All rights reserved. This information is not intended as a substitute for professional medical care. Always follow your healthcare professional's instructions.        Shoulder Exercises: Shoulder Press    This exercise stretches and strengthens your shoulders. Before starting, read through all the instructions. During the exercise, breathe normally and use smooth movements. Stop if you feel any pain. If pain persists, call your healthcare provider.  · Hold a ____ pound weight in each hand, elbows at shoulder level, palms facing forward. Arms to the side and slightly forward.   · Raise one arm up until its almost straight. Hold for a second. Lower the weight, extending the other arm up.  · Repeat ____ times with each arm. Do ____ sets ____ times a day.  CAUTION: If you have shoulder problems, consult your health care provider before doing this exercise. Keep  your head and body still during the exercise. Only your arms should move.   Date Last Reviewed: 8/16/2015 © 2000-2017 [employer]. [address] [place] [address]. All rights reserved. This information is not intended as a substitute for professional medical care. Always follow your healthcare professional's instructions.        Shoulder Exercises: Side Raise    This exercise stretches and strengthens your shoulders. Before starting, read through all the instructions. During the exercise, breathe normally and use smooth movements. Stop if you feel any pain. If pain persists, call your healthcare provider.  · Stand straight, holding a ____ pound weight in each hand, arms at sides, feet shoulder-width apart.  · Slowly extend your arms up and out until weights are at shoulder level. Slowly return to starting position.  · Repeat ____ times. Do ____ sets ____ times a day.     CAUTION: Dont swing the weights or raise weights above shoulder level.   Date Last Reviewed: 8/16/2015 © 2000-2017 [employer]. [address] [place] [address]. All rights reserved. This information is not intended as a substitute for professional medical care. Always follow your healthcare professional's instructions.        Shoulder Exercises: Triceps Press    This exercise stretches and strengthens your shoulders. Before starting, read through all the instructions. During the exercise, breathe normally and use smooth movements. Stop if you feel any pain. If pain persists, call your healthcare provider.  · Grasp a ___ pound  weight in each hand. Raise one arm overhead. Hold that arm close to your ear. Bend your elbow and lower the weight behind your head, as far as you can, being careful not to hit your head.   · Slowly straighten your elbow, extending your arm upward. Return to starting position.  · Repeat ____ times with each arm. Do ____ sets ____ times a day.  CAUTION: Keep your head still and  neck straight. Keep your arm close to your ear. Dont arch your back.   Date Last Reviewed: 8/16/2015  © 3004-2688 [employer]. [address] [place] [address]. All rights reserved. This information is not intended as a substitute for professional medical care. Always follow your healthcare professional's instructions.        Shoulder Exercises: Wall Pushup    Strengthening exercises help make your injured shoulder more stable by making the muscles that support your shoulder stronger. To warm up, do flexibility (stretching) exercises first.  · With feet and hands shoulder-width apart, place your palms on the wall, standing about an arms length away.  · Keeping your knees straight and heels on the floor, bend your elbows and lean forward as far as you comfortably can. Your elbows should be pointing downward. Then push away from the wall to the starting position. Repeat.  · Work up to 15 wall pushups.     Note: Wear shoes that keep you from slipping.   Date Last Reviewed: 9/3/2015  © 6014-7015 [employer]. [address] [place] [address]. All rights reserved. This information is not intended as a substitute for professional medical care. Always follow your healthcare professional's instructions.          Myofascial Pain Syndrome: Fibrositis  Your pain is caused by a state of chronic muscle tension. This condition is called by various names: myofascial pain, fibrositis and trigger point pain. This can also be due to mechanical stress (such as working at a computer terminal for long periods; or work that requires repetitive motions of the arms or hands) or emotional stress (such as problems on the job or in your personal life). Sometimes there is no obvious cause. The pain can occur in the area of the muscle spasm or at a site distant to it. For example, spasm of a neck muscle can cause headache. Spasm of the muscle near the shoulder blade can cause pain shooting down the  arm.  Home Care:  · Try to identify the factors that may be causing your problem and change them:  ¨ If you feel that emotional stress is a cause of your pain, learn methods to deal more effectively with the stress in your life. These may include regular exercise, muscle relaxation techniques, meditation or simply taking time out for yourself. Consult your doctor or go to a local bookstore and review the many books and tapes available on the subject of stress reduction.  ¨ If you feel that physical stress is a cause for your pain, try to modify any poor work habits.  · You may use acetaminophen (Tylenol) or ibuprofen (Motrin, Advil) to control pain, unless another medicine was prescribed. [NOTE: If you have chronic liver or kidney disease or ever had a stomach ulcer or GI bleeding, talk with your doctor before using these medicines.]  · The use of heat to the muscle (hot compress or heating pad) will be helpful to reduce muscle spasm. Some persons get relief with ice packs. Apply an ice pack (crushed or cubed ice in a plastic bag, wrapped in a towel) for 20 minutes at a time as needed. Use the method that feels best to you.  · Massaging the trigger point and stretching out the muscle are an important parts of prevention and treatment. Trigger point massage can be done by first applying heat to the area to warm and prepare the muscle. Have someone apply steady thumb pressure directly on the knot in the muscle (the most tender point) for 30 seconds. Release the pressure, then massage the surrounding muscle. Repeat the process, applying more pressure to the trigger point each time. Do this up to the limit of pain. With each treatment, the trigger point should become less tender and the pain should decrease. You can apply local pressure to trigger points in the back by lying on the floor with a tennis ball under the trigger point.  Follow Up  with your doctor as advised or if not improving within the next week. It may  be necessary for you to receive physical therapy if you do not respond to home treatment alone.  Get Prompt Medical Attention  if any of the following occur:  · If your trigger point is in the chest muscles, observe for pain that becomes more severe, lasts longer, or spreads into your shoulder/arm, neck or back; you develop trouble breathing, sweating, nausea or vomiting in association with chest pain  · If you develop weakness or numbness in an extremity  · If your pain worsens, regardless of its location  © 3929-5599 [employer]. [address] [place] [address]. All rights reserved. This information is not intended as a substitute for professional medical care. Always follow your healthcare professional's instructions.          Trigger Point Injection  The cause of your muscle pain or spasms may be one or more trigger points. Your health care provider may decide to inject the painful spots to relax the muscle. This can help relieve your pain. Relaxing the muscle can also make movement easier. You may then be able to exercise to strengthen the muscle and help it heal.    What is a trigger point?  A trigger point is a tight, painful knot of muscle fiber. It can form where a muscle is strained or injured. The knot can sometimes be felt under the skin. A trigger point is very tender to the touch. Pain may also spread to other parts of the affected muscle. Muscles around a knee, shoulder blade, or other bones are prone to trigger points. This is because these muscles are more likely to be injured.    About the injections  Any muscle in the body can have one or more trigger points. Several injections may be needed in each trigger point to best relieve pain. These injections may be given in sessions about 2 weeks apart, depending on the preference of your health care provider. In some cases, you may not feel much change in your symptoms until after the third injection.     © 1103-3557 [employer]. [address] [place] [address]. All rights reserved. This information is not intended as a substitute for professional medical care. Always follow your healthcare professional's instructions.            Your Neck Muscles  The muscles in the neck and shoulders need to be strong to hold the neck and head in place. These muscles also help move the neck and shoulders. Your health care provider can recommend exercises to help stretch and strengthen your neck muscles.    © 9890-1024 [employer]. [address] [place] [address]. All rights reserved. This information is not intended as a substitute for professional medical care. Always follow your healthcare professional's instructions.          Neck Problems: Relieving Your Symptoms  The first goal of treatment is to relieve your symptoms. Your health care provider may recommend self-care treatments. These include resting, applying ice and heat, taking medication, and doing exercises. Your health care provider may also recommend that you see a physical therapist, who can teach you ways to care for and strengthen your neck.    Self-Care Treatments  Pain can end quickly or last awhile. Either way, youll want relief as soon as possible. Your health care provider can tell you which treatments to do at home to help relieve your pain.  · Lying down for a short time takes pressure from the head off the neck.  · Ice and heat can help reduce pain. To bring down swelling, rest an ice pack wrapped in a thin towel on your neck for 15 minutes. To relax sore muscles, apply a warm, wet towel to the area. Or take a warm bath or shower.  · Over-the-counter medications, such as ibuprofen, naproxen, and aspirin, can help reduce pain and swelling. Acetaminophen can help relieve pain. Use these only as directed.  · Exercises can relax muscles and prevent stiffness. To prepare, drape a warm, wet towel around your neck and shoulders for  5 minutes. Remove the towel. Then do any exercises recommended to you by your health care provider.  Physical Therapy  If self-care treatments arent helping relieve neck pain, your health care provider may suggest one or more sessions of physical therapy. Physical therapy is performed by a specialist trained to treat injuries. Your physical therapist (PT) will teach you how to strengthen muscles, improve the spines alignment, and help you move properly. Treatment methods used in physical therapy may include:  · Heat. A special heating pad called a neck pack may be applied to your neck.  · Exercises. Your PT will teach you exercises to help strengthen your neck and improve its range of motion.  · Joint mobilization. The PT gently moves your vertebrae to help restore motion in your neck joints and reduce neck pain.  · Soft tissue mobilization. The PT massages and stretches the muscles in your neck and shoulders.  · Electrical stimulation. Electrical impulses are sent into your neck. This helps reduce soreness and inflammation.  · Education in body mechanics. The PT shows you ways to position and move your body that protect the neck.  Other Treatments  If physical therapy doesnt relieve your neck pain, your health care provider may suggest other treatments. For example, medications or injections can help relieve pain and swelling. In some cases, surgery may be needed to treat neck problems.  © 6891-6811 [employer]. [address] [place] [address], [place] [address]. All rights reserved. This information is not intended as a substitute for professional medical care. Always follow your healthcare professional's instructions.          Understanding Neck Problems       If you suffer from neck pain, youre not alone. Many people have neck pain at some point in their lives. Problems such as poor posture, injury, and wear and tear can lead to neck pain. Your health care provider will work with you to find the  treatment thats best for your neck.  Types of Neck Problems  The following problems can cause pain or injury in your neck:  · Strains and sprains: Strains (stretched or torn muscles) and sprains (stretched or torn ligaments) can cause neck pain. Strains and sprains can occur during an accident, or when you overuse your neck through repetitive motion. They can also cause your muscles and ligaments to become inflamed (swollen and painful).  · Whiplash and other injuries: Whiplash can result when an impact throws your head, forcing your neck too far forward (hyperflexion), then too far backward (hyperextension). When combined, the two motions can cause a painful injury to different parts of your neck, such as muscles, ligaments, or joints. The most common cause of whiplash is a car accident. But it can also happen during a fall or sports injury.  · Weakened disks: A simple action, such as a sneeze or a cough, can cause one of your disks to bulge (herniate). A herniated disk can put pressure on your nerve and cause pain. Over time, disks can also thin out (degenerate). Flattened disks dont cushion vertebrae well and can cause vertebrae to rub together. Rubbing vertebrae can pinch nerves and cause pain.  · Weakened joints: Aging and injury can cause joints to slowly degenerate. Thinned joints can also cause vertebrae to rub together. This can cause abnormal growths of bone (bone spurs) to form on vertebrae. Bone spurs put pressure on nerves, causing pain.  Common Symptoms  If you have a neck problem, you may have one or more of the following symptoms:  · Muscle tension and spasm: You may not be able to move your neck, arms, or shoulders comfortably if you have muscle tension or stiffness in your neck. If your symptoms arent relieved, you may experience muscle spasms, or knots of contracted tissue (trigger points) in areas of your neck and shoulders.  · Aches and pains: Dull aches in your head or neck, sharp pains, and  swelling of the soft tissue of your neck and shoulders are common symptoms. If theres pressure on the nerves in your neck, you may feel pain in your arms or hands (referred pain).  · Numbness or weakness: If you injure the nerves in your neck, you may experience numbness, tingling, or weakness in your shoulders, arms, or hands. These symptoms arise when disks or bone spurs press on the nerves in your neck.  © 9602-6706 [employer]. [address] [place] [address]. All rights reserved. This information is not intended as a substitute for professional medical care. Always follow your healthcare professional's instructions.          Neck Spasm [No Trauma]    Spasm of the neck muscles can occur after a sudden awkward neck movement. Sleeping with your neck in a crooked position can also cause spasm. Some persons respond to emotional stress by tensing the muscles of their neck, shoulders and upper back. If neck spasm lasts long enough, it can cause headache.  The treatment described below will usually help the pain to go away in 5-7 days. Pain that continues may require further evaluation or other types of treatment such as physical therapy.  Home Care:  1. Rest and relax the muscles. Use a comfortable pillow that supports the head and keeps the spine in a neutral position. The position of the head should not be tilted forward or backward. A rolled up towel may help for a custom fit.  2. Some persons find relief with heat (hot shower, hot bath or heating pad) and massage, while others prefer cold packs (crushed or cubed ice in a plastic bag, wrapped in a towel). Try both and use the method that feels best for 20 minutes several times a day.  3. You may use acetaminophen (Tylenol) or ibuprofen (Motrin, Advil) to control pain, unless another medicine was prescribed. [NOTE: If you have chronic liver or kidney disease or ever had a stomach ulcer or GI bleeding, talk with your doctor before using these  medicines.]  Follow Up  with your doctor or this facility if your symptoms do not show signs of improvement after one week. Physical therapy or further evaluation may be needed.  [NOTE: If x-rays were taken, they will be reviewed by a radiologist. You will be notified of any new findings that may affect your care.]  Return Promptly  or contact your doctor if any of the following occurs:  · Pain becomes worse or spreads into one or both arms  · Weakness or numbness in one or both arms  · Increasing headache with nausea or vomiting  · Fever over 100.4ºF (38.0ºC)  © 9055-2070 [employer]. [address] [place] [address]. All rights reserved. This information is not intended as a substitute for professional medical care. Always follow your healthcare professional's instructions.          Know Your Neck: The Cervical Spine  By learning about the parts of the neck, you can better understand your neck problem. The bones of the neck are called cervical vertebrae, commonly identified as C1 through C7. Together, they form a bony column called the spine. Vertebrae also protect the spinal cord, a pathway for messages to reach the brain. Surrounding the spine are soft tissues such as muscles, tendons, and nerves.        Flexibility Is Key  For the neck to function normally, it has to be flexible enough to move without discomfort. A healthy neck can move easily in six different directions.    © 4012-6212 [employer]. [address] [place] [address]. All rights reserved. This information is not intended as a substitute for professional medical care. Always follow your healthcare professional's instructions.          Protecting Your Neck: Posture and Body Mechanics  Protecting your neck from injuries and pain involves practicing good posture and body mechanics. This may mean correcting bad habits you have related to the way you hold and move your body. The tips below can help you  improve your posture and body mechanics.    What Is Posture and Why Does It Matter?  Posture is the way you hold your body. For many of us, this means hunching over, thrusting the chin forward, and slouching the shoulders. But this kind of poor posture keeps muscles from properly supporting the neck and puts stress on muscles, disks, ligaments, and joints in your neck. As a result, injury and pain can occur.  How Is Your Posture?  Use a full-length mirror to check your posture. To begin, stand normally. Then slowly back up against a wall. Is there space between your head and the wall? Do you slouch your shoulders? Is your chin pointing up or down? All these can cause neck pain and injury.  Improving Your Posture  Follow these steps to improve your posture:  · Pull your shoulders back.  · Think of the ears, shoulders, and hips as a series of dots. Now, adjust your body to connect the dots in a straight line.  · Keep your chin level.  What Are Body Mechanics and Why Do They Matter?  The way you move and position your body during daily activities is called body mechanics. Good body mechanics help protect the neck. This means learning the right ways to stand, sit, and even sleep. So do whats best for your neck and practice good body mechanics.  Standing   To protect your neck while standing:  · Carry objects close to your body.  · Keep your ears and shoulders in a line while standing or walking.  · To lower yourself, bend at the knees with a straight back. Do this instead of looking down and reaching for objects.  · Work at eye level. Dont reach above your head or tilt your head back.  Sitting   To protect your neck while sitting:  · Set up your workstation so your monitor is at eye level. Also, use a document freitas when viewing papers or books.  · Keep your knees at or slightly below the level of your hips.  · Sit up straight, with feet flat on the floor. If your feet dont touch the floor, use a footrest.  · Avoid  sitting or driving for long periods. Take frequent breaks.  Sleeping   To protect your neck while sleeping:  · Sleep on your back with a pillow under your knees, or on your side with a pillow between bent knees. This helps align the spine.  · Avoid using pillows that are too high or too low. Instead, use a neck roll or pillow under your neck while you sleep to keep the neck straight.  · Sleep on a mattress that supports you, with a pillow under your neck.  © 4987-0905 [employer]. [address] [place] [address]. All rights reserved. This information is not intended as a substitute for professional medical care. Always follow your healthcare professional's instructions.          Exercises at Your Workstation: Eyes, Neck, and Head     Tired eyes? Stiff neck? A few easy moves can help prevent these kinds of problems. Take a few minutes during your day to do these exercises--right at your desk. They'll loosen up your muscles, keep you more alert, and make a big difference in how you work and feel.    For your eyes  Eye cup  · Lean forward with your elbows on your desk.  · Cup your hands and place them lightly over your closed eyes. Hold for a minute, while breathing deeply in and out.  · Slowly uncover and open your eyes. Repeat 2 times.  Eye roll  · Close your eyes. Slowly roll your eyeballs clockwise all the way around. Repeat 3 times.  · Now slowly roll them all the way around counterclockwise. Repeat 3 times.  Eye rest  · Every 20 minutes, look away from the computer screen. Focus on an object at least 20 feet away. Stay focused on this object for a full 20 seconds.    For your neck and head  Warm-up  · Drop your head gently to your chest. While breathing in, slowly roll your head up to your left shoulder. While breathing out, slowly roll your head back to center. Repeat to the right.  · Repeat 3 times on each side.  Head tilt  · Sit up straight. Tuck in your chin.  · Slowly tip your head to  the left. Return to the center. Then, tip your head to the right.  · Repeat 3 times on each side.    Head turn  · Sit up straight.  · Slowly turn your head and look over your left shoulder. Hold for a few seconds. Go back to the center, then repeat to your right.  · Repeat 3 times on each side.  © 7719-6987 [employer]. [address] [place] [address]. All rights reserved. This information is not intended as a substitute for professional medical care. Always follow your healthcare professional's instructions.          Reach and Hold Exercise    Do this exercise on your hands and knees. Keep your knees under your hips and your hands under your shoulders. Keep your spine in a neutral position (not arched or sagging). Keep your ears in line with your shoulders. Hold for a few seconds before starting the exercise:  4. Tighten your abdominal muscles and raise one arm straight in front of you, palm down. Hold for 5 seconds, then lower. Repeat 5 times.  5. Do the exercise again, this time lifting your arm to the side. Repeat 5 times.  6. Do the exercise again, this time lifting your arm backward, palm up. Repeat 5 times.  Switch sides and do each exercise with the other arm.  © 5313-3264 [employer]. [address] [place] [address]. All rights reserved. This information is not intended as a substitute for professional medical care. Always follow your healthcare professional's instructions.        Shoulder and Upper Back Stretch  To start, stand tall with your ears, shoulders, and hips in line. Your feet should be slightly apart, positioned just under your hips. Focus your eyes directly in front of you.  this position for a few seconds before starting your exercise. This helps increase your awareness of proper posture.  Reach overhead and slightly back with both arms. Keep your shoulders and neck aligned and your elbows behind your shoulders:  · With your palms facing  the ceiling, turn your fingers inward.  · Take a deep breath. Breathe out, and lower your elbows toward your buttocks. Hold for 5 seconds, then return to starting position.  · Repeat 3 times.    © 8283-3729 [employer]. [address] [place] [address]. All rights reserved. This information is not intended as a substitute for professional medical care. Always follow your healthcare professional's instructions.          Shoulder Clock Exercise  To start, stand tall with your ears, shoulders, and hips in line. Your feet should be slightly apart, positioned just under your hips. Focus your eyes directly in front of you.  this position for a few seconds before starting your exercise. This helps increase your awareness of proper posture.  · Imagine that your right shoulder is the center of a clock. With the outer point of your shoulder, roll it around to slowly trace the outer edge of the clock.  · Move clockwise first, then counterclockwise.  · Repeat 3 times. Switch shoulders.   © 8603-7641 [employer]. [address] [place] [address]. All rights reserved. This information is not intended as a substitute for professional medical care. Always follow your healthcare professional's instructions.          Shoulder Girdle Stretch     To start, sit in a chair with your feet flat on the floor. Your weight should be slightly forward so that youre balanced evenly on your buttocks. Relax your shoulders and keep your head level. Using a chair with arms may help you keep your balance:  · Place 1 hand on the outside elbow of the other arm.  · Pull the arm across your body. Hold for 30 to 60 seconds. Repeat once.  · Switch sides.    © 5092-7357 [employer]. [address] [place] [address]. All rights reserved. This information is not intended as a substitute for professional medical care. Always follow your healthcare professional's  instructions.          Shoulder Exercises      To start, sit in a chair with your feet flat on the floor. Your weight should be slightly forward so that youre balanced evenly on your buttocks. Relax your shoulders and keep your head level. Avoid arching your back or rounding your shoulders. Using a chair with arms may help you keep your balance.  · Raise your arms, elbows bent, to shoulder height.  · Slowly move your forearms together. Hold for 5 seconds.  · Return to starting position. Repeat 5 times.  © 9032-3107 [employer]. [address] [place] [address]. All rights reserved. This information is not intended as a substitute for professional medical care. Always follow your healthcare professional's instructions.        Shoulder Shrug Exercise  To start, sit in a chair with your feet flat on the floor. Shift your weight slightly forward to avoid rounding your back. Relax. Keep your ears, shoulders, and hips aligned:  · Raise both of your shoulders as high as you can, as if you were trying to touch them to your ears. Keep your head and neck still and relaxed.  · Hold for a count of 10. Release.  · Repeat 5 times.    © 1782-6275 [employer]. [address] [place] [address]. All rights reserved. This information is not intended as a substitute for professional medical care. Always follow your healthcare professional's instructions.          Shoulder Squeeze Exercise     To start, sit in a chair with your feet flat on the floor. Shift your weight slightly forward to avoid rounding your back. Relax. Keep your ears, shoulders, and hips aligned:  · Raise your arms to shoulder height, elbows bent and palms forward.  · Move your arms back, squeezing your shoulder blades together.  · Hold for 10 seconds. Return to starting position.   · Repeat 5 times.     © 5971-7658 [employer]. [address] [place] [address]. All rights reserved. This information  is not intended as a substitute for professional medical care. Always follow your healthcare professional's instructions.

## 2020-06-24 ENCOUNTER — OFFICE VISIT (OUTPATIENT)
Dept: OPHTHALMOLOGY | Facility: CLINIC | Age: 85
End: 2020-06-24
Payer: MEDICARE

## 2020-06-24 DIAGNOSIS — H35.3221 EXUDATIVE AGE-RELATED MACULAR DEGENERATION OF LEFT EYE WITH ACTIVE CHOROIDAL NEOVASCULARIZATION: ICD-10-CM

## 2020-06-24 DIAGNOSIS — H25.11 NUCLEAR SENILE CATARACT OF RIGHT EYE: ICD-10-CM

## 2020-06-24 DIAGNOSIS — H35.3213 EXUDATIVE AGE-RELATED MACULAR DEGENERATION OF RIGHT EYE WITH INACTIVE SCAR: ICD-10-CM

## 2020-06-24 DIAGNOSIS — H25.042 POSTERIOR SUBCAPSULAR AGE-RELATED CATARACT, LEFT EYE: Primary | ICD-10-CM

## 2020-06-24 PROCEDURE — 99999 PR PBB SHADOW E&M-EST. PATIENT-LVL III: CPT | Mod: PBBFAC,,, | Performed by: OPHTHALMOLOGY

## 2020-06-24 PROCEDURE — 99999 PR PBB SHADOW E&M-EST. PATIENT-LVL III: ICD-10-PCS | Mod: PBBFAC,,, | Performed by: OPHTHALMOLOGY

## 2020-06-24 PROCEDURE — 99213 OFFICE O/P EST LOW 20 MIN: CPT | Mod: PBBFAC | Performed by: OPHTHALMOLOGY

## 2020-06-24 PROCEDURE — 92014 PR EYE EXAM, EST PATIENT,COMPREHESV: ICD-10-PCS | Mod: S$PBB,,, | Performed by: OPHTHALMOLOGY

## 2020-06-24 PROCEDURE — 92136 OPHTHALMIC BIOMETRY: CPT | Mod: PBBFAC,LT | Performed by: OPHTHALMOLOGY

## 2020-06-24 PROCEDURE — 92136 IOL MASTER - OS - LEFT EYE: ICD-10-PCS | Mod: 26,S$PBB,LT, | Performed by: OPHTHALMOLOGY

## 2020-06-24 PROCEDURE — 92014 COMPRE OPH EXAM EST PT 1/>: CPT | Mod: S$PBB,,, | Performed by: OPHTHALMOLOGY

## 2020-06-24 RX ORDER — DUREZOL 0.5 MG/ML
1 EMULSION OPHTHALMIC 4 TIMES DAILY
Qty: 1 BOTTLE | Refills: 1 | Status: SHIPPED | OUTPATIENT
Start: 2020-06-24 | End: 2020-07-24

## 2020-06-24 RX ORDER — KETOROLAC TROMETHAMINE 5 MG/ML
1 SOLUTION OPHTHALMIC 4 TIMES DAILY
Qty: 1 BOTTLE | Refills: 3 | Status: SHIPPED | OUTPATIENT
Start: 2020-06-24 | End: 2020-07-24

## 2020-06-24 NOTE — PROGRESS NOTES
HPI     COPD       Patient was referred by [facility]  For cat eval OS, patient states his vision is   filmy.Patient will need preop eylea os. Trouble with reading.         Vigamox Prep/NO BETADINE/no Bleb    --Smd/srn od dx. 10/5/15   History of avastin and eylea od, last 1/20/17  --2+ vac cat ou  New Srn os dx and treated with avastin 3/15/19   Last Eylea OS 4/30/20    Last edited by Satish Ruelas MD on 6/24/2020 11:08 AM. (History)            Assessment /Plan     For exam results, see Encounter Report.      ICD-10-CM ICD-9-CM    1. Posterior subcapsular age-related cataract, left eye  H25.042 366.14 Visually Significant Cataract OS > OD  Patient reports decreased vision consistent with the clinical amount of lenticular opacity, which reaches the level of visual significance and affects activities of daily living including reading and glare. Risks, benefits, and alternatives to cataract surgery were discussed.   The pt expressed a desire to proceed with surgery with the potential for some reasonable degree of visual improvement.    Discussed IOL options and refractive outcomes for this patient.    Phaco left eye, Shugarcaine,     Block  monofocal IOL. Complex, small pupil. Mal Ring   Will aim for distance, 1st case due to coughing   Referral to [facility] for Ophthalmic surgery  Prescriptions sent for preoperative medications  Durezol and ketorolac  Explained that patient may need glasses after surgery.  Discussed that vision may be limited by retina     Patient was counciled regarding the increased risk of the surgery as a result of Phaco  which could lead to the need for an anterior vitrectomy.             2. Exudative age-related macular degeneration of left eye with active choroidal neovascularization  H35.3221 362.52 Followed by Dr Phillips   Will need Pre-op Avastin prior        362.16    3. 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 Nuclear senile cataract of right eye  H25.11 366.16 See above

## 2020-06-30 ENCOUNTER — TELEPHONE (OUTPATIENT)
Dept: PULMONOLOGY | Facility: CLINIC | Age: 85
End: 2020-06-30

## 2020-06-30 NOTE — TELEPHONE ENCOUNTER
----- Message from Landy Barnes sent at 6/30/2020 11:03 AM CDT -----  Regarding: returned call  Contact: Patients wife, Priyanka Mathew returned a call, please call her back at [phone]

## 2020-07-02 DIAGNOSIS — J44.9 COPD, SEVERE: Primary | ICD-10-CM

## 2020-07-05 ENCOUNTER — LAB VISIT (OUTPATIENT)
Dept: URGENT CARE | Facility: CLINIC | Age: 85
End: 2020-07-05
Payer: MEDICARE

## 2020-07-05 DIAGNOSIS — J44.9 COPD, SEVERE: ICD-10-CM

## 2020-07-05 PROCEDURE — U0003 INFECTIOUS AGENT DETECTION BY NUCLEIC ACID (DNA OR RNA); SEVERE ACUTE RESPIRATORY SYNDROME CORONAVIRUS 2 (SARS-COV-2) (CORONAVIRUS DISEASE [COVID-19]), AMPLIFIED PROBE TECHNIQUE, MAKING USE OF HIGH THROUGHPUT TECHNOLOGIES AS DESCRIBED BY CMS-2020-01-R: HCPCS

## 2020-07-06 LAB — SARS-COV-2 RNA RESP QL NAA+PROBE: NEGATIVE

## 2020-07-07 ENCOUNTER — PATIENT OUTREACH (OUTPATIENT)
Dept: ADMINISTRATIVE | Facility: OTHER | Age: 85
End: 2020-07-07

## 2020-07-08 ENCOUNTER — OFFICE VISIT (OUTPATIENT)
Dept: PULMONOLOGY | Facility: CLINIC | Age: 85
End: 2020-07-08
Payer: MEDICARE

## 2020-07-08 ENCOUNTER — HOSPITAL ENCOUNTER (OUTPATIENT)
Dept: RADIOLOGY | Facility: HOSPITAL | Age: 85
Discharge: HOME | End: 2020-07-08
Attending: INTERNAL MEDICINE
Payer: MEDICARE

## 2020-07-08 ENCOUNTER — CLINICAL SUPPORT (OUTPATIENT)
Dept: PULMONOLOGY | Facility: CLINIC | Age: 85
End: 2020-07-08
Payer: MEDICARE

## 2020-07-08 VITALS
HEART RATE: 86 BPM | SYSTOLIC BLOOD PRESSURE: 148 MMHG | DIASTOLIC BLOOD PRESSURE: 80 MMHG | OXYGEN SATURATION: 98 % | BODY MASS INDEX: 25.43 KG/M2 | HEIGHT: 67 IN | RESPIRATION RATE: 18 BRPM | WEIGHT: 162 LBS

## 2020-07-08 DIAGNOSIS — J44.9 COPD, SEVERE: Chronic | ICD-10-CM

## 2020-07-08 DIAGNOSIS — I27.20 PULMONARY HYPERTENSION: ICD-10-CM

## 2020-07-08 DIAGNOSIS — R09.02 HYPOXEMIA REQUIRING SUPPLEMENTAL OXYGEN: ICD-10-CM

## 2020-07-08 DIAGNOSIS — Z99.81 HYPOXEMIA REQUIRING SUPPLEMENTAL OXYGEN: ICD-10-CM

## 2020-07-08 DIAGNOSIS — E78.2 MIXED HYPERLIPIDEMIA: ICD-10-CM

## 2020-07-08 DIAGNOSIS — I10 ESSENTIAL HYPERTENSION: ICD-10-CM

## 2020-07-08 DIAGNOSIS — R09.02 EXERCISE HYPOXEMIA: Chronic | ICD-10-CM

## 2020-07-08 DIAGNOSIS — J44.9 COPD, SEVERE: Primary | Chronic | ICD-10-CM

## 2020-07-08 DIAGNOSIS — J44.9 COPD, SEVERE: ICD-10-CM

## 2020-07-08 LAB
BRPFT: ABNORMAL
FEF 25 75 LLN: 0.33
FEF 25 75 PRE REF: 16.1 %
FEF 25 75 REF: 2.04
FEV1 FVC LLN: 59
FEV1 FVC PRE REF: 51.1 %
FEV1 FVC REF: 69
FEV1 LLN: 1.51
FEV1 PRE REF: 33.3 %
FEV1 REF: 2.3
FVC LLN: 2.2
FVC PRE REF: 68.8 %
FVC REF: 3.16
PEF LLN: 2.75
PEF PRE REF: 63.4 %
PEF REF: 4.86
PRE FEF 25 75: 0.33 L/S (ref 0.33–3.75)
PRE FET 100: 7.81 SEC
PRE FEV1 FVC: 35.28 % (ref 59.38–78.74)
PRE FEV1: 0.77 L (ref 1.51–3.09)
PRE FVC: 2.17 L (ref 2.2–4.12)
PRE PEF: 3.08 L/S (ref 2.75–6.98)

## 2020-07-08 PROCEDURE — 94010 BREATHING CAPACITY TEST: ICD-10-PCS | Mod: 26,S$PBB,, | Performed by: INTERNAL MEDICINE

## 2020-07-08 PROCEDURE — 71046 X-RAY EXAM CHEST 2 VIEWS: CPT | Mod: 26,,, | Performed by: RADIOLOGY

## 2020-07-08 PROCEDURE — 94010 BREATHING CAPACITY TEST: CPT | Mod: 26,S$PBB,, | Performed by: INTERNAL MEDICINE

## 2020-07-08 PROCEDURE — 99214 PR OFFICE/OUTPT VISIT, EST, LEVL IV, 30-39 MIN: ICD-10-PCS | Mod: 25,S$PBB,, | Performed by: INTERNAL MEDICINE

## 2020-07-08 PROCEDURE — 94010 BREATHING CAPACITY TEST: CPT | Mod: PBBFAC

## 2020-07-08 PROCEDURE — 99215 OFFICE O/P EST HI 40 MIN: CPT | Mod: PBBFAC,25 | Performed by: INTERNAL MEDICINE

## 2020-07-08 PROCEDURE — 71046 X-RAY EXAM CHEST 2 VIEWS: CPT | Mod: TC

## 2020-07-08 PROCEDURE — 71046 XR CHEST PA AND LATERAL: ICD-10-PCS | Mod: 26,,, | Performed by: RADIOLOGY

## 2020-07-08 PROCEDURE — 99999 PR PBB SHADOW E&M-EST. PATIENT-LVL V: CPT | Mod: PBBFAC,,, | Performed by: INTERNAL MEDICINE

## 2020-07-08 PROCEDURE — 99999 PR PBB SHADOW E&M-EST. PATIENT-LVL V: ICD-10-PCS | Mod: PBBFAC,,, | Performed by: INTERNAL MEDICINE

## 2020-07-08 PROCEDURE — 99214 OFFICE O/P EST MOD 30 MIN: CPT | Mod: 25,S$PBB,, | Performed by: INTERNAL MEDICINE

## 2020-07-08 RX ORDER — TIOTROPIUM BROMIDE INHALATION SPRAY 3.12 UG/1
2 SPRAY, METERED RESPIRATORY (INHALATION) DAILY
Qty: 4 G | Refills: 11 | Status: SHIPPED | OUTPATIENT
Start: 2020-07-08 | End: 2021-05-31 | Stop reason: SDUPTHER

## 2020-07-08 NOTE — ASSESSMENT & PLAN NOTE
DME: [facility]  Oxygen adherent 3.0 LPM  Has issue with DME not giving enough tanks  Will send HME other order

## 2020-07-08 NOTE — PROGRESS NOTES
Subjective:       Patient ID: Kelton Mcdaniels is a 89 y.o. male.  Patient Active Problem List   Diagnosis    Impaired fasting glucose    Essential hypertension    Esophageal reflux    History of colonic polyps    Lumbar radiculopathy    Exudative age-related macular degeneration of left eye with active choroidal neovascularization    CAD in native artery    Mixed hyperlipidemia    Pulmonary hypertension    TR (tricuspid regurgitation)    Multifocal atrial tachycardia    COPD, severe    Gross hematuria    Exercise hypoxemia    Hypoxemia requiring supplemental oxygen    Anxiety    History of bladder cancer    History of colon cancer    Entropion of left eyelid    BPH (benign prostatic hyperplasia)    Post herpetic neuralgia    Chronic nasal congestion    Exudative age-related macular degeneration of right eye with inactive scar    Dryness, eye     Immunization History   Administered Date(s) Administered    Influenza - High Dose - PF (65 years and older) 10/15/2010, 11/15/2011, 09/28/2012, 09/25/2013, 08/22/2014, 09/15/2015, 11/10/2016, 11/14/2017, 11/21/2018, 09/20/2019    Influenza - Trivalent (ADULT) 11/01/2006, 10/24/2007, 11/06/2008, 10/14/2009    Influenza Split 11/01/2006, 10/24/2007, 11/06/2008, 10/14/2009    Pneumococcal Conjugate - 13 Valent 01/20/2016    Pneumococcal Polysaccharide - 23 Valent 03/08/2002     Social History     Tobacco Use   Smoking Status Former Smoker    Packs/day: 2.00    Years: 70.00    Pack years: 140.00    Types: Cigarettes   Smokeless Tobacco Never Used     COPD Questionnaire  How often do you cough?: (P) Some of the time  How often do you have phlegm (mucus) in your chest?: (P) Some of the time  How often does your chest feel tight?: (P) A little of the time  When you walk up a hill or one flight of stairs, how often are you breathless?: (P) Most of the time  How often are you limited doing any activities at home?: (P) Most of the time  How often are  "you confident leaving the house despite your lung condition?: (P) Most of the time  How often do you sleep soundly?: (P) Most of the time  How often do you have energy?: (P) Some of the time  Total score: (P) 20      Chief Complaint: COPD    Mr. Kelton Mcdaniels is 89 y.o.    He has severe COPD with an FEV1 of 0.77 (33.3% predicted)  Here  reviewed CXR and Spirometry  Having issues with enough O2 tanks from [facility]  No fever, no cough  Chronic respiratory failure on Oxygen: Portable tanks,   Exertional dyspnea, occasional  cough nasal congestion  No fever  No reportable recent exacebations needing steriods  MRC grade score is 2, able to move trash to curb side  COPD test score is 4  Medications reviewed stable REGIME: Daliresp, Advair, DuoNeb and Spiriva Respimat    He is on oxygen supplement to the 2.0-3.0  L/m  His anxiety is much better controlled no longer use of the Xanax  Using self coping skills  Chest x-ray was reviewed is clear hyperinflation noted  Since last study: FEV1 improved from 0.65L to 0.77L, FVC improved 1.87L to 2.17L         Review of Systems   Constitutional: Positive for weight loss and fatigue.        Dropped from 164lb to 162 lb   HENT: Negative.    Eyes: Negative.    Respiratory: Positive for cough. Negative for snoring, sputum production, shortness of breath, wheezing and use of rescue inhaler.    Cardiovascular: Negative.    Genitourinary: Negative.    Endocrine: endocrine negative   Musculoskeletal: Negative.    Skin: Negative.         Easily bruises   Gastrointestinal: Negative.    Neurological: Negative.    Psychiatric/Behavioral: Negative.        Objective:       Vitals:    07/08/20 0931   BP: (!) 148/80   Pulse: 86   Resp: 18   SpO2: 98%  Comment: 2-3 liters   Weight: 73.5 kg (162 lb)   Height: 5' 7" (1.702 m)     Physical Exam   Constitutional: He is oriented to person, place, and time. Vital signs are normal. He appears well-nourished. He appears not cachectic. He has a sickly appearance. " Nasal cannula in place.       HENT:   Head: Normocephalic.   Nose: Nose normal.   Mouth/Throat: No oropharyngeal exudate.   Neck: Normal range of motion. Neck supple. No tracheal deviation present. No thyromegaly present.   Cardiovascular: Normal rate, regular rhythm and normal heart sounds.   No murmur heard.  Pulmonary/Chest: Normal expansion and hyperinflation. He has decreased breath sounds. He has no wheezes. He has no rhonchi. Negative for tactile fremitus.   Abdominal: Soft. Bowel sounds are normal.   Lymphadenopathy:     He has no cervical adenopathy.   Neurological: He is alert and oriented to person, place, and time.   Skin: Skin is dry. No cyanosis or erythema. Nails show no clubbing.   Psychiatric: He has a normal mood and affect. His behavior is normal.   Nursing note and vitals reviewed.    Personal Diagnostic Review      Pulmonary function tests: FEV1: 0.77 (33.3 % predicted), FVC:  2.17 (68.8 % predicted), FEV1/FVC:  35    CXR:   EXAMINATION:  XR CHEST PA AND LATERAL     CLINICAL HISTORY:  Chronic obstructive pulmonary disease, unspecified     TECHNIQUE:  PA and lateral views of the chest were performed.     COMPARISON:  10/16/2019     FINDINGS:  Cardiac silhouette and mediastinal contours are stable.  Lungs demonstrate no acute opacity with COPD hyperinflation.  Osseous structures are intact.     Impression:     No acute cardiopulmonary process.         Assessment:       Problem List Items Addressed This Visit     Exercise hypoxemia (Chronic)    COPD, severe - Primary (Chronic)     COPD ROS: taking medications as instructed, no medication side effects noted, no significant ongoing wheezing or shortness of breath, using bronchodilator MDI less than twice a week.      Mediations: Proair HFA, Tiotropium bromide, Accapella, Daliresp, Advair Diskus  nebuliser DUONEB PRN  CAT score 4  FEV1 : 0.77  L( 33.3%), FEV1/FVC 35  mRC score 2     New concerns: None.      Exam: appears well, vitals normal, no  "respiratory distress, acyanotic, normal RR, chest clear, no wheezing, crepitations, rhonchi, normal symmetric air entry.      Reviewed CXR and spirometry: STABLE  Assessment: COPD reasonably well controlled.     Plan: current treatment plan is effective, no change in therapy.         COPD continue  and DALIRESP , SPIRIVA RESPIMAT AND ADVAIR 250-50         Relevant Medications    tiotropium bromide (SPIRIVA RESPIMAT) 2.5 mcg/actuation Mist    Other Relevant Orders    X-Ray Chest PA And Lateral    Spirometry without Bronchodilator    HME - OTHER    Essential hypertension     STABLE: HCTZ and LISIONOPRIL         Mixed hyperlipidemia     STABLE ZOCOR         Pulmonary hypertension     Group 3 PH  PASP 37.7         Hypoxemia requiring supplemental oxygen     [place]: [facility]  Oxygen adherent 3.0 LPM  Has issue with DME not giving enough tanks  Will send HME other order         Relevant Orders    HME - OTHER        Plan:       COPD severe: stable  Weak:using Rolator walker  Overall COPD stable.  Frustrated about shoulder pain  Has handicap tag    Follow up in about 6 months (around 1/8/2021), or [email or web address], note to [facility] for extra tanks.    This note was prepared using voice recognition system and is likely to have sound alike errors that may have been overlooked even after proof reading.  Please call me with any questions    Discussed diagnosis, its evaluation, treatment and usual course. All questions answered.    Thank you for the courtesy of participating in the care of this patient    Vinicius Cat MD    Orders Placed This Encounter   Procedures    HME - OTHER     [facility] D[facility]  Needs 8 oxygen tanks monthly for doctors     Order Specific Question:   Type of Equipment:     Answer:   oxygen     Order Specific Question:   Height:     Answer:   5' 7" (1.702 m)     Order Specific Question:   Weight:     Answer:   73.5 kg (162 lb)    X-Ray Chest PA And Lateral     Standing Status:   Future     Standing Expiration Date:   7/8/2021 "    Spirometry without Bronchodilator     Standing Status:   Future     Standing Expiration Date:   7/8/2021

## 2020-07-08 NOTE — ASSESSMENT & PLAN NOTE
COPD ROS: taking medications as instructed, no medication side effects noted, no significant ongoing wheezing or shortness of breath, using bronchodilator MDI less than twice a week.      Mediations: Proair HFA, Tiotropium bromide, Accapella, Daliresp, Advair Diskus  nebuliser DUONEB PRN  CAT score 4  FEV1 : 0.77  L( 33.3%), FEV1/FVC 35  mRC score 2     New concerns: None.      Exam: appears well, vitals normal, no respiratory distress, acyanotic, normal RR, chest clear, no wheezing, crepitations, rhonchi, normal symmetric air entry.      Reviewed CXR and spirometry: STABLE  Assessment: COPD reasonably well controlled.     Plan: current treatment plan is effective, no change in therapy.         COPD continue  and DALIRESP , SPIRIVA RESPIMAT AND ADVAIR 250-50

## 2020-08-03 ENCOUNTER — OFFICE VISIT (OUTPATIENT)
Dept: INTERNAL MEDICINE | Facility: CLINIC | Age: 85
End: 2020-08-03
Payer: MEDICARE

## 2020-08-03 VITALS
HEART RATE: 95 BPM | WEIGHT: 166.44 LBS | DIASTOLIC BLOOD PRESSURE: 76 MMHG | TEMPERATURE: 98 F | BODY MASS INDEX: 26.12 KG/M2 | OXYGEN SATURATION: 98 % | SYSTOLIC BLOOD PRESSURE: 154 MMHG | HEIGHT: 67 IN

## 2020-08-03 DIAGNOSIS — R09.02 EXERCISE HYPOXEMIA: Chronic | ICD-10-CM

## 2020-08-03 DIAGNOSIS — I10 ESSENTIAL HYPERTENSION: Primary | ICD-10-CM

## 2020-08-03 DIAGNOSIS — R73.01 IMPAIRED FASTING GLUCOSE: ICD-10-CM

## 2020-08-03 DIAGNOSIS — Z85.038 HISTORY OF COLON CANCER: ICD-10-CM

## 2020-08-03 DIAGNOSIS — I27.20 PULMONARY HYPERTENSION: ICD-10-CM

## 2020-08-03 DIAGNOSIS — Z99.81 HYPOXEMIA REQUIRING SUPPLEMENTAL OXYGEN: ICD-10-CM

## 2020-08-03 DIAGNOSIS — N40.0 BENIGN PROSTATIC HYPERPLASIA, UNSPECIFIED WHETHER LOWER URINARY TRACT SYMPTOMS PRESENT: ICD-10-CM

## 2020-08-03 DIAGNOSIS — E78.2 MIXED HYPERLIPIDEMIA: ICD-10-CM

## 2020-08-03 DIAGNOSIS — J44.9 COPD, SEVERE: Chronic | ICD-10-CM

## 2020-08-03 DIAGNOSIS — Z85.51 HISTORY OF BLADDER CANCER: ICD-10-CM

## 2020-08-03 DIAGNOSIS — F41.9 ANXIETY: ICD-10-CM

## 2020-08-03 DIAGNOSIS — I25.10 CAD IN NATIVE ARTERY: ICD-10-CM

## 2020-08-03 DIAGNOSIS — I47.19 MULTIFOCAL ATRIAL TACHYCARDIA: ICD-10-CM

## 2020-08-03 DIAGNOSIS — K21.9 GASTROESOPHAGEAL REFLUX DISEASE, ESOPHAGITIS PRESENCE NOT SPECIFIED: ICD-10-CM

## 2020-08-03 DIAGNOSIS — R09.02 HYPOXEMIA REQUIRING SUPPLEMENTAL OXYGEN: ICD-10-CM

## 2020-08-03 PROCEDURE — 99999 PR PBB SHADOW E&M-EST. PATIENT-LVL IV: CPT | Mod: PBBFAC,,, | Performed by: INTERNAL MEDICINE

## 2020-08-03 PROCEDURE — 99214 PR OFFICE/OUTPT VISIT, EST, LEVL IV, 30-39 MIN: ICD-10-PCS | Mod: S$PBB,,, | Performed by: INTERNAL MEDICINE

## 2020-08-03 PROCEDURE — 99999 PR PBB SHADOW E&M-EST. PATIENT-LVL IV: ICD-10-PCS | Mod: PBBFAC,,, | Performed by: INTERNAL MEDICINE

## 2020-08-03 PROCEDURE — 99214 OFFICE O/P EST MOD 30 MIN: CPT | Mod: S$PBB,,, | Performed by: INTERNAL MEDICINE

## 2020-08-03 PROCEDURE — 99214 OFFICE O/P EST MOD 30 MIN: CPT | Mod: PBBFAC,PO | Performed by: INTERNAL MEDICINE

## 2020-08-03 NOTE — PROGRESS NOTES
"HPI:  Patient is a 89-year-old man who comes today for follow-up of his impaired fasting glucose, hyperlipidemia, hypertension, coronary artery disease, and severe COPD.  Patient is on home O2 at 2 L.  Patient at this time has no new problems or complaints.  Denies any change in his chronic dyspnea on exertion.  He denies any chest pain.  His blood pressure has been well controlled.      Current MEDS: medcard review, verified and update  Allergies: Per the electronic medical record    Past Medical History:   Diagnosis Date    BPH (benign prostatic hyperplasia)     Cataract     Chronic airway obstruction, not elsewhere classified     Coronary atherosclerosis of unspecified type of vessel, native or graft     Esophageal reflux     Essential hypertension     History of bladder cancer     History of colon cancer     Hyperlipidemia LDL goal <70     Impaired fasting glucose     Macular degeneration     Post herpetic neuralgia     Pulmonary hypertension 12/18/2015    TR (tricuspid regurgitation) 12/18/2015       Past Surgical History:   Procedure Laterality Date    BACK SURGERY      COLON SURGERY      CORONARY ARTERY BYPASS GRAFT      partial coloectomy for large polyp      turbt         SHx: per the electronic medical record    FHx: recorded in the electronic medical record    ROS:    denies any chest pains or shortness of breath. Denies any nausea, vomiting or diarrhea. Denies any fever, chills or sweats. Denies any change in weight, voice, stool, skin or hair. Denies any dysuria, dyspepsia or dysphagia. Denies any change in vision, hearing or headaches. Denies any swollen lymph nodes or loss of memory.    PE:  BP (!) 154/76 (BP Location: Left arm)   Pulse 95   Temp 98.1 °F (36.7 °C) (Tympanic)   Ht 5' 7" (1.702 m)   Wt 75.5 kg (166 lb 7.2 oz)   SpO2 98%   BMI 26.07 kg/m²   Gen: Well-developed, well-nourished, male, in no acute distress, oriented x3  HEENT: neck is supple, no adenopathy, carotids " 2+ equal without bruits, thyroid exam normal size without nodules.  CHEST: clear to auscultation and percussion, very distant breath sounds  CVS: regular rate and rhythm without significant murmur, gallop, or rubs  ABD: soft, benign, no rebound no guarding, no distention.  Bowel sounds are normal.     nontender.  No palpable masses.  No organomegaly and no audible bruits.  RECTAL:  Deferred  EXT: no clubbing, cyanosis, or edema  LYMPH: no cervical, inguinal, or axillary adenopathy  FEET: no loss of sensation.  No ulcers or pressure sores.  NEURO: gait normal.  Cranial nerves II- XII intact. No nystagmus.  Speech normal.   Gross motor and sensory unremarkable.    Lab Results   Component Value Date    WBC 10.86 03/13/2020    HGB 13.1 (L) 03/13/2020    HCT 42.3 03/13/2020     03/13/2020    CHOL 142 03/13/2020    TRIG 91 03/13/2020    HDL 38 (L) 03/13/2020    ALT 16 03/13/2020    AST 15 03/13/2020     03/13/2020    K 4.2 03/13/2020    CL 94 (L) 03/13/2020    CREATININE 0.7 03/13/2020    BUN 9 (L) 03/13/2020    CO2 35 (H) 03/13/2020    TSH 2.322 03/13/2020    PSA 3.8 08/15/2011    INR 1.0 12/08/2015    HGBA1C 6.0 (H) 03/13/2020       Impression:  Multiple medical problems below, stable  Patient Active Problem List   Diagnosis    Impaired fasting glucose    Essential hypertension    Esophageal reflux    History of colonic polyps    Lumbar radiculopathy    Exudative age-related macular degeneration of left eye with active choroidal neovascularization    CAD in native artery    Mixed hyperlipidemia    Pulmonary hypertension    TR (tricuspid regurgitation)    Multifocal atrial tachycardia    COPD, severe    Gross hematuria    Exercise hypoxemia    Hypoxemia requiring supplemental oxygen    Anxiety    History of bladder cancer    History of colon cancer    Entropion of left eyelid    BPH (benign prostatic hyperplasia)    Post herpetic neuralgia    Chronic nasal congestion    Exudative  age-related macular degeneration of right eye with inactive scar    Dryness, eye       Plan:   Orders Placed This Encounter    Comprehensive metabolic panel    Lipid Panel    CBC auto differential    TSH    Hemoglobin A1C     Medications remain the same.  He will be seen again in 6 months with above lab work.  This note is generated with speech recognition software and is subject to transcription error and sound alike phrases that may be missed by proofreading.

## 2020-08-19 ENCOUNTER — PROCEDURE VISIT (OUTPATIENT)
Dept: OPHTHALMOLOGY | Facility: CLINIC | Age: 85
End: 2020-08-19
Payer: MEDICARE

## 2020-08-19 DIAGNOSIS — H35.3213 EXUDATIVE AGE-RELATED MACULAR DEGENERATION OF RIGHT EYE WITH INACTIVE SCAR: ICD-10-CM

## 2020-08-19 DIAGNOSIS — H35.3221 EXUDATIVE AGE-RELATED MACULAR DEGENERATION OF LEFT EYE WITH ACTIVE CHOROIDAL NEOVASCULARIZATION: Primary | ICD-10-CM

## 2020-08-19 PROCEDURE — 92134 POSTERIOR SEGMENT OCT RETINA (OCULAR COHERENCE TOMOGRAPHY)-BOTH EYES: ICD-10-PCS | Mod: 26,S$PBB,, | Performed by: OPHTHALMOLOGY

## 2020-08-19 PROCEDURE — 67028 PR INJECT INTRAVITREAL PHARMCOLOGIC: ICD-10-PCS | Mod: S$PBB,LT,, | Performed by: OPHTHALMOLOGY

## 2020-08-19 PROCEDURE — 67028 INJECTION EYE DRUG: CPT | Mod: PBBFAC,LT | Performed by: OPHTHALMOLOGY

## 2020-08-19 PROCEDURE — 92134 CPTRZ OPH DX IMG PST SGM RTA: CPT | Mod: PBBFAC | Performed by: OPHTHALMOLOGY

## 2020-08-19 PROCEDURE — 99499 NO LOS: ICD-10-PCS | Mod: S$PBB,,, | Performed by: OPHTHALMOLOGY

## 2020-08-19 PROCEDURE — 99499 UNLISTED E&M SERVICE: CPT | Mod: S$PBB,,, | Performed by: OPHTHALMOLOGY

## 2020-08-19 PROCEDURE — 67028 INJECTION EYE DRUG: CPT | Mod: S$PBB,LT,, | Performed by: OPHTHALMOLOGY

## 2020-08-19 RX ADMIN — AFLIBERCEPT 2 MG: 40 INJECTION, SOLUTION INTRAVITREAL at 11:08

## 2020-08-19 NOTE — PROGRESS NOTES
l    ===============================  Kelton Mcdaniels,  8/19/2020 today   89 y.o. male   Last visit [facility]: :6/22/2020   Last visit eye dept. 6/24/2020  [facility]:  Corrected distance visual acuity was 20/400 in the right eye and 20/100 in the left eye.   Not recorded         Not recorded         Not recorded         Not recorded        Chief Complaint   Patient presents with    Macular Degeneration     eylea OS       ________________  8/19/2020 today  HPI     Macular Degeneration      Additional comments: eylea OS              Comments     COPD               Vigamox Prep/NO BETADINE/no Bleb    --Smd/srn od dx. 10/5/15   History of avastin and eylea od, last 1/20/17  --2+ vac cat ou  New Srn os dx and treated with avastin 3/15/19   Last Eylea OS 6/22/20          Last edited by Megan Leong on 8/19/2020 10:29 AM. 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 New Srn os dx and treated with avastin 3/15/19, then eylea.          Relevant Medications    aflibercept Soln 2 mg    Other Relevant Orders    Posterior Segment OCT Retina-Both eyes (Completed)    Prior authorization Order    Exudative age-related macular degeneration of right eye with inactive scar    Relevant Orders    Posterior Segment OCT Retina-Both eyes (Completed)          ...  Cataract surgery os pending 8/27  Slight activity os today, go back to 6 weeks    Injection Procedure Note:    8/19/2020  Diagnosis :  srn os  Today:   Eylea (afibercept) 2 mg/0.05 ml Intravitreal Injection , OS   Follow up: 6 weeks eylea os    Instructed to call 24/7 for any worsening of vision. Check Both eyes daily. Gave patient my home phone number.  Risks, benefits, and alternatives to treatment discussed in detail with the patient.  The patient voiced understanding and  wished to proceed with the procedure.     Patient Identified and Time Out complete  Subconjunctival bleb - xylocaine with epi 2%   and VIGAMOX.  Inject at Eylea (afibercept) 2 mg/0.05 ml Intravitreal Injection , OS 6:00 @ 3.5-4mm posterior to limbus  1 stop: yes   Post Operative Dx: Same  Complications: None  Follow up as above.        ===========================

## 2020-09-10 ENCOUNTER — OUTSIDE PLACE OF SERVICE (OUTPATIENT)
Dept: ADMINISTRATIVE | Facility: OTHER | Age: 85
End: 2020-09-10
Payer: MEDICARE

## 2020-09-10 PROCEDURE — 66982 PR REMOVAL, CATARACT, W/INSRT INTRAOC LENS, W/O ENDO CYCLO, CMPLX: ICD-10-PCS | Mod: LT,,, | Performed by: OPHTHALMOLOGY

## 2020-09-10 PROCEDURE — 66982 XCAPSL CTRC RMVL CPLX WO ECP: CPT | Mod: LT,,, | Performed by: OPHTHALMOLOGY

## 2020-09-11 ENCOUNTER — OFFICE VISIT (OUTPATIENT)
Dept: OPHTHALMOLOGY | Facility: CLINIC | Age: 85
End: 2020-09-11
Payer: MEDICARE

## 2020-09-11 DIAGNOSIS — Z98.42 CATARACT EXTRACTION STATUS, LEFT: Primary | ICD-10-CM

## 2020-09-11 PROCEDURE — 99212 OFFICE O/P EST SF 10 MIN: CPT | Mod: PBBFAC | Performed by: OPHTHALMOLOGY

## 2020-09-11 PROCEDURE — 99999 PR PBB SHADOW E&M-EST. PATIENT-LVL II: ICD-10-PCS | Mod: PBBFAC,,, | Performed by: OPHTHALMOLOGY

## 2020-09-11 PROCEDURE — 99024 POSTOP FOLLOW-UP VISIT: CPT | Mod: POP,,, | Performed by: OPHTHALMOLOGY

## 2020-09-11 PROCEDURE — 99999 PR PBB SHADOW E&M-EST. PATIENT-LVL II: CPT | Mod: PBBFAC,,, | Performed by: OPHTHALMOLOGY

## 2020-09-11 PROCEDURE — 99024 PR POST-OP FOLLOW-UP VISIT: ICD-10-PCS | Mod: POP,,, | Performed by: OPHTHALMOLOGY

## 2020-09-11 NOTE — PROGRESS NOTES
HPI     Post-op Evaluation     Comments: 1 day PCIOL OS 9/10/20              Comments     Pt states no problems since surgery.     Vigamox Prep/NO BETADINE/no Bleb    --Smd/srn od dx. 10/5/15   History of avastin and eylea od, last 1/20/17  --2+ vac cat ou  New Srn os dx and treated with avastin 3/15/19   Last Eylea OS 4/30/20    PCIOL OS 9/10/20    Durezol and Keto QID OS          Last edited by Vinayak Bah on 9/11/2020  9:52 AM. (History)            Assessment /Plan     For exam results, see Encounter Report.      ICD-10-CM ICD-9-CM    1. Cataract extraction status, left  Z98.42 V45.61        PO Day 1 S/P Phaco/IOL  left eye  Doing well.    Use Durezol and Keto  QID    Reinstructed in importance of absolute compliance with Post-OP instructions including medications, shield at bedtime, and limitation of activities. Follow up appointments in approximately one and six weeks or call immediately for increased pain, redness or vision loss.

## 2020-09-16 ENCOUNTER — OFFICE VISIT (OUTPATIENT)
Dept: OPHTHALMOLOGY | Facility: CLINIC | Age: 85
End: 2020-09-16
Payer: MEDICARE

## 2020-09-16 DIAGNOSIS — H35.3213 EXUDATIVE AGE-RELATED MACULAR DEGENERATION OF RIGHT EYE WITH INACTIVE SCAR: ICD-10-CM

## 2020-09-16 DIAGNOSIS — H25.11 NUCLEAR SENILE CATARACT OF RIGHT EYE: ICD-10-CM

## 2020-09-16 DIAGNOSIS — Z98.42 CATARACT EXTRACTION STATUS, LEFT: Primary | ICD-10-CM

## 2020-09-16 PROCEDURE — 99024 PR POST-OP FOLLOW-UP VISIT: ICD-10-PCS | Mod: POP,,, | Performed by: OPHTHALMOLOGY

## 2020-09-16 PROCEDURE — 92136 IOL MASTER - OD - RIGHT EYE: ICD-10-PCS | Mod: 26,S$PBB,RT, | Performed by: OPHTHALMOLOGY

## 2020-09-16 PROCEDURE — 92136 OPHTHALMIC BIOMETRY: CPT | Mod: PBBFAC,RT | Performed by: OPHTHALMOLOGY

## 2020-09-16 PROCEDURE — 99999 PR PBB SHADOW E&M-EST. PATIENT-LVL II: CPT | Mod: PBBFAC,,, | Performed by: OPHTHALMOLOGY

## 2020-09-16 PROCEDURE — 99024 POSTOP FOLLOW-UP VISIT: CPT | Mod: POP,,, | Performed by: OPHTHALMOLOGY

## 2020-09-16 PROCEDURE — 99212 OFFICE O/P EST SF 10 MIN: CPT | Mod: PBBFAC,25 | Performed by: OPHTHALMOLOGY

## 2020-09-16 PROCEDURE — 99999 PR PBB SHADOW E&M-EST. PATIENT-LVL II: ICD-10-PCS | Mod: PBBFAC,,, | Performed by: OPHTHALMOLOGY

## 2020-09-16 NOTE — PROGRESS NOTES
HPI     S/p PCIOL OS 09/10/20.  No complaints, no eye pain    Vigamox Prep/NO BETADINE/no Bleb    --Smd/srn od dx. 10/5/15   History of avastin and eylea od, last 1/20/17  --2+ vac cat ou  New Srn os dx and treated with avastin 3/15/19   Last Eylea OS 4/30/20    PCIOL OS SN60WF +21.0 /CDE 29.66 9/10/20    Durezol and Keto QID OS      Last edited by Lauren Zuluaga on 9/16/2020  3:16 PM. (History)            Assessment /Plan     For exam results, see Encounter Report.      ICD-10-CM ICD-9-CM    1. Cataract extraction status, left  Z98.42 V45.61 S/p phaco OD doing well   Taper Durezol and Keto -   Pt also having injections with Dr Phillips    2. Nuclear senile cataract of right eye  H25.11 366.16 Ambulatory Referral to External Surgery      IOL Master - OD - Right Eye    Patient reports decreased vision in the fellow eye consistent with the clinical amount of lenticular opacity, which reaches the level of visual significance and affects activities of daily living including reading and glare. Risks, benefits, and alternatives to cataract surgery were discussed and pt desired to schedule cataract surgery. Pt was consented and the biometry and lens options were reviewed.    Phaco right + 22.0 WF  Block   Requests a monofocal  IOL.  Will aim for distance  Patient given prescriptions for Durezol and Keto   Explained that patient may need glasses after surgery.  Discussed that vision may be limited by retina/amd     May need clearance from [facility] prior phaco      3. Exudative age-related macular degeneration of right eye with inactive scar  H35.3213 362.52 Followed by Dr Phillips      363.30

## 2020-09-21 ENCOUNTER — OFFICE VISIT (OUTPATIENT)
Dept: INTERNAL MEDICINE | Facility: CLINIC | Age: 85
End: 2020-09-21
Payer: MEDICARE

## 2020-09-21 VITALS
TEMPERATURE: 98 F | OXYGEN SATURATION: 98 % | RESPIRATION RATE: 22 BRPM | HEART RATE: 83 BPM | DIASTOLIC BLOOD PRESSURE: 70 MMHG | SYSTOLIC BLOOD PRESSURE: 130 MMHG | WEIGHT: 166 LBS | HEIGHT: 67 IN | BODY MASS INDEX: 26.06 KG/M2

## 2020-09-21 DIAGNOSIS — R26.9 ABNORMALITY OF GAIT AND MOBILITY: ICD-10-CM

## 2020-09-21 DIAGNOSIS — Z99.81 HYPOXEMIA REQUIRING SUPPLEMENTAL OXYGEN: ICD-10-CM

## 2020-09-21 DIAGNOSIS — H35.3221 EXUDATIVE AGE-RELATED MACULAR DEGENERATION OF LEFT EYE WITH ACTIVE CHOROIDAL NEOVASCULARIZATION: ICD-10-CM

## 2020-09-21 DIAGNOSIS — M54.16 LUMBAR RADICULOPATHY: ICD-10-CM

## 2020-09-21 DIAGNOSIS — N40.0 BENIGN PROSTATIC HYPERPLASIA, UNSPECIFIED WHETHER LOWER URINARY TRACT SYMPTOMS PRESENT: ICD-10-CM

## 2020-09-21 DIAGNOSIS — F41.9 ANXIETY: ICD-10-CM

## 2020-09-21 DIAGNOSIS — K21.9 GASTROESOPHAGEAL REFLUX DISEASE, ESOPHAGITIS PRESENCE NOT SPECIFIED: ICD-10-CM

## 2020-09-21 DIAGNOSIS — H35.3213 EXUDATIVE AGE-RELATED MACULAR DEGENERATION OF RIGHT EYE WITH INACTIVE SCAR: ICD-10-CM

## 2020-09-21 DIAGNOSIS — I07.1 TRICUSPID VALVE INSUFFICIENCY, UNSPECIFIED ETIOLOGY: ICD-10-CM

## 2020-09-21 DIAGNOSIS — B02.29 POST HERPETIC NEURALGIA: ICD-10-CM

## 2020-09-21 DIAGNOSIS — H02.006 ENTROPION OF LEFT EYELID: ICD-10-CM

## 2020-09-21 DIAGNOSIS — Z99.81 DEPENDENCE ON SUPPLEMENTAL OXYGEN: ICD-10-CM

## 2020-09-21 DIAGNOSIS — J44.9 COPD, SEVERE: Chronic | ICD-10-CM

## 2020-09-21 DIAGNOSIS — R09.02 EXERCISE HYPOXEMIA: Chronic | ICD-10-CM

## 2020-09-21 DIAGNOSIS — I25.10 CAD IN NATIVE ARTERY: ICD-10-CM

## 2020-09-21 DIAGNOSIS — H04.129 DRYNESS, EYE: ICD-10-CM

## 2020-09-21 DIAGNOSIS — R09.02 HYPOXEMIA REQUIRING SUPPLEMENTAL OXYGEN: ICD-10-CM

## 2020-09-21 DIAGNOSIS — E78.2 MIXED HYPERLIPIDEMIA: ICD-10-CM

## 2020-09-21 DIAGNOSIS — Z00.00 ENCOUNTER FOR PREVENTIVE HEALTH EXAMINATION: Primary | ICD-10-CM

## 2020-09-21 DIAGNOSIS — Z99.89 DEPENDENCE ON OTHER ENABLING MACHINES AND DEVICES: ICD-10-CM

## 2020-09-21 DIAGNOSIS — I27.20 PULMONARY HYPERTENSION: ICD-10-CM

## 2020-09-21 DIAGNOSIS — I10 ESSENTIAL HYPERTENSION: ICD-10-CM

## 2020-09-21 PROCEDURE — 99999 PR PBB SHADOW E&M-EST. PATIENT-LVL III: ICD-10-PCS | Mod: PBBFAC,,, | Performed by: NURSE PRACTITIONER

## 2020-09-21 PROCEDURE — 90694 VACC AIIV4 NO PRSRV 0.5ML IM: CPT | Mod: PBBFAC,PO | Performed by: NURSE PRACTITIONER

## 2020-09-21 PROCEDURE — 99213 OFFICE O/P EST LOW 20 MIN: CPT | Mod: PBBFAC,PO | Performed by: NURSE PRACTITIONER

## 2020-09-21 PROCEDURE — G0439 PR MEDICARE ANNUAL WELLNESS SUBSEQUENT VISIT: ICD-10-PCS | Mod: S$GLB,,, | Performed by: NURSE PRACTITIONER

## 2020-09-21 PROCEDURE — G0008 ADMIN INFLUENZA VIRUS VAC: HCPCS | Mod: PBBFAC

## 2020-09-21 PROCEDURE — 99999 PR PBB SHADOW E&M-EST. PATIENT-LVL III: CPT | Mod: PBBFAC,,, | Performed by: NURSE PRACTITIONER

## 2020-09-21 PROCEDURE — G0439 PPPS, SUBSEQ VISIT: HCPCS | Mod: S$GLB,,, | Performed by: NURSE PRACTITIONER

## 2020-09-21 NOTE — PATIENT INSTRUCTIONS
Counseling and Referral of Other Preventative  (Italic type indicates deductible and co-insurance are waived)    Patient Name: Kelton Mcdaniels  Today's Date: 9/21/2020    Health Maintenance       Date Due Completion Date    TETANUS VACCINE 01/03/1946 ---    Aspirin/Antiplatelet Therapy 01/03/1946 ---    Shingles Vaccine (1 of 2) 01/03/1978 ---    Influenza Vaccine (1) 08/01/2020 9/20/2019    Lipid Panel 03/13/2021 3/13/2020        Orders Placed This Encounter   Procedures    Influenza - Quadrivalent (Adjuvanted)     The following information is provided to all patients.  This information is to help you find resources for any of the problems found today that may be affecting your health:                Living healthy guide: www.[facility].[place].gov      Understanding Diabetes: www.diabetes.org      Eating healthy: www.cdc.gov/healthyweight      [facility] home safety checklist: www.cdc.gov/steadi/patient.html      Agency on Aging: www.goea.[place].[facility]      Alcoholics anonymous (AA): www.aa.org      Physical Activity: www.eli.nih.gov/iq3yllm      Tobacco use: www.quitwithusla.org

## 2020-09-21 NOTE — PROGRESS NOTES
"Kelton Mcdaniels presented for a  Medicare AWV and comprehensive Health Risk Assessment today. The following components were reviewed and updated:    · Medical history  · Family History  · Social history  · Allergies and Current Medications  · Health Risk Assessment  · Health Maintenance  · Care Team     ** See Completed Assessments for Annual Wellness Visit within the encounter summary.**         The following assessments were completed:  · Living Situation  · CAGE  · Depression Screening  · Timed Get Up and Go  · Whisper Test  · Cognitive Function Screening  · Nutrition Screening  · ADL Screening  · PAQ Screening        Vitals:    09/21/20 1058   BP: 130/70   BP Location: Right arm   Patient Position: Sitting   BP Method: Medium (Manual)   Pulse: 83   Resp: (!) 22   Temp: 97.7 °F (36.5 °C)   TempSrc: Temporal   SpO2: 98%   Weight: 75.3 kg (166 lb 0.1 oz)   Height: 5' 7" (1.702 m)     Body mass index is 26 kg/m².  Physical Exam  Constitutional:       General: He is not in acute distress.     Appearance: Normal appearance. He is well-developed. He is not toxic-appearing or diaphoretic.   HENT:      Head: Normocephalic and atraumatic.      Right Ear: External ear normal.      Left Ear: External ear normal.      Mouth/Throat:      Mouth: Mucous membranes are moist.      Pharynx: No posterior oropharyngeal erythema.   Eyes:      Conjunctiva/sclera: Conjunctivae normal.   Neck:      Musculoskeletal: Normal range of motion and neck supple. No neck rigidity or muscular tenderness.      Vascular: No carotid bruit.   Cardiovascular:      Rate and Rhythm: Normal rate and regular rhythm.      Heart sounds: Normal heart sounds. No murmur. No friction rub. No gallop.    Pulmonary:      Effort: Pulmonary effort is normal. No respiratory distress.      Breath sounds: No wheezing or rales.      Comments: Coarse, distant sounds, portable oxygen 2 l NC  Chest:      Chest wall: No tenderness.   Abdominal:      General: Bowel sounds are " normal. There is no distension.      Palpations: There is no mass.      Tenderness: There is no abdominal tenderness.      Hernia: No hernia is present.   Musculoskeletal: Normal range of motion.         General: No tenderness.      Comments: Uses rolling walker, wife present   Lymphadenopathy:      Cervical: No cervical adenopathy.   Skin:     General: Skin is warm and dry.   Neurological:      General: No focal deficit present.      Mental Status: He is alert and oriented to person, place, and time.      Cranial Nerves: No cranial nerve deficit.   Psychiatric:         Mood and Affect: Mood normal.         Behavior: Behavior normal.               Diagnoses and health risks identified today and associated recommendations/orders:    1. Dependence on other enabling machines and devices  Monitored/treated on meds, continue the same tx, stable    2. Dependence on supplemental oxygen  Monitored/treated on meds, continue the same tx, stable    3. Abnormality of gait and mobility  Monitored/treated on meds, continue the same tx, stable, rolling walker    4. Encounter for preventive health examination  Screenings performed, as noted above.  Personal preventative testing needs reviewed.     5. Post herpetic neuralgia  Monitored/treated on meds, continue the same tx, stable    6. Lumbar radiculopathy  Monitored/treated on meds, continue the same tx, stable    7. Anxiety  Monitored/treated on meds, continue the same tx, stable    8. Exudative age-related macular degeneration of right eye with inactive scar  Monitored/treated on meds, continue the same tx, stable, sees Dr Ruelas    9. Exudative age-related macular degeneration of left eye with active choroidal neovascularization  Monitored/treated on meds, continue the same tx, stable, sees Dr Ruelas    10. Entropion of left eyelid  Monitored/treated on meds, continue the same tx, stable    11. Dryness, eye  Monitored/treated on meds, continue the same tx, stable    12.  Pulmonary hypertension  Monitored/treated on meds, continue the same tx, stable, followed by pulmonary    13. Hypoxemia requiring supplemental oxygen  Monitored/treated on meds, continue the same tx, stable    14. Exercise hypoxemia  Monitored/treated on meds, continue the same tx, stable, states does sitting exercises daily    15. COPD, severe  Monitored/treated on meds, continue the same tx, stable, home oxygen    16. Tricuspid valve insufficiency, unspecified etiology  Monitored/treated on meds, continue the same tx, stable    17. Essential hypertension  Monitored/treated on meds, continue the same tx, stable    18. Mixed hyperlipidemia  Monitored/treated on meds, continue the same tx, stable    19. CAD in native artery  Monitored/treated on meds, continue the same tx, stable    20. Benign prostatic hyperplasia, unspecified whether lower urinary tract symptoms present  Monitored/treated on meds, continue the same tx, stable    21. Gastroesophageal reflux disease, esophagitis presence not specified  Monitored/treated on meds, continue the same tx, stable      Provided Kelton with a 5-10 year written screening schedule and personal prevention plan. Recommendations were developed using the USPSTF age appropriate recommendations. Education, counseling, and referrals were provided as needed. After Visit Summary printed and given to patient which includes a list of additional screenings\tests needed.    No follow-ups on file.    Roman Meier NP  I offered to discuss end of life issues, including information on how to make advance directives that the patient could use to name someone who would make medical decisions on their behalf if they became too ill to make themselves.    _X_Patient declined  ___Patient is interested, I provided paper work and offered to discuss.

## 2020-09-30 ENCOUNTER — PROCEDURE VISIT (OUTPATIENT)
Dept: OPHTHALMOLOGY | Facility: CLINIC | Age: 85
End: 2020-09-30
Payer: MEDICARE

## 2020-09-30 DIAGNOSIS — H35.3213 EXUDATIVE AGE-RELATED MACULAR DEGENERATION OF RIGHT EYE WITH INACTIVE SCAR: ICD-10-CM

## 2020-09-30 DIAGNOSIS — H35.3221 EXUDATIVE AGE-RELATED MACULAR DEGENERATION OF LEFT EYE WITH ACTIVE CHOROIDAL NEOVASCULARIZATION: Primary | ICD-10-CM

## 2020-09-30 PROCEDURE — 99499 NO LOS: ICD-10-PCS | Mod: S$PBB,,, | Performed by: OPHTHALMOLOGY

## 2020-09-30 PROCEDURE — 92134 POSTERIOR SEGMENT OCT RETINA (OCULAR COHERENCE TOMOGRAPHY)-BOTH EYES: ICD-10-PCS | Mod: 26,S$PBB,, | Performed by: OPHTHALMOLOGY

## 2020-09-30 PROCEDURE — 92134 CPTRZ OPH DX IMG PST SGM RTA: CPT | Mod: PBBFAC | Performed by: OPHTHALMOLOGY

## 2020-09-30 PROCEDURE — 67028 PR INJECT INTRAVITREAL PHARMCOLOGIC: ICD-10-PCS | Mod: S$PBB,LT,, | Performed by: OPHTHALMOLOGY

## 2020-09-30 PROCEDURE — 67028 INJECTION EYE DRUG: CPT | Mod: PBBFAC,LT | Performed by: OPHTHALMOLOGY

## 2020-09-30 PROCEDURE — 67028 INJECTION EYE DRUG: CPT | Mod: S$PBB,LT,, | Performed by: OPHTHALMOLOGY

## 2020-09-30 PROCEDURE — 99499 UNLISTED E&M SERVICE: CPT | Mod: S$PBB,,, | Performed by: OPHTHALMOLOGY

## 2020-09-30 RX ADMIN — AFLIBERCEPT 2 MG: 40 INJECTION, SOLUTION INTRAVITREAL at 11:09

## 2020-09-30 NOTE — PROGRESS NOTES
===============================  Kelton Mcdaniels,  9/30/2020 today   89 y.o. male   Last visit [facility]: :8/19/2020   Last visit eye dept. 9/16/2020  [facility]:  Corrected distance visual acuity was 20/400 in the right eye and 20/200 in the left eye.   Not recorded         Not recorded         Not recorded         Not recorded        Chief Complaint   Patient presents with    Macular Degeneration     eylea os       ________________  9/30/2020 today  HPI     Macular Degeneration      Additional comments: eylea os              Comments     S/p PCIOL OS 09/10/20.  No complaints, no eye pain    Vigamox Prep/NO BETADINE/no Bleb    --Smd/srn od dx. 10/5/15   History of avastin and eylea od, last 1/20/17  --2+ vac cat ou  New Srn os dx and treated with avastin 3/15/19   Last Eylea OS 8/19/20  PCIOL OS SN60WF +21.0 /CDE 29.66 9/10/20    Durezol and Keto QID OS            Last edited by Megan Leong on 9/30/2020 10:36 AM. 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 New Srn os dx and treated with avastin 3/15/19, then eylea.          Relevant Medications    aflibercept Soln 2 mg (Completed)    Other Relevant Orders    Posterior Segment OCT Retina-Both eyes    Prior authorization Order    Exudative age-related macular degeneration of right eye with inactive scar        Recent os CE    Oct stabel ou  fibrots od = stbel os no IG compenent   Have been maitinng os at 8 weesk recemnt;ly wors back to 6  4+ spk  Dens k medicamntos   off drops ats qid or more  rtc  1 mo - do os  And the back to 6 weeks   evdanny hernadez of cornea; therapy    ...    Injection Procedure Note:  vigamox prep    9/30/2020  Diagnosis :  Os srn  Today:   Eylea (afibercept) 2 mg/0.05 ml Intravitreal Injection , OS   Follow up: 1 mo    Instructed to call  24/7 for any worsening of vision. Check Both eyes daily. Gave patient my home phone number.  Risks, benefits, and alternatives to treatment discussed in detail with the patient.  The patient voiced understanding and wished to proceed with the procedure.     Patient Identified and Time Out complete  Subconjunctival bleb - xylocaine with epi 2%   and Betadine.  Inject at Eylea (afibercept) 2 mg/0.05 ml Intravitreal Injection , OS 6:00 @ 3.5-4mm posterior to limbus  1 stop: na   Post Operative Dx: Same  Complications: None  Follow up as above.        ===========================

## 2020-10-07 ENCOUNTER — PATIENT MESSAGE (OUTPATIENT)
Dept: OTOLARYNGOLOGY | Facility: CLINIC | Age: 85
End: 2020-10-07

## 2020-10-07 ENCOUNTER — PATIENT MESSAGE (OUTPATIENT)
Dept: INTERNAL MEDICINE | Facility: CLINIC | Age: 85
End: 2020-10-07

## 2020-10-07 DIAGNOSIS — I25.10 CAD IN NATIVE ARTERY: ICD-10-CM

## 2020-10-07 RX ORDER — AZELASTINE 1 MG/ML
1 SPRAY, METERED NASAL 2 TIMES DAILY
Qty: 30 ML | Refills: 12 | Status: SHIPPED | OUTPATIENT
Start: 2020-10-07 | End: 2021-10-07

## 2020-10-08 ENCOUNTER — OUTSIDE PLACE OF SERVICE (OUTPATIENT)
Dept: ADMINISTRATIVE | Facility: OTHER | Age: 85
End: 2020-10-08
Payer: MEDICARE

## 2020-10-08 ENCOUNTER — OFFICE VISIT (OUTPATIENT)
Dept: OPHTHALMOLOGY | Facility: CLINIC | Age: 85
End: 2020-10-08
Payer: MEDICARE

## 2020-10-08 DIAGNOSIS — Z98.42 CATARACT EXTRACTION STATUS, LEFT: ICD-10-CM

## 2020-10-08 DIAGNOSIS — Z98.41 CATARACT EXTRACTION STATUS, RIGHT: Primary | ICD-10-CM

## 2020-10-08 PROCEDURE — 66984 XCAPSL CTRC RMVL W/O ECP: CPT | Mod: 79,RT,, | Performed by: OPHTHALMOLOGY

## 2020-10-08 PROCEDURE — 99999 PR PBB SHADOW E&M-EST. PATIENT-LVL III: ICD-10-PCS | Mod: PBBFAC,,, | Performed by: OPHTHALMOLOGY

## 2020-10-08 PROCEDURE — 99213 OFFICE O/P EST LOW 20 MIN: CPT | Mod: PBBFAC | Performed by: OPHTHALMOLOGY

## 2020-10-08 PROCEDURE — 99024 POSTOP FOLLOW-UP VISIT: CPT | Mod: POP,,, | Performed by: OPHTHALMOLOGY

## 2020-10-08 PROCEDURE — 99024 PR POST-OP FOLLOW-UP VISIT: ICD-10-PCS | Mod: POP,,, | Performed by: OPHTHALMOLOGY

## 2020-10-08 PROCEDURE — 99999 PR PBB SHADOW E&M-EST. PATIENT-LVL III: CPT | Mod: PBBFAC,,, | Performed by: OPHTHALMOLOGY

## 2020-10-08 PROCEDURE — 66984 PR REMOVAL, CATARACT, W/INSRT INTRAOC LENS, W/O ENDO CYCLO: ICD-10-PCS | Mod: 79,RT,, | Performed by: OPHTHALMOLOGY

## 2020-10-08 RX ORDER — CARVEDILOL 25 MG/1
25 TABLET ORAL 2 TIMES DAILY
Qty: 60 TABLET | Refills: 11 | Status: SHIPPED | OUTPATIENT
Start: 2020-10-08

## 2020-10-08 NOTE — PROGRESS NOTES
HPI     Post-op Evaluation     Comments: S/P Phaco with IOL OD (10/08/20)              Comments     PT C/O: [facility] is still blurry in OD but denies any pain or irritation.      Vigamox Prep/NO BETADINE/no Bleb    --Smd/srn od dx. 10/5/15   History of avastin and eylea od, last 1/20/17  --2+ vac cat ou  New Srn os dx and treated with avastin 3/15/19   Last Eylea OS 8/19/20    PCIOL OS SN60WF +21.0 /CDE 29.66 9/10/20    Durezol and Keto QD OS  Durezol and Keto QID OD      WAS ON THE Quick Key          Last edited by Aimee Marion, Patient Care Assistant on 10/8/2020  3:33   PM. (History)            Assessment /Plan     For exam results, see Encounter Report.      ICD-10-CM ICD-9-CM    1. Cataract extraction status, right  Z98.41 V45.61    2. Cataract extraction status, left  Z98.42 V45.61        PO Day 1 S/P Phaco/IOL  right eye  Doing well.    Use Durezol and Keto QID    Reinstructed in importance of absolute compliance with Post-OP instructions including medications, shield at bedtime, and limitation of activities. Follow up appointments in approximately one and six weeks or call immediately for increased pain, redness or vision loss.

## 2020-10-19 ENCOUNTER — OFFICE VISIT (OUTPATIENT)
Dept: OPHTHALMOLOGY | Facility: CLINIC | Age: 85
End: 2020-10-19
Payer: MEDICARE

## 2020-10-19 DIAGNOSIS — Z98.41 CATARACT EXTRACTION STATUS, RIGHT: Primary | ICD-10-CM

## 2020-10-19 DIAGNOSIS — Z98.42 CATARACT EXTRACTION STATUS, LEFT: ICD-10-CM

## 2020-10-19 DIAGNOSIS — H35.3221 EXUDATIVE AGE-RELATED MACULAR DEGENERATION OF LEFT EYE WITH ACTIVE CHOROIDAL NEOVASCULARIZATION: ICD-10-CM

## 2020-10-19 DIAGNOSIS — H35.3213 EXUDATIVE AGE-RELATED MACULAR DEGENERATION OF RIGHT EYE WITH INACTIVE SCAR: ICD-10-CM

## 2020-10-19 PROCEDURE — 99213 OFFICE O/P EST LOW 20 MIN: CPT | Mod: PBBFAC | Performed by: OPHTHALMOLOGY

## 2020-10-19 PROCEDURE — 99024 PR POST-OP FOLLOW-UP VISIT: ICD-10-PCS | Mod: POP,,, | Performed by: OPHTHALMOLOGY

## 2020-10-19 PROCEDURE — 99024 POSTOP FOLLOW-UP VISIT: CPT | Mod: POP,,, | Performed by: OPHTHALMOLOGY

## 2020-10-19 PROCEDURE — 99999 PR PBB SHADOW E&M-EST. PATIENT-LVL III: CPT | Mod: PBBFAC,,, | Performed by: OPHTHALMOLOGY

## 2020-10-19 PROCEDURE — 99999 PR PBB SHADOW E&M-EST. PATIENT-LVL III: ICD-10-PCS | Mod: PBBFAC,,, | Performed by: OPHTHALMOLOGY

## 2020-10-19 RX ORDER — KETOROLAC TROMETHAMINE 5 MG/ML
SOLUTION OPHTHALMIC
COMMUNITY
Start: 2020-10-09

## 2020-10-19 RX ORDER — DUREZOL 0.5 MG/ML
EMULSION OPHTHALMIC
COMMUNITY
Start: 2020-10-11

## 2020-10-19 NOTE — PROGRESS NOTES
HPI     Post-op Evaluation     Comments: S/P Phaco with IOL OD (10/08/20)              Comments     Patient states OD is doing well, denies any pain or irritation and happy   with surgical results, using drops as directed.      Vigamox Prep/NO BETADINE/no Bleb    --Smd/srn od dx. 10/5/15   History of avastin and eylea od, last 1/20/17  --2+ vac cat ou  New Srn os dx and treated with avastin 3/15/19   Last Eylea OS 8/19/20    PCIOL OS SN60WF +21.0 /CDE 29.66 9/10/20  PCIOL OD +22.0 SN60WF/CDE: 20.61 10/08/20      Durezol and Keto QID OD            Last edited by Aimee Marion, Patient Care Assistant on 10/19/2020 10:25   AM. (History)            Assessment /Plan     For exam results, see Encounter Report.      ICD-10-CM ICD-9-CM    1. Cataract extraction status, right  Z98.41 V45.61    2. Cataract extraction status, left  Z98.42 V45.61    3. Exudative age-related macular degeneration of left eye with active choroidal neovascularization  H35.3221 362.52      362.16    4. Exudative age-related macular degeneration of right eye with inactive scar  H35.3213 362.52      363.30      Recommend readers  Doing well post Phaco OU  vision limited by AMD  Taper durezol and keto OD over three weeks  Return to clinic with [facility] for AMD treatments

## 2020-11-02 ENCOUNTER — PATIENT MESSAGE (OUTPATIENT)
Dept: PHYSICAL MEDICINE AND REHAB | Facility: CLINIC | Age: 85
End: 2020-11-02

## 2020-11-02 ENCOUNTER — PATIENT OUTREACH (OUTPATIENT)
Dept: ADMINISTRATIVE | Facility: OTHER | Age: 85
End: 2020-11-02

## 2020-11-03 ENCOUNTER — PATIENT MESSAGE (OUTPATIENT)
Dept: PHYSICAL MEDICINE AND REHAB | Facility: CLINIC | Age: 85
End: 2020-11-03

## 2020-11-04 ENCOUNTER — PROCEDURE VISIT (OUTPATIENT)
Dept: OPHTHALMOLOGY | Facility: CLINIC | Age: 85
End: 2020-11-04
Payer: MEDICARE

## 2020-11-04 DIAGNOSIS — M25.512 LEFT SHOULDER PAIN, UNSPECIFIED CHRONICITY: Primary | ICD-10-CM

## 2020-11-04 DIAGNOSIS — H35.3221 EXUDATIVE AGE-RELATED MACULAR DEGENERATION OF LEFT EYE WITH ACTIVE CHOROIDAL NEOVASCULARIZATION: Primary | ICD-10-CM

## 2020-11-04 PROCEDURE — 67028 INJECTION EYE DRUG: CPT | Mod: S$PBB,79,LT, | Performed by: OPHTHALMOLOGY

## 2020-11-04 PROCEDURE — 92134 POSTERIOR SEGMENT OCT RETINA (OCULAR COHERENCE TOMOGRAPHY)-BOTH EYES: ICD-10-PCS | Mod: 26,S$PBB,, | Performed by: OPHTHALMOLOGY

## 2020-11-04 PROCEDURE — 99499 UNLISTED E&M SERVICE: CPT | Mod: S$PBB,,, | Performed by: OPHTHALMOLOGY

## 2020-11-04 PROCEDURE — 99499 NO LOS: ICD-10-PCS | Mod: S$PBB,,, | Performed by: OPHTHALMOLOGY

## 2020-11-04 PROCEDURE — 67028 INJECTION EYE DRUG: CPT | Mod: PBBFAC,LT | Performed by: OPHTHALMOLOGY

## 2020-11-04 PROCEDURE — 67028 PR INJECT INTRAVITREAL PHARMCOLOGIC: ICD-10-PCS | Mod: S$PBB,79,LT, | Performed by: OPHTHALMOLOGY

## 2020-11-04 PROCEDURE — 92134 CPTRZ OPH DX IMG PST SGM RTA: CPT | Mod: PBBFAC | Performed by: OPHTHALMOLOGY

## 2020-11-04 RX ADMIN — AFLIBERCEPT 2 MG: 40 INJECTION, SOLUTION INTRAVITREAL at 09:11

## 2020-11-04 NOTE — PROGRESS NOTES
===============================  Kelton Mcdaniels,  11/4/2020 today   89 y.o. male   Last visit [facility]: :9/30/2020   Last visit eye dept. 10/8/2020  [facility]:  Corrected distance visual acuity was 20/400 in the right eye and 20/200 in the left eye.  Tonometry     Tonometry (Applanation, 9:51 AM)       Right Left    Pressure 15 17               Not recorded         Not recorded        CYCLOPLEGIC     Cycloplegic Refraction       Sphere Dist [facility]    Right Bath     Left Bath 20/200              Chief Complaint   Patient presents with    srn     patient here for eylea OS      Ophthalmic Medications     Ophthalmic - Anti-inflammatory, Glucocorticoids Start End     DUREZOL 0.05 % Drop ophthalmic solution    10/11/2020     Sig: INSTILL 1 DROP INTO LEFT EYE 4 TIMES DAILY    Class: Historical Med    Ophthalmic - Anti-inflammatory, NSAIDs Start End     ketorolac 0.5% (ACULAR) 0.5 % Drop    10/9/2020     Sig: INSTILL 1 DROP INTO LEFT EYE 4 TIMES DAILY    Class: Historical Med        ________________  11/4/2020 today  HPI     srn      Additional comments: patient here for eylea OS               Comments     Patient denies any pain/irritation since last visit  Reports vision as stable     Vigamox Prep/NO BETADINE/no Bleb    --Smd/srn od dx. 10/5/15   History of avastin and eylea od, last 1/20/17  --2+ vac cat ou  New Srn os dx and treated with avastin 3/15/19   Last Eylea OS 09/30/2020    PCIOL OS SN60WF +21.0 /CDE 29.66 9/10/20  PCIOL OD +22.0 SN60WF/CDE: 20.61 10/08/20      Durezol and Keto QID OD            Last edited by Oswaldo Mcclure on 11/4/2020  9:12 AM. 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 New Srn os dx and treated with avastin 3/15/19, then eylea.          Relevant Medications    aflibercept Soln 2 mg  (Completed)    Other Relevant Orders    Posterior Segment OCT Retina-Both eyes (Completed)    Prior authorization Order        Mr today  20/30  Oct grsart   Prev fauilat c89...  No help with MR hebert, plano    Injection Procedure Note:    11/4/2020  Diagnosis :  Os srn   Today:   Eylea (afibercept) 2 mg/0.05 ml Intravitreal Injection , OS   Follow up: 6 weeks marah at 6     Instructed to call 24/7 for any worsening of vision. Check Both eyes daily. Gave patient my home phone number.  Risks, benefits, and alternatives to treatment discussed in detail with the patient.  The patient voiced understanding and wished to proceed with the procedure.     Patient Identified and Time Out complete  Subconjunctival bleb - xylocaine with epi 2%   and Betadine.  Inject at Eylea (afibercept) 2 mg/0.05 ml Intravitreal Injection , OS 6:00 @ 3.5-4mm posterior to limbus  1 stop: na   Post Operative Dx: Same  Complications: None  Follow up as above.        ===========================

## 2020-11-05 ENCOUNTER — OFFICE VISIT (OUTPATIENT)
Dept: ORTHOPEDICS | Facility: CLINIC | Age: 85
End: 2020-11-05
Payer: MEDICARE

## 2020-11-05 ENCOUNTER — HOSPITAL ENCOUNTER (OUTPATIENT)
Dept: RADIOLOGY | Facility: HOSPITAL | Age: 85
Discharge: HOME | End: 2020-11-05
Attending: PHYSICIAN ASSISTANT
Payer: MEDICARE

## 2020-11-05 VITALS
BODY MASS INDEX: 26.06 KG/M2 | HEIGHT: 67 IN | SYSTOLIC BLOOD PRESSURE: 128 MMHG | DIASTOLIC BLOOD PRESSURE: 71 MMHG | HEART RATE: 80 BPM | WEIGHT: 166 LBS

## 2020-11-05 DIAGNOSIS — M25.512 LEFT SHOULDER PAIN, UNSPECIFIED CHRONICITY: ICD-10-CM

## 2020-11-05 DIAGNOSIS — M75.22 BICEPS TENDINITIS OF LEFT SHOULDER: ICD-10-CM

## 2020-11-05 DIAGNOSIS — M25.512 LEFT SHOULDER PAIN, UNSPECIFIED CHRONICITY: Primary | ICD-10-CM

## 2020-11-05 DIAGNOSIS — M75.42 IMPINGEMENT SYNDROME OF LEFT SHOULDER: ICD-10-CM

## 2020-11-05 PROCEDURE — 20610 DRAIN/INJ JOINT/BURSA W/O US: CPT | Mod: S$PBB,LT,, | Performed by: PHYSICIAN ASSISTANT

## 2020-11-05 PROCEDURE — 99214 OFFICE O/P EST MOD 30 MIN: CPT | Mod: PBBFAC,25 | Performed by: PHYSICIAN ASSISTANT

## 2020-11-05 PROCEDURE — 73030 XR SHOULDER COMPLETE 2 OR MORE VIEWS LEFT: ICD-10-PCS | Mod: 26,LT,, | Performed by: RADIOLOGY

## 2020-11-05 PROCEDURE — 73030 X-RAY EXAM OF SHOULDER: CPT | Mod: TC,LT

## 2020-11-05 PROCEDURE — 20610 LARGE JOINT ASPIRATION/INJECTION: L SUBACROMIAL BURSA: ICD-10-PCS | Mod: S$PBB,LT,, | Performed by: PHYSICIAN ASSISTANT

## 2020-11-05 PROCEDURE — 20610 DRAIN/INJ JOINT/BURSA W/O US: CPT | Mod: PBBFAC | Performed by: PHYSICIAN ASSISTANT

## 2020-11-05 PROCEDURE — 73030 X-RAY EXAM OF SHOULDER: CPT | Mod: 26,LT,, | Performed by: RADIOLOGY

## 2020-11-05 PROCEDURE — 99203 PR OFFICE/OUTPT VISIT, NEW, LEVL III, 30-44 MIN: ICD-10-PCS | Mod: 25,S$PBB,, | Performed by: PHYSICIAN ASSISTANT

## 2020-11-05 PROCEDURE — 99203 OFFICE O/P NEW LOW 30 MIN: CPT | Mod: 25,S$PBB,, | Performed by: PHYSICIAN ASSISTANT

## 2020-11-05 PROCEDURE — 99999 PR PBB SHADOW E&M-EST. PATIENT-LVL IV: CPT | Mod: PBBFAC,,, | Performed by: PHYSICIAN ASSISTANT

## 2020-11-05 PROCEDURE — 99999 PR PBB SHADOW E&M-EST. PATIENT-LVL IV: ICD-10-PCS | Mod: PBBFAC,,, | Performed by: PHYSICIAN ASSISTANT

## 2020-11-05 RX ORDER — METHYLPREDNISOLONE ACETATE 80 MG/ML
80 INJECTION, SUSPENSION INTRA-ARTICULAR; INTRALESIONAL; INTRAMUSCULAR; SOFT TISSUE
Status: DISCONTINUED | OUTPATIENT
Start: 2020-11-05 | End: 2020-11-05 | Stop reason: HOSPADM

## 2020-11-05 RX ADMIN — METHYLPREDNISOLONE ACETATE 80 MG: 80 INJECTION, SUSPENSION INTRALESIONAL; INTRAMUSCULAR; INTRASYNOVIAL; SOFT TISSUE at 08:11

## 2020-11-05 NOTE — PROCEDURES
Large Joint Aspiration/Injection: L subacromial bursa    Date/Time: 11/5/2020 8:40 AM  Performed by: Megan Obregon PA-C  Authorized by: Megan Obregon PA-C     Consent Done?:  Yes (Verbal)  Indications:  Pain  Site marked: the procedure site was marked    Timeout: prior to procedure the correct patient, procedure, and site was verified    Prep: patient was prepped and draped in usual sterile fashion      Local anesthesia used?: Yes    Local anesthetic:  Lidocaine 1% without epinephrine  Anesthetic total (ml):  2      Details:  Needle Size:  22 G  Ultrasonic Guidance for needle placement?: No    Approach:  Posterior  Location:  Shoulder  Site:  L subacromial bursa  Medications:  80 mg methylPREDNISolone acetate 80 mg/mL  Patient tolerance:  Patient tolerated the procedure well with no immediate complications     After verbal consent was obtained for left shoulder injection, patient ID, site, and side were verified.  The left shoulder was sterilly prepped in the standard fashion.  A 22-gauge needle was introduced into left subacromial space from the posterior portal approach without complication. The left shoulder was then injected with 20 mg lidocaine plain and 80 mg depomedrol.  A sterile bandaid was applied.  The patient was informed to apply an ice pack approximately 10min once arriving home and not to do anything strenuous for 24hours. He was instructed to call if there were any problems.   Elevation of glucose in diabetic patients was discussed.  The patient was discharged in stable condition.

## 2020-11-05 NOTE — PROGRESS NOTES
Subjective:      Patient ID: Kelton Mcdaniels is a 89 y.o. male.    Chief Complaint: Pain of the Left Shoulder      HPI: Kelton Mcdaniels  is a 89 y.o. male who c/o Pain of the Left Shoulder   for duration of about a year now.  He denies any inciting injury.  He says pain initially started in the left shoulder region when he had shingles in September of 2019.  They initially diagnosed him with neuropathic pain related to shingles.  After about a year he still did improved.  He went to physical therapy and has been that recently over the last 2 months.  He has not seen improvement so the physiatrist recommended he see Orthopedics.  Pain level is 10/10.  It is worsened with range of motion as well as sleeping on that side.  Nothing makes it better.  Quality is aching throbbing sharp burning and shooting.  It    Past Medical History:   Diagnosis Date    BPH (benign prostatic hyperplasia)     Cataract     Chronic airway obstruction, not elsewhere classified     Coronary atherosclerosis of unspecified type of vessel, native or graft     Esophageal reflux     Essential hypertension     History of bladder cancer     History of colon cancer     Hyperlipidemia LDL goal <70     Impaired fasting glucose     Macular degeneration     Post herpetic neuralgia     Pulmonary hypertension 12/18/2015    TR (tricuspid regurgitation) 12/18/2015     Past Surgical History:   Procedure Laterality Date    BACK SURGERY      COLON SURGERY      CORONARY ARTERY BYPASS GRAFT      partial coloectomy for large polyp      turbt       Family History   Problem Relation Age of Onset    Heart failure Mother     Diabetes Father     Cancer Brother     Asthma Paternal Uncle      Social History     Socioeconomic History    Marital status:      Spouse name: Not on file    Number of children: Not on file    Years of education: Not on file    Highest education level: Not on file   Occupational History    Not on file   Social  Needs    Financial resource strain: Not hard at all    Food insecurity     Worry: Never true     Inability: Never true    Transportation needs     Medical: No     Non-medical: No   Tobacco Use    Smoking status: Former Smoker     Packs/day: 2.00     Years: 70.00     Pack years: 140.00     Types: Cigarettes    Smokeless tobacco: Never Used   Substance and Sexual Activity    Alcohol use: No     Frequency: 2-3 times a week     Drinks per session: 1 or 2     Binge frequency: Never    Drug use: No    Sexual activity: Never   Lifestyle    Physical activity     Days per week: 5 days     Minutes per session: Not on file    Stress: To some extent   Relationships    Social connections     Talks on phone: More than three times a week     Gets together: Once a week     Attends Mandaen service: Not on file     Active member of club or organization: No     Attends meetings of clubs or organizations: Never     Relationship status:    Other Topics Concern    Not on file   Social History Narrative    Not on file     Medication List with Changes/Refills   Current Medications    ADVAIR DISKUS 250-50 MCG/DOSE DISKUS INHALER    INHALE 1 PUFF BY MOUTH TWICE DAILY    ALBUTEROL (PROVENTIL/VENTOLIN HFA) 90 MCG/ACTUATION INHALER    Inhale 2 puffs into the lungs every 4 (four) hours as needed. Rescue    ALBUTEROL-IPRATROPIUM (DUO-NEB) 2.5 MG-0.5 MG/3 ML NEBULIZER SOLUTION    Take 3 mLs by nebulization every 6 (six) hours while awake. DX: J44.9    AZELASTINE (ASTELIN) 137 MCG (0.1 %) NASAL SPRAY    1 spray (137 mcg total) by Nasal route 2 (two) times daily.    CARVEDILOL (COREG) 25 MG TABLET    Take 1 tablet (25 mg total) by mouth 2 (two) times daily.    DUREZOL 0.05 % DROP OPHTHALMIC SOLUTION    INSTILL 1 DROP INTO LEFT EYE 4 TIMES DAILY    FINASTERIDE (PROSCAR) 5 MG TABLET    Take 1 tablet (5 mg total) by mouth once daily.    HYDROCHLOROTHIAZIDE (HYDRODIURIL) 25 MG TABLET    Take 1 tablet (25 mg total) by mouth once  daily.    HYOSCYAMINE (ANASPAZ,LEVSIN) 0.125 MG TAB    Take 0.125 mg by mouth every 4 (four) hours as needed.    KETOROLAC 0.5% (ACULAR) 0.5 % DROP    INSTILL 1 DROP INTO LEFT EYE 4 TIMES DAILY    LISINOPRIL (PRINIVIL,ZESTRIL) 40 MG TABLET    Take 40 mg by mouth.    MUCUS CLEARING DEVICE (ACAPELLA) MIROSLAVA    1 Device by Misc.(Non-Drug; Combo Route) route every 4 to 6 hours as needed.    NITROGLYCERIN (NITROSTAT) 0.4 MG SL TABLET    Place 1 tablet (0.4 mg total) under the tongue every 5 (five) minutes as needed for Chest pain.    OMEPRAZOLE (PRILOSEC) 20 MG CAPSULE    Take 1 capsule (20 mg total) by mouth once daily.    POTASSIUM CHLORIDE SA (KLOR-CON M20) 20 MEQ TABLET    Take 1 tablet (20 mEq total) by mouth 2 (two) times daily.    ROFLUMILAST (DALIRESP) 500 MCG TAB    Take 1 tablet (500 mcg total) by mouth once daily.    SIMVASTATIN (ZOCOR) 40 MG TABLET    Take 1 tablet (40 mg total) by mouth every evening.    TAMSULOSIN (FLOMAX) 0.4 MG CAP    Take 1 capsule (0.4 mg total) by mouth once daily.    TIOTROPIUM BROMIDE (SPIRIVA RESPIMAT) 2.5 MCG/ACTUATION MIST    Inhale 2 puffs into the lungs once daily. Controller    TRAMADOL (ULTRAM) 50 MG TABLET    Take 1 tablet (50 mg total) by mouth every 6 (six) hours as needed for Pain.     Review of patient's allergies indicates:   Allergen Reactions    Betadine [povidone-iodine] Swelling and Rash     Topical Ophthalmic 5% Betadine solution       Review of Systems   Constitution: Negative for fever.   Cardiovascular: Negative for chest pain.   Respiratory: Negative for cough and shortness of breath.    Skin: Negative for rash.   Musculoskeletal: Positive for joint pain, myalgias and stiffness. Negative for joint swelling.   Gastrointestinal: Negative for heartburn.   Neurological: Negative for headaches and numbness.         Objective:        General    Nursing note and vitals reviewed.  Constitutional: He is oriented to person, place, and time. He appears well-developed and  well-nourished.   HENT:   Head: Normocephalic and atraumatic.   Eyes: EOM are normal.   Cardiovascular: Normal rate and regular rhythm.    Pulmonary/Chest: Effort normal and breath sounds normal.   Abdominal: Soft.   Neurological: He is alert and oriented to person, place, and time.   Psychiatric: He has a normal mood and affect. His behavior is normal.         Right Shoulder Exam     Inspection/Observation   Swelling: absent  Bruising: absent  Scars: absent  Deformity: absent  Scapular Dyskinesia: negative    Range of Motion   Active abduction: normal   Passive abduction: normal   Extension: normal   Forward Flexion: normal   Forward Elevation: normal  Adduction: normal  External Rotation 0 degrees: normal   Internal rotation 0 degrees: normal     Left Shoulder Exam     Inspection/Observation   Swelling: absent  Bruising: absent  Scars: absent  Deformity: absent  Scapular Dyskinesia: negative    Tenderness   The patient is tender to palpation of the greater tuberosity and biceps tendon.    Range of Motion   Active abduction: normal   Passive abduction: normal   Extension: normal   Forward Flexion: normal   Forward Elevation: normal  Adduction: normal  External Rotation 0 degrees: normal   Internal rotation 0 degrees: normal     Tests & Signs   Drop arm: negative  Mejia test: positive  Impingement: positive  Rotator Cuff Painful Arc/Range: severe  Active Compression Test (Whitehall's Sign): positive  Speed's Test: positive    Other   Sensation: normal     Comments:         Muscle Strength   Right Upper Extremity   Shoulder Abduction: 5/5   Shoulder Internal Rotation: 5/5   Shoulder External Rotation: 5/5   Left Upper Extremity  Shoulder Abduction: 5/5   Shoulder Internal Rotation: 5/5   Shoulder External Rotation: 5/5     Vascular Exam       Left Pulses      Radial:                    2+      Capillary Refill  Left Hand: normal capillary refill              Xray images and report were reviewed today.  I agree with  the radiologist's interpretation.    X-Ray Shoulder 2 or More Views Left  Narrative: EXAMINATION:  XR SHOULDER COMPLETE 2 OR MORE VIEWS LEFT    CLINICAL HISTORY:  Pain in left shoulder    TECHNIQUE:  Two or three views of the left shoulder were preformed.    COMPARISON:  None    FINDINGS:  No acute fracture or dislocation.  There is at least mild degenerative change present at the AC joint.  No significant degenerative change at the glenohumeral joint.  There is evidence for prior median sternotomy.  Partial visualization of a partially calcified mass in the supraclavicular region on the right, stable when compared to multiple prior exams.  Impression: 1.  As above    Electronically signed by: David Luo DO  Date:    11/05/2020  Time:    08:28        Assessment:       No diagnosis found.       Plan:       There are no diagnoses linked to this encounter.    Kelton Mcdaniels is a new pt who comes in today for the above problems.  I think his symptoms are impingement related.  We have discussed risks and benefits of a corticosteroid injection today.  He wishes to proceed.  He can continue working on a home exercise program for symptomatic relief as well.  He has failed Voltaren gel.  I recommend submitting in order to [facility] for a topical compound cream that he can use 3 times daily.  He may continue to take Tylenol over-the-counter as needed as well.  I will be happy to see him back in the office in 1 month.  If he does not see any improvement in symptoms, he may benefit from further diagnostic workup of the neck as well as the shoulder.  They verbalized understanding and agree.    No follow-ups on file.          The patient understands, chooses and consents to this plan and accepts all   the risks which include but are not limited to the risks mentioned above.     Disclaimer: This note was prepared using a voice recognition system and is likely to have sound alike errors within the text.

## 2020-12-06 ENCOUNTER — PATIENT OUTREACH (OUTPATIENT)
Dept: ADMINISTRATIVE | Facility: OTHER | Age: 85
End: 2020-12-06

## 2020-12-06 NOTE — PROGRESS NOTES
Health Maintenance Due   Topic Date Due    TETANUS VACCINE  01/03/1946    Aspirin/Antiplatelet Therapy  01/03/1946    Shingles Vaccine (1 of 2) 01/03/1978     Updates were requested from care everywhere.  Chart was reviewed for overdue Proactive [facility] Encounters ([facility]) topics (CRS, Breast Cancer Screening, Eye exam)  Health Maintenance has been updated.  LINKS immunization registry triggered.  Immunizations were reconciled.

## 2020-12-07 ENCOUNTER — HOSPITAL ENCOUNTER (OUTPATIENT)
Dept: RADIOLOGY | Facility: HOSPITAL | Age: 85
Discharge: HOME | End: 2020-12-07
Attending: PHYSICIAN ASSISTANT
Payer: MEDICARE

## 2020-12-07 ENCOUNTER — OFFICE VISIT (OUTPATIENT)
Dept: ORTHOPEDICS | Facility: CLINIC | Age: 85
End: 2020-12-07
Payer: MEDICARE

## 2020-12-07 ENCOUNTER — PATIENT MESSAGE (OUTPATIENT)
Dept: ORTHOPEDICS | Facility: CLINIC | Age: 85
End: 2020-12-07

## 2020-12-07 VITALS
DIASTOLIC BLOOD PRESSURE: 81 MMHG | BODY MASS INDEX: 26.06 KG/M2 | HEIGHT: 67 IN | HEART RATE: 79 BPM | SYSTOLIC BLOOD PRESSURE: 164 MMHG | WEIGHT: 166 LBS

## 2020-12-07 DIAGNOSIS — M79.602 LEFT ARM PAIN: ICD-10-CM

## 2020-12-07 DIAGNOSIS — G89.29 CHRONIC LEFT SHOULDER PAIN: ICD-10-CM

## 2020-12-07 DIAGNOSIS — M54.2 CERVICALGIA: Primary | ICD-10-CM

## 2020-12-07 DIAGNOSIS — M25.512 CHRONIC LEFT SHOULDER PAIN: ICD-10-CM

## 2020-12-07 DIAGNOSIS — M54.2 NECK PAIN: ICD-10-CM

## 2020-12-07 DIAGNOSIS — M47.892 OTHER SPONDYLOSIS, CERVICAL REGION: ICD-10-CM

## 2020-12-07 DIAGNOSIS — M54.2 NECK PAIN: Primary | ICD-10-CM

## 2020-12-07 DIAGNOSIS — M50.30 DDD (DEGENERATIVE DISC DISEASE), CERVICAL: ICD-10-CM

## 2020-12-07 PROCEDURE — 99213 PR OFFICE/OUTPT VISIT, EST, LEVL III, 20-29 MIN: ICD-10-PCS | Mod: S$PBB,,, | Performed by: PHYSICIAN ASSISTANT

## 2020-12-07 PROCEDURE — 72040 X-RAY EXAM NECK SPINE 2-3 VW: CPT | Mod: TC

## 2020-12-07 PROCEDURE — 72040 X-RAY EXAM NECK SPINE 2-3 VW: CPT | Mod: 26,,, | Performed by: RADIOLOGY

## 2020-12-07 PROCEDURE — 99999 PR PBB SHADOW E&M-EST. PATIENT-LVL V: CPT | Mod: PBBFAC,,, | Performed by: PHYSICIAN ASSISTANT

## 2020-12-07 PROCEDURE — 99999 PR PBB SHADOW E&M-EST. PATIENT-LVL V: ICD-10-PCS | Mod: PBBFAC,,, | Performed by: PHYSICIAN ASSISTANT

## 2020-12-07 PROCEDURE — 99213 OFFICE O/P EST LOW 20 MIN: CPT | Mod: S$PBB,,, | Performed by: PHYSICIAN ASSISTANT

## 2020-12-07 PROCEDURE — 99215 OFFICE O/P EST HI 40 MIN: CPT | Mod: PBBFAC,25 | Performed by: PHYSICIAN ASSISTANT

## 2020-12-07 PROCEDURE — 72040 XR CERVICAL SPINE AP LATERAL: ICD-10-PCS | Mod: 26,,, | Performed by: RADIOLOGY

## 2020-12-07 RX ORDER — METHYLPREDNISOLONE 4 MG/1
TABLET ORAL
Qty: 1 PACKAGE | Refills: 0 | Status: SHIPPED | OUTPATIENT
Start: 2020-12-07 | End: 2021-01-08

## 2020-12-07 NOTE — PROGRESS NOTES
Patient ID: Kelton Mcdaniels is a 89 y.o. male.    Chief Complaint: Pain of the Left Shoulder      HPI: Kelton Mcdaniels  is a 89 y.o. male who c/o Pain of the Left Shoulder   for duration of about a year now.  He was originally diagnosed with shingles and neuropathic pain.  He has done gabapentin.  When pain did not resolve, they wanted him to get an orthopedic evaluation.  At his last office visit I gave him a subacromial steroid injection.  He said that did nothing to alleviate his symptoms.  He is now complaining of mainly burning pain in the left shoulder.  Says range of motion is less painful.  He has been working with physical therapy.  He says he feels great at therapy but when he leaves starts hurting again.  Alleviating factors nothing in particular.  Aggravating factors include weight-bearing and nighttime.    Past Medical History:   Diagnosis Date    BPH (benign prostatic hyperplasia)     Cataract     Chronic airway obstruction, not elsewhere classified     Coronary atherosclerosis of unspecified type of vessel, native or graft     Esophageal reflux     Essential hypertension     History of bladder cancer     History of colon cancer     Hyperlipidemia LDL goal <70     Impaired fasting glucose     Macular degeneration     Post herpetic neuralgia     Pulmonary hypertension 12/18/2015    TR (tricuspid regurgitation) 12/18/2015     Past Surgical History:   Procedure Laterality Date    BACK SURGERY      COLON SURGERY      CORONARY ARTERY BYPASS GRAFT      partial coloectomy for large polyp      turbt       Family History   Problem Relation Age of Onset    Heart failure Mother     Diabetes Father     Cancer Brother     Asthma Paternal Uncle      Social History     Socioeconomic History    Marital status:      Spouse name: Not on file    Number of children: Not on file    Years of education: Not on file    Highest education level: Not on file   Occupational History    Not on file    Social Needs    Financial resource strain: Not hard at all    Food insecurity     Worry: Never true     Inability: Never true    Transportation needs     Medical: No     Non-medical: No   Tobacco Use    Smoking status: Former Smoker     Packs/day: 2.00     Years: 70.00     Pack years: 140.00     Types: Cigarettes    Smokeless tobacco: Never Used   Substance and Sexual Activity    Alcohol use: No     Frequency: 2-3 times a week     Drinks per session: 1 or 2     Binge frequency: Never    Drug use: No    Sexual activity: Never   Lifestyle    Physical activity     Days per week: 5 days     Minutes per session: Not on file    Stress: To some extent   Relationships    Social connections     Talks on phone: More than three times a week     Gets together: Once a week     Attends Denominational service: Not on file     Active member of club or organization: No     Attends meetings of clubs or organizations: Never     Relationship status:    Other Topics Concern    Not on file   Social History Narrative    Not on file     Medication List with Changes/Refills   New Medications    METHYLPREDNISOLONE (MEDROL DOSEPACK) 4 MG TABLET    use as directed   Current Medications    ADVAIR DISKUS 250-50 MCG/DOSE DISKUS INHALER    INHALE 1 PUFF BY MOUTH TWICE DAILY    ALBUTEROL (PROVENTIL/VENTOLIN HFA) 90 MCG/ACTUATION INHALER    Inhale 2 puffs into the lungs every 4 (four) hours as needed. Rescue    ALBUTEROL-IPRATROPIUM (DUO-NEB) 2.5 MG-0.5 MG/3 ML NEBULIZER SOLUTION    Take 3 mLs by nebulization every 6 (six) hours while awake. DX: J44.9    AZELASTINE (ASTELIN) 137 MCG (0.1 %) NASAL SPRAY    1 spray (137 mcg total) by Nasal route 2 (two) times daily.    CARVEDILOL (COREG) 25 MG TABLET    Take 1 tablet (25 mg total) by mouth 2 (two) times daily.    DUREZOL 0.05 % DROP OPHTHALMIC SOLUTION    INSTILL 1 DROP INTO LEFT EYE 4 TIMES DAILY    FINASTERIDE (PROSCAR) 5 MG TABLET    Take 1 tablet (5 mg total) by mouth once daily.     HYDROCHLOROTHIAZIDE (HYDRODIURIL) 25 MG TABLET    Take 1 tablet (25 mg total) by mouth once daily.    HYOSCYAMINE (ANASPAZ,LEVSIN) 0.125 MG TAB    Take 0.125 mg by mouth every 4 (four) hours as needed.    KETOROLAC 0.5% (ACULAR) 0.5 % DROP    INSTILL 1 DROP INTO LEFT EYE 4 TIMES DAILY    LISINOPRIL (PRINIVIL,ZESTRIL) 40 MG TABLET    Take 40 mg by mouth.    MUCUS CLEARING DEVICE (ACAPELLA) MIROSLAVA    1 Device by Misc.(Non-Drug; Combo Route) route every 4 to 6 hours as needed.    NITROGLYCERIN (NITROSTAT) 0.4 MG SL TABLET    Place 1 tablet (0.4 mg total) under the tongue every 5 (five) minutes as needed for Chest pain.    OMEPRAZOLE (PRILOSEC) 20 MG CAPSULE    Take 1 capsule (20 mg total) by mouth once daily.    POTASSIUM CHLORIDE SA (KLOR-CON M20) 20 MEQ TABLET    Take 1 tablet (20 mEq total) by mouth 2 (two) times daily.    ROFLUMILAST (DALIRESP) 500 MCG TAB    Take 1 tablet (500 mcg total) by mouth once daily.    SIMVASTATIN (ZOCOR) 40 MG TABLET    Take 1 tablet (40 mg total) by mouth every evening.    TAMSULOSIN (FLOMAX) 0.4 MG CAP    Take 1 capsule (0.4 mg total) by mouth once daily.    TIOTROPIUM BROMIDE (SPIRIVA RESPIMAT) 2.5 MCG/ACTUATION MIST    Inhale 2 puffs into the lungs once daily. Controller    TRAMADOL (ULTRAM) 50 MG TABLET    Take 1 tablet (50 mg total) by mouth every 6 (six) hours as needed for Pain.     Review of patient's allergies indicates:   Allergen Reactions    Betadine [povidone-iodine] Swelling and Rash     Topical Ophthalmic 5% Betadine solution       Objective:        General    Nursing note and vitals reviewed.  Constitutional: He is oriented to person, place, and time. He appears well-developed and well-nourished.   HENT:   Head: Normocephalic and atraumatic.   Eyes: EOM are normal.   Cardiovascular: Normal rate and regular rhythm.    Pulmonary/Chest: Effort normal and breath sounds normal.   Abdominal: Soft.   Neurological: He is alert and oriented to person, place, and time.    Psychiatric: He has a normal mood and affect. His behavior is normal.         Back (L-Spine & T-Spine) / Neck (C-Spine) Exam     Tenderness   The patient is tender to palpation of the left trapezial.   Right Shoulder Exam     Inspection/Observation   Swelling: absent  Bruising: absent  Scars: absent  Deformity: absent  Scapular Dyskinesia: negative    Range of Motion   Active abduction: normal   Passive abduction: normal   Extension: normal   Forward Flexion: normal   Forward Elevation: normal  Adduction: normal  External Rotation 0 degrees: normal   Internal rotation 0 degrees: normal     Left Shoulder Exam     Inspection/Observation   Swelling: absent  Bruising: absent  Scars: absent  Deformity: absent  Scapular Dyskinesia: negative    Tenderness   The patient is tender to palpation of the medial scapula.    Range of Motion   Active abduction: normal   Passive abduction: normal   Extension: normal   Forward Flexion: normal   Forward Elevation: normal  Adduction: normal  External Rotation 0 degrees: normal   Internal rotation 0 degrees: normal     Tests & Signs   Drop arm: negative  Mejia test: negative  Impingement: negative  Active Compression test (Bartholomew's Sign): negative  Speed's Test: positive    Other   Sensation: normal     Comments:         Muscle Strength   Right Upper Extremity   Shoulder Abduction: 5/5   Shoulder Internal Rotation: 5/5   Shoulder External Rotation: 5/5   Left Upper Extremity  Shoulder Abduction: 5/5   Shoulder Internal Rotation: 5/5   Shoulder External Rotation: 5/5     Vascular Exam       Left Pulses      Radial:                    2+      Capillary Refill  Left Hand: normal capillary refill      Assessment:       Encounter Diagnoses   Name Primary?    Cervicalgia Yes    Left arm pain     DDD (degenerative disc disease), cervical     Chronic left shoulder pain     Other spondylosis, cervical region           Plan:       Kelton was seen today for pain.    Diagnoses and all  orders for this visit:    Cervicalgia  -     methylPREDNISolone (MEDROL DOSEPACK) 4 mg tablet; use as directed  -     MRI Cervical Spine Without Contrast; Future    Left arm pain  -     methylPREDNISolone (MEDROL DOSEPACK) 4 mg tablet; use as directed  -     MRI Cervical Spine Without Contrast; Future  -     Ambulatory referral/consult to Pain Clinic; Future    DDD (degenerative disc disease), cervical  -     methylPREDNISolone (MEDROL DOSEPACK) 4 mg tablet; use as directed  -     MRI Cervical Spine Without Contrast; Future  -     Ambulatory referral/consult to Pain Clinic; Future    Chronic left shoulder pain  -     methylPREDNISolone (MEDROL DOSEPACK) 4 mg tablet; use as directed  -     MRI Cervical Spine Without Contrast; Future    Other spondylosis, cervical region  -     MRI Cervical Spine Without Contrast; Future  -     Ambulatory referral/consult to Pain Clinic; Future        Kelton Mcdaniels is an established pt here for follow-up of left shoulder pain.  Although he tells me he still having significant discomfort in the shoulder, he points more to the paraspinal region, periscapular region, as well as the shoulder.  Additionally, all Shoulder exam findings have improved significantly since last office visit.  Now suspect that cervical component is contributing to his pain.  I would like him to get an x-ray of the neck on its way out today.  I will send a message to floor those results.  Additionally I would like to get an MRI to evaluate for spinal stenosis and potential nerve impingement.  I would refer him to interventional pain management once the MRI has been.  In the meantime, I put him on a Medrol Dosepak in hopes that this will decrease inflammation improved symptoms.  He has failed PT.  He verbalizes understanding and agrees.    Follow up for IPM consult after MRI.    The patient understands, chooses and consents to this plan and accepts all   the risks which include but are not limited to the risks  mentioned above.     Disclaimer: This note was prepared using a voice recognition system and is likely to have sound alike errors within the text.

## 2020-12-16 ENCOUNTER — HOSPITAL ENCOUNTER (OUTPATIENT)
Dept: RADIOLOGY | Facility: HOSPITAL | Age: 85
Discharge: HOME | End: 2020-12-16
Attending: PHYSICIAN ASSISTANT
Payer: MEDICARE

## 2020-12-16 ENCOUNTER — PATIENT MESSAGE (OUTPATIENT)
Dept: ORTHOPEDICS | Facility: CLINIC | Age: 85
End: 2020-12-16

## 2020-12-16 DIAGNOSIS — M54.2 CERVICALGIA: ICD-10-CM

## 2020-12-16 DIAGNOSIS — M47.892 OTHER SPONDYLOSIS, CERVICAL REGION: ICD-10-CM

## 2020-12-16 DIAGNOSIS — M79.602 LEFT ARM PAIN: ICD-10-CM

## 2020-12-16 DIAGNOSIS — G89.29 CHRONIC LEFT SHOULDER PAIN: ICD-10-CM

## 2020-12-16 DIAGNOSIS — M25.512 CHRONIC LEFT SHOULDER PAIN: ICD-10-CM

## 2020-12-16 DIAGNOSIS — M50.30 DDD (DEGENERATIVE DISC DISEASE), CERVICAL: ICD-10-CM

## 2020-12-16 PROCEDURE — 72141 MRI NECK SPINE W/O DYE: CPT | Mod: TC

## 2020-12-16 PROCEDURE — 72141 MRI NECK SPINE W/O DYE: CPT | Mod: 26,,, | Performed by: RADIOLOGY

## 2020-12-16 PROCEDURE — 72141 MRI CERVICAL SPINE WITHOUT CONTRAST: ICD-10-PCS | Mod: 26,,, | Performed by: RADIOLOGY

## 2020-12-17 ENCOUNTER — PROCEDURE VISIT (OUTPATIENT)
Dept: OPHTHALMOLOGY | Facility: CLINIC | Age: 85
End: 2020-12-17
Payer: MEDICARE

## 2020-12-17 DIAGNOSIS — H35.3221 EXUDATIVE AGE-RELATED MACULAR DEGENERATION OF LEFT EYE WITH ACTIVE CHOROIDAL NEOVASCULARIZATION: Primary | ICD-10-CM

## 2020-12-17 PROCEDURE — 92134 POSTERIOR SEGMENT OCT RETINA (OCULAR COHERENCE TOMOGRAPHY)-BOTH EYES: ICD-10-PCS | Mod: 26,S$PBB,, | Performed by: OPHTHALMOLOGY

## 2020-12-17 PROCEDURE — 99499 NO LOS: ICD-10-PCS | Mod: S$PBB,,, | Performed by: OPHTHALMOLOGY

## 2020-12-17 PROCEDURE — 67028 INJECTION EYE DRUG: CPT | Mod: 79,S$PBB,LT, | Performed by: OPHTHALMOLOGY

## 2020-12-17 PROCEDURE — 99499 UNLISTED E&M SERVICE: CPT | Mod: S$PBB,,, | Performed by: OPHTHALMOLOGY

## 2020-12-17 PROCEDURE — 92134 CPTRZ OPH DX IMG PST SGM RTA: CPT | Mod: PBBFAC | Performed by: OPHTHALMOLOGY

## 2020-12-17 PROCEDURE — 67028 PR INJECT INTRAVITREAL PHARMCOLOGIC: ICD-10-PCS | Mod: 79,S$PBB,LT, | Performed by: OPHTHALMOLOGY

## 2020-12-17 PROCEDURE — 67028 INJECTION EYE DRUG: CPT | Mod: PBBFAC,LT | Performed by: OPHTHALMOLOGY

## 2020-12-17 RX ADMIN — AFLIBERCEPT 2 MG: 40 INJECTION, SOLUTION INTRAVITREAL at 01:12

## 2020-12-17 NOTE — PROGRESS NOTES
===============================  Kelton Mcdaniels,  12/17/2020 today   89 y.o. male   Last visit [facility]: :11/4/2020   Last visit eye dept. 11/4/2020  [facility]:  Corrected distance visual acuity was 20/400 in the right eye and 20/80 in the left eye.   Not recorded         Not recorded         Not recorded         Not recorded        Chief Complaint   Patient presents with    Macular Degeneration     eylea os     Ophthalmic Medications     Ophthalmic - Anti-inflammatory, Glucocorticoids Start End     DUREZOL 0.05 % Drop ophthalmic solution    10/11/2020     Sig: INSTILL 1 DROP INTO LEFT EYE 4 TIMES DAILY    Class: Historical Med    Ophthalmic - Anti-inflammatory, NSAIDs Start End     ketorolac 0.5% (ACULAR) 0.5 % Drop    10/9/2020     Sig: INSTILL 1 DROP INTO LEFT EYE 4 TIMES DAILY    Class: Historical Med        ________________  12/17/2020 today  HPI     Macular Degeneration      Additional comments: eylea os              Comments     Patient denies any pain/irritation since last visit  Reports vision as stable     Vigamox Prep/NO BETADINE/no Bleb    --Smd/srn od dx. 10/5/15   History of avastin and eylea od, last 1/20/17  --2+ vac cat ou  New Srn os dx and treated with avastin 3/15/19   Last Eylea OS  11/4/20  PCIOL OS SN60WF +21.0 /CDE 29.66 9/10/20  PCIOL OD +22.0 SN60WF/CDE: 20.61 10/08/20      Durezol and Keto QID OD            Last edited by Megan Leong on 12/17/2020  1:10 PM. (History)      Problem List Items Addressed This Visit     None        stabel os monuclar  ..    Injection Procedure Note:    12/17/2020  Diagnosis :  oss rn  Today:   Eylea (afibercept) 2 mg/0.05 ml Intravitreal Injection , OS   Follow up: rtc 7 weeks cornell sliow txx monulcar and hes happy w current stsaus       Instructed to call 24/7 for any worsening of vision. Check Both eyes daily. Gave patient my home phone number.  Risks, benefits, and alternatives to treatment discussed in detail with the patient.  The patient voiced understanding  and wished to proceed with the procedure.     Patient Identified and Time Out complete  Subconjunctival bleb - xylocaine with epi 2%   and Betadine.  Inject at Eylea (afibercept) 2 mg/0.05 ml Intravitreal Injection , OS 6:00 @ 3.5-4mm posterior to limbus  1 stop: na   Post Operative Dx: Same  Complications: None  Follow up as above.  .      ===========================

## 2020-12-23 DIAGNOSIS — J44.9 COPD, SEVERE: Primary | ICD-10-CM

## 2021-01-08 ENCOUNTER — TELEPHONE (OUTPATIENT)
Dept: PULMONOLOGY | Facility: CLINIC | Age: 86
End: 2021-01-08

## 2021-01-08 ENCOUNTER — OFFICE VISIT (OUTPATIENT)
Dept: PULMONOLOGY | Facility: CLINIC | Age: 86
End: 2021-01-08
Payer: MEDICARE

## 2021-01-08 ENCOUNTER — HOSPITAL ENCOUNTER (OUTPATIENT)
Dept: RADIOLOGY | Facility: HOSPITAL | Age: 86
Discharge: HOME | End: 2021-01-08
Attending: INTERNAL MEDICINE
Payer: MEDICARE

## 2021-01-08 VITALS
OXYGEN SATURATION: 95 % | DIASTOLIC BLOOD PRESSURE: 90 MMHG | RESPIRATION RATE: 20 BRPM | WEIGHT: 170.94 LBS | HEIGHT: 67 IN | BODY MASS INDEX: 26.83 KG/M2 | SYSTOLIC BLOOD PRESSURE: 140 MMHG | HEART RATE: 91 BPM

## 2021-01-08 DIAGNOSIS — J18.9 PNEUMONIA OF BOTH LOWER LOBES DUE TO INFECTIOUS ORGANISM: ICD-10-CM

## 2021-01-08 DIAGNOSIS — I10 ESSENTIAL HYPERTENSION: ICD-10-CM

## 2021-01-08 DIAGNOSIS — J44.1 COPD EXACERBATION: ICD-10-CM

## 2021-01-08 DIAGNOSIS — J44.9 COPD, SEVERE: ICD-10-CM

## 2021-01-08 DIAGNOSIS — R06.02 SOB (SHORTNESS OF BREATH): ICD-10-CM

## 2021-01-08 DIAGNOSIS — J90 BILATERAL PLEURAL EFFUSION: Primary | ICD-10-CM

## 2021-01-08 PROCEDURE — 99999 PR PBB SHADOW E&M-EST. PATIENT-LVL V: CPT | Mod: PBBFAC,,, | Performed by: NURSE PRACTITIONER

## 2021-01-08 PROCEDURE — 99214 PR OFFICE/OUTPT VISIT, EST, LEVL IV, 30-39 MIN: ICD-10-PCS | Mod: S$PBB,,, | Performed by: NURSE PRACTITIONER

## 2021-01-08 PROCEDURE — 99214 OFFICE O/P EST MOD 30 MIN: CPT | Mod: S$PBB,,, | Performed by: NURSE PRACTITIONER

## 2021-01-08 PROCEDURE — 71046 XR CHEST PA AND LATERAL: ICD-10-PCS | Mod: 26,,, | Performed by: RADIOLOGY

## 2021-01-08 PROCEDURE — 71046 X-RAY EXAM CHEST 2 VIEWS: CPT | Mod: 26,,, | Performed by: RADIOLOGY

## 2021-01-08 PROCEDURE — 99999 PR PBB SHADOW E&M-EST. PATIENT-LVL V: ICD-10-PCS | Mod: PBBFAC,,, | Performed by: NURSE PRACTITIONER

## 2021-01-08 PROCEDURE — 71046 X-RAY EXAM CHEST 2 VIEWS: CPT | Mod: TC

## 2021-01-08 PROCEDURE — 99215 OFFICE O/P EST HI 40 MIN: CPT | Mod: PBBFAC,25 | Performed by: NURSE PRACTITIONER

## 2021-01-08 RX ORDER — HYDROCHLOROTHIAZIDE 25 MG/1
25 TABLET ORAL DAILY
Qty: 90 TABLET | Refills: 3 | Status: SHIPPED | OUTPATIENT
Start: 2021-01-08 | End: 2021-01-22 | Stop reason: ALTCHOICE

## 2021-01-08 RX ORDER — PREDNISONE 20 MG/1
TABLET ORAL
Qty: 20 TABLET | Refills: 0 | Status: SHIPPED | OUTPATIENT
Start: 2021-01-08 | End: 2021-02-04

## 2021-01-08 RX ORDER — DOXYCYCLINE 100 MG/1
100 CAPSULE ORAL EVERY 12 HOURS
Qty: 20 CAPSULE | Refills: 0 | Status: SHIPPED | OUTPATIENT
Start: 2021-01-08 | End: 2021-01-18

## 2021-01-21 ENCOUNTER — TELEPHONE (OUTPATIENT)
Dept: CARDIOLOGY | Facility: CLINIC | Age: 86
End: 2021-01-21

## 2021-01-21 DIAGNOSIS — I25.10 CAD IN NATIVE ARTERY: Primary | ICD-10-CM

## 2021-01-22 ENCOUNTER — HOSPITAL ENCOUNTER (OUTPATIENT)
Dept: CARDIOLOGY | Facility: HOSPITAL | Age: 86
Discharge: HOME | End: 2021-01-22
Attending: INTERNAL MEDICINE
Payer: MEDICARE

## 2021-01-22 ENCOUNTER — OFFICE VISIT (OUTPATIENT)
Dept: OTOLARYNGOLOGY | Facility: CLINIC | Age: 86
End: 2021-01-22
Payer: MEDICARE

## 2021-01-22 ENCOUNTER — OFFICE VISIT (OUTPATIENT)
Dept: CARDIOLOGY | Facility: CLINIC | Age: 86
End: 2021-01-22
Payer: MEDICARE

## 2021-01-22 VITALS
DIASTOLIC BLOOD PRESSURE: 88 MMHG | BODY MASS INDEX: 25.51 KG/M2 | HEIGHT: 67 IN | SYSTOLIC BLOOD PRESSURE: 156 MMHG | HEART RATE: 95 BPM | WEIGHT: 162.5 LBS | OXYGEN SATURATION: 98 %

## 2021-01-22 VITALS
DIASTOLIC BLOOD PRESSURE: 74 MMHG | HEIGHT: 67 IN | SYSTOLIC BLOOD PRESSURE: 144 MMHG | WEIGHT: 162.25 LBS | TEMPERATURE: 98 F | BODY MASS INDEX: 25.47 KG/M2 | HEART RATE: 97 BPM

## 2021-01-22 DIAGNOSIS — R09.02 HYPOXEMIA REQUIRING SUPPLEMENTAL OXYGEN: ICD-10-CM

## 2021-01-22 DIAGNOSIS — I07.1 TRICUSPID VALVE INSUFFICIENCY, UNSPECIFIED ETIOLOGY: ICD-10-CM

## 2021-01-22 DIAGNOSIS — I50.33 ACUTE ON CHRONIC DIASTOLIC CONGESTIVE HEART FAILURE: Primary | ICD-10-CM

## 2021-01-22 DIAGNOSIS — R09.81 NASAL CONGESTION: Primary | ICD-10-CM

## 2021-01-22 DIAGNOSIS — J44.9 COPD, SEVERE: Chronic | ICD-10-CM

## 2021-01-22 DIAGNOSIS — E78.2 MIXED HYPERLIPIDEMIA: ICD-10-CM

## 2021-01-22 DIAGNOSIS — I47.19 MULTIFOCAL ATRIAL TACHYCARDIA: ICD-10-CM

## 2021-01-22 DIAGNOSIS — I27.20 PULMONARY HYPERTENSION: ICD-10-CM

## 2021-01-22 DIAGNOSIS — I48.0 PAROXYSMAL ATRIAL FIBRILLATION: ICD-10-CM

## 2021-01-22 DIAGNOSIS — Z95.1 S/P CABG (CORONARY ARTERY BYPASS GRAFT): ICD-10-CM

## 2021-01-22 DIAGNOSIS — Z87.891 FORMER VERY HEAVY CIGARETTE SMOKER (MORE THAN 40 PER DAY): ICD-10-CM

## 2021-01-22 DIAGNOSIS — I10 ESSENTIAL HYPERTENSION: ICD-10-CM

## 2021-01-22 DIAGNOSIS — R94.31 ABNORMAL ECG: ICD-10-CM

## 2021-01-22 DIAGNOSIS — I25.10 CAD IN NATIVE ARTERY: ICD-10-CM

## 2021-01-22 DIAGNOSIS — J44.9 COPD, SEVERE: ICD-10-CM

## 2021-01-22 DIAGNOSIS — E87.1 HYPONATREMIA: ICD-10-CM

## 2021-01-22 DIAGNOSIS — Z99.81 HYPOXEMIA REQUIRING SUPPLEMENTAL OXYGEN: ICD-10-CM

## 2021-01-22 PROCEDURE — 99214 PR OFFICE/OUTPT VISIT, EST, LEVL IV, 30-39 MIN: ICD-10-PCS | Mod: S$PBB,,, | Performed by: PHYSICIAN ASSISTANT

## 2021-01-22 PROCEDURE — 93010 EKG 12-LEAD: ICD-10-PCS | Mod: ,,, | Performed by: INTERNAL MEDICINE

## 2021-01-22 PROCEDURE — 93010 ELECTROCARDIOGRAM REPORT: CPT | Mod: ,,, | Performed by: INTERNAL MEDICINE

## 2021-01-22 PROCEDURE — 99999 PR PBB SHADOW E&M-EST. PATIENT-LVL IV: ICD-10-PCS | Mod: PBBFAC,,, | Performed by: PHYSICIAN ASSISTANT

## 2021-01-22 PROCEDURE — 93005 ELECTROCARDIOGRAM TRACING: CPT

## 2021-01-22 PROCEDURE — 99204 PR OFFICE/OUTPT VISIT, NEW, LEVL IV, 45-59 MIN: ICD-10-PCS | Mod: S$PBB,,, | Performed by: INTERNAL MEDICINE

## 2021-01-22 PROCEDURE — 99204 OFFICE O/P NEW MOD 45 MIN: CPT | Mod: S$PBB,,, | Performed by: INTERNAL MEDICINE

## 2021-01-22 PROCEDURE — 99214 OFFICE O/P EST MOD 30 MIN: CPT | Mod: S$PBB,,, | Performed by: PHYSICIAN ASSISTANT

## 2021-01-22 PROCEDURE — 99215 OFFICE O/P EST HI 40 MIN: CPT | Mod: PBBFAC | Performed by: INTERNAL MEDICINE

## 2021-01-22 PROCEDURE — 99999 PR PBB SHADOW E&M-EST. PATIENT-LVL V: ICD-10-PCS | Mod: PBBFAC,,, | Performed by: INTERNAL MEDICINE

## 2021-01-22 PROCEDURE — 99999 PR PBB SHADOW E&M-EST. PATIENT-LVL V: CPT | Mod: PBBFAC,,, | Performed by: INTERNAL MEDICINE

## 2021-01-22 PROCEDURE — 99999 PR PBB SHADOW E&M-EST. PATIENT-LVL IV: CPT | Mod: PBBFAC,,, | Performed by: PHYSICIAN ASSISTANT

## 2021-01-22 PROCEDURE — 99214 OFFICE O/P EST MOD 30 MIN: CPT | Mod: PBBFAC,27,25 | Performed by: PHYSICIAN ASSISTANT

## 2021-01-22 RX ORDER — FUROSEMIDE 20 MG/1
20 TABLET ORAL DAILY
Qty: 90 TABLET | Refills: 4 | Status: SHIPPED | OUTPATIENT
Start: 2021-01-22 | End: 2022-01-22

## 2021-01-22 RX ORDER — FLUTICASONE PROPIONATE 50 MCG
2 SPRAY, SUSPENSION (ML) NASAL DAILY
Qty: 16 G | Refills: 11 | Status: SHIPPED | OUTPATIENT
Start: 2021-01-22 | End: 2022-01-22

## 2021-01-28 ENCOUNTER — LAB VISIT (OUTPATIENT)
Dept: LAB | Facility: HOSPITAL | Age: 86
End: 2021-01-28
Attending: INTERNAL MEDICINE
Payer: MEDICARE

## 2021-01-28 DIAGNOSIS — I10 ESSENTIAL HYPERTENSION: ICD-10-CM

## 2021-01-28 DIAGNOSIS — I50.33 ACUTE ON CHRONIC DIASTOLIC CONGESTIVE HEART FAILURE: ICD-10-CM

## 2021-01-28 DIAGNOSIS — R73.01 IMPAIRED FASTING GLUCOSE: ICD-10-CM

## 2021-01-28 LAB
BASOPHILS # BLD AUTO: 0.03 K/UL (ref 0–0.2)
BASOPHILS NFR BLD: 0.4 % (ref 0–1.9)
DIFFERENTIAL METHOD: ABNORMAL
EOSINOPHIL # BLD AUTO: 0.3 K/UL (ref 0–0.5)
EOSINOPHIL NFR BLD: 3.4 % (ref 0–8)
ERYTHROCYTE [DISTWIDTH] IN BLOOD BY AUTOMATED COUNT: 12.9 % (ref 11.5–14.5)
HCT VFR BLD AUTO: 45.9 % (ref 40–54)
HGB BLD-MCNC: 14 G/DL (ref 14–18)
IMM GRANULOCYTES # BLD AUTO: 0.03 K/UL (ref 0–0.04)
IMM GRANULOCYTES NFR BLD AUTO: 0.4 % (ref 0–0.5)
LYMPHOCYTES # BLD AUTO: 1.5 K/UL (ref 1–4.8)
LYMPHOCYTES NFR BLD: 18.6 % (ref 18–48)
MCH RBC QN AUTO: 32.3 PG (ref 27–31)
MCHC RBC AUTO-ENTMCNC: 30.5 G/DL (ref 32–36)
MCV RBC AUTO: 106 FL (ref 82–98)
MONOCYTES # BLD AUTO: 0.6 K/UL (ref 0.3–1)
MONOCYTES NFR BLD: 7.5 % (ref 4–15)
NEUTROPHILS # BLD AUTO: 5.6 K/UL (ref 1.8–7.7)
NEUTROPHILS NFR BLD: 69.7 % (ref 38–73)
NRBC BLD-RTO: 0 /100 WBC
PLATELET # BLD AUTO: 183 K/UL (ref 150–350)
PMV BLD AUTO: 10.3 FL (ref 9.2–12.9)
RBC # BLD AUTO: 4.33 M/UL (ref 4.6–6.2)
WBC # BLD AUTO: 7.96 K/UL (ref 3.9–12.7)

## 2021-01-28 PROCEDURE — 80061 LIPID PANEL: CPT

## 2021-01-28 PROCEDURE — 85025 COMPLETE CBC W/AUTO DIFF WBC: CPT

## 2021-01-28 PROCEDURE — 80053 COMPREHEN METABOLIC PANEL: CPT

## 2021-01-28 PROCEDURE — 36415 COLL VENOUS BLD VENIPUNCTURE: CPT | Mod: PO

## 2021-01-28 PROCEDURE — 83880 ASSAY OF NATRIURETIC PEPTIDE: CPT

## 2021-01-28 PROCEDURE — 83036 HEMOGLOBIN GLYCOSYLATED A1C: CPT

## 2021-01-28 PROCEDURE — 84443 ASSAY THYROID STIM HORMONE: CPT

## 2021-01-29 ENCOUNTER — HOSPITAL ENCOUNTER (OUTPATIENT)
Dept: CARDIOLOGY | Facility: HOSPITAL | Age: 86
Discharge: HOME | End: 2021-01-29
Attending: INTERNAL MEDICINE
Payer: MEDICARE

## 2021-01-29 VITALS
HEART RATE: 85 BPM | HEIGHT: 67 IN | WEIGHT: 162 LBS | BODY MASS INDEX: 25.43 KG/M2 | SYSTOLIC BLOOD PRESSURE: 144 MMHG | DIASTOLIC BLOOD PRESSURE: 74 MMHG

## 2021-01-29 DIAGNOSIS — R09.02 HYPOXEMIA REQUIRING SUPPLEMENTAL OXYGEN: ICD-10-CM

## 2021-01-29 DIAGNOSIS — Z99.81 HYPOXEMIA REQUIRING SUPPLEMENTAL OXYGEN: ICD-10-CM

## 2021-01-29 DIAGNOSIS — J44.9 COPD, SEVERE: ICD-10-CM

## 2021-01-29 DIAGNOSIS — Z95.1 S/P CABG (CORONARY ARTERY BYPASS GRAFT): ICD-10-CM

## 2021-01-29 DIAGNOSIS — I47.19 MULTIFOCAL ATRIAL TACHYCARDIA: ICD-10-CM

## 2021-01-29 DIAGNOSIS — I07.1 TRICUSPID VALVE INSUFFICIENCY, UNSPECIFIED ETIOLOGY: ICD-10-CM

## 2021-01-29 DIAGNOSIS — I27.20 PULMONARY HYPERTENSION: ICD-10-CM

## 2021-01-29 DIAGNOSIS — R94.31 ABNORMAL ECG: ICD-10-CM

## 2021-01-29 DIAGNOSIS — I50.33 ACUTE ON CHRONIC DIASTOLIC CONGESTIVE HEART FAILURE: ICD-10-CM

## 2021-01-29 DIAGNOSIS — I25.10 CAD IN NATIVE ARTERY: ICD-10-CM

## 2021-01-29 DIAGNOSIS — I48.0 PAROXYSMAL ATRIAL FIBRILLATION: ICD-10-CM

## 2021-01-29 DIAGNOSIS — I10 ESSENTIAL HYPERTENSION: ICD-10-CM

## 2021-01-29 LAB
ALBUMIN SERPL BCP-MCNC: 3.5 G/DL (ref 3.5–5.2)
ALBUMIN SERPL BCP-MCNC: 3.5 G/DL (ref 3.5–5.2)
ALP SERPL-CCNC: 59 U/L (ref 55–135)
ALP SERPL-CCNC: 59 U/L (ref 55–135)
ALT SERPL W/O P-5'-P-CCNC: 15 U/L (ref 10–44)
ALT SERPL W/O P-5'-P-CCNC: 15 U/L (ref 10–44)
ANION GAP SERPL CALC-SCNC: 9 MMOL/L (ref 8–16)
ANION GAP SERPL CALC-SCNC: 9 MMOL/L (ref 8–16)
AORTIC ROOT ANNULUS: 3.29 CM
AST SERPL-CCNC: 15 U/L (ref 10–40)
AST SERPL-CCNC: 15 U/L (ref 10–40)
AV INDEX (PROSTH): 0.44
AV MEAN GRADIENT: 3 MMHG
AV PEAK GRADIENT: 6 MMHG
AV VALVE AREA: 1.57 CM2
AV VELOCITY RATIO: 0.42
BILIRUB SERPL-MCNC: 0.5 MG/DL (ref 0.1–1)
BILIRUB SERPL-MCNC: 0.5 MG/DL (ref 0.1–1)
BNP SERPL-MCNC: 195 PG/ML (ref 0–99)
BSA FOR ECHO PROCEDURE: 1.86 M2
BUN SERPL-MCNC: 10 MG/DL (ref 10–30)
BUN SERPL-MCNC: 10 MG/DL (ref 10–30)
CALCIUM SERPL-MCNC: 8.8 MG/DL (ref 8.7–10.5)
CALCIUM SERPL-MCNC: 8.8 MG/DL (ref 8.7–10.5)
CHLORIDE SERPL-SCNC: 95 MMOL/L (ref 95–110)
CHLORIDE SERPL-SCNC: 95 MMOL/L (ref 95–110)
CHOLEST SERPL-MCNC: 157 MG/DL (ref 120–199)
CHOLEST/HDLC SERPL: 3.1 {RATIO} (ref 2–5)
CO2 SERPL-SCNC: 34 MMOL/L (ref 23–29)
CO2 SERPL-SCNC: 34 MMOL/L (ref 23–29)
CREAT SERPL-MCNC: 0.7 MG/DL (ref 0.5–1.4)
CREAT SERPL-MCNC: 0.7 MG/DL (ref 0.5–1.4)
CV ECHO LV RWT: 0.66 CM
DOP CALC AO PEAK VEL: 1.23 M/S
DOP CALC AO VTI: 24.16 CM
DOP CALC LVOT AREA: 3.6 CM2
DOP CALC LVOT DIAMETER: 2.14 CM
DOP CALC LVOT PEAK VEL: 0.52 M/S
DOP CALC LVOT STROKE VOLUME: 37.93 CM3
DOP CALC RVOT PEAK VEL: 0.5 M/S
DOP CALC RVOT VTI: 9.75 CM
DOP CALCLVOT PEAK VEL VTI: 10.55 CM
ECHO LV POSTERIOR WALL: 1.41 CM (ref 0.6–1.1)
EST. GFR  (AFRICAN AMERICAN): >60 ML/MIN/1.73 M^2
EST. GFR  (AFRICAN AMERICAN): >60 ML/MIN/1.73 M^2
EST. GFR  (NON AFRICAN AMERICAN): >60 ML/MIN/1.73 M^2
EST. GFR  (NON AFRICAN AMERICAN): >60 ML/MIN/1.73 M^2
ESTIMATED AVG GLUCOSE: 120 MG/DL (ref 68–131)
FRACTIONAL SHORTENING: 31 % (ref 28–44)
GLUCOSE SERPL-MCNC: 156 MG/DL (ref 70–110)
GLUCOSE SERPL-MCNC: 156 MG/DL (ref 70–110)
HBA1C MFR BLD: 5.8 % (ref 4–5.6)
HDLC SERPL-MCNC: 50 MG/DL (ref 40–75)
HDLC SERPL: 31.8 % (ref 20–50)
INTERVENTRICULAR SEPTUM: 1.31 CM (ref 0.6–1.1)
IVRT: 91.34 MSEC
LA MAJOR: 6.04 CM
LA MINOR: 5.36 CM
LA WIDTH: 4.24 CM
LDLC SERPL CALC-MCNC: 82.6 MG/DL (ref 63–159)
LEFT ATRIUM SIZE: 3.7 CM
LEFT ATRIUM VOLUME INDEX: 40.9 ML/M2
LEFT ATRIUM VOLUME: 75.74 CM3
LEFT INTERNAL DIMENSION IN SYSTOLE: 2.96 CM (ref 2.1–4)
LEFT VENTRICLE DIASTOLIC VOLUME INDEX: 43.87 ML/M2
LEFT VENTRICLE DIASTOLIC VOLUME: 81.16 ML
LEFT VENTRICLE MASS INDEX: 118 G/M2
LEFT VENTRICLE SYSTOLIC VOLUME INDEX: 18.4 ML/M2
LEFT VENTRICLE SYSTOLIC VOLUME: 33.97 ML
LEFT VENTRICULAR INTERNAL DIMENSION IN DIASTOLE: 4.26 CM (ref 3.5–6)
LEFT VENTRICULAR MASS: 219.22 G
NONHDLC SERPL-MCNC: 107 MG/DL
PISA TR MAX VEL: 3.34 M/S
POTASSIUM SERPL-SCNC: 4.3 MMOL/L (ref 3.5–5.1)
POTASSIUM SERPL-SCNC: 4.3 MMOL/L (ref 3.5–5.1)
PROT SERPL-MCNC: 6.9 G/DL (ref 6–8.4)
PROT SERPL-MCNC: 6.9 G/DL (ref 6–8.4)
PV MEAN GRADIENT: 1 MMHG
PV PEAK GRADIENT: 1 MMHG
PV PEAK S VEL: 0.43 M/S
PV PEAK VELOCITY: 0.66 CM/S
RA MAJOR: 4.92 CM
RA PRESSURE: 3 MMHG
RA WIDTH: 4.39 CM
RIGHT VENTRICULAR END-DIASTOLIC DIMENSION: 3.2 CM
SINUS: 3.11 CM
SODIUM SERPL-SCNC: 138 MMOL/L (ref 136–145)
SODIUM SERPL-SCNC: 138 MMOL/L (ref 136–145)
STJ: 3.16 CM
TDI LATERAL: 0.13 M/S
TDI SEPTAL: 0.14 M/S
TDI: 0.14 M/S
TR MAX PG: 45 MMHG
TRICUSPID ANNULAR PLANE SYSTOLIC EXCURSION: 1.1 CM
TRIGL SERPL-MCNC: 122 MG/DL (ref 30–150)
TSH SERPL DL<=0.005 MIU/L-ACNC: 2.35 UIU/ML (ref 0.4–4)
TV REST PULMONARY ARTERY PRESSURE: 48 MMHG

## 2021-01-29 PROCEDURE — 93306 TTE W/DOPPLER COMPLETE: CPT

## 2021-01-29 PROCEDURE — 93306 ECHO (CUPID ONLY): ICD-10-PCS | Mod: 26,,, | Performed by: INTERNAL MEDICINE

## 2021-01-29 PROCEDURE — 93306 TTE W/DOPPLER COMPLETE: CPT | Mod: 26,,, | Performed by: INTERNAL MEDICINE

## 2021-02-01 ENCOUNTER — PATIENT MESSAGE (OUTPATIENT)
Dept: OPHTHALMOLOGY | Facility: CLINIC | Age: 86
End: 2021-02-01

## 2021-02-04 ENCOUNTER — PROCEDURE VISIT (OUTPATIENT)
Dept: OPHTHALMOLOGY | Facility: CLINIC | Age: 86
End: 2021-02-04
Payer: MEDICARE

## 2021-02-04 ENCOUNTER — OFFICE VISIT (OUTPATIENT)
Dept: INTERNAL MEDICINE | Facility: CLINIC | Age: 86
End: 2021-02-04
Payer: MEDICARE

## 2021-02-04 VITALS
OXYGEN SATURATION: 93 % | BODY MASS INDEX: 25.81 KG/M2 | WEIGHT: 164.44 LBS | TEMPERATURE: 99 F | SYSTOLIC BLOOD PRESSURE: 142 MMHG | HEART RATE: 99 BPM | DIASTOLIC BLOOD PRESSURE: 84 MMHG | HEIGHT: 67 IN

## 2021-02-04 DIAGNOSIS — E78.2 MIXED HYPERLIPIDEMIA: ICD-10-CM

## 2021-02-04 DIAGNOSIS — Z99.81 HYPOXEMIA REQUIRING SUPPLEMENTAL OXYGEN: ICD-10-CM

## 2021-02-04 DIAGNOSIS — H35.3221 EXUDATIVE AGE-RELATED MACULAR DEGENERATION OF LEFT EYE WITH ACTIVE CHOROIDAL NEOVASCULARIZATION: Primary | ICD-10-CM

## 2021-02-04 DIAGNOSIS — I50.22 CHF (CONGESTIVE HEART FAILURE), NYHA CLASS II, CHRONIC, SYSTOLIC: ICD-10-CM

## 2021-02-04 DIAGNOSIS — I27.20 PULMONARY HYPERTENSION: ICD-10-CM

## 2021-02-04 DIAGNOSIS — J44.9 COPD, SEVERE: Chronic | ICD-10-CM

## 2021-02-04 DIAGNOSIS — R73.01 IMPAIRED FASTING GLUCOSE: ICD-10-CM

## 2021-02-04 DIAGNOSIS — I35.0 MILD AORTIC STENOSIS BY PRIOR ECHOCARDIOGRAM: ICD-10-CM

## 2021-02-04 DIAGNOSIS — I10 ESSENTIAL HYPERTENSION: Primary | ICD-10-CM

## 2021-02-04 DIAGNOSIS — R09.02 HYPOXEMIA REQUIRING SUPPLEMENTAL OXYGEN: ICD-10-CM

## 2021-02-04 DIAGNOSIS — I47.19 MULTIFOCAL ATRIAL TACHYCARDIA: ICD-10-CM

## 2021-02-04 DIAGNOSIS — I25.10 CAD IN NATIVE ARTERY: ICD-10-CM

## 2021-02-04 PROCEDURE — 92134 CPTRZ OPH DX IMG PST SGM RTA: CPT | Mod: PBBFAC | Performed by: OPHTHALMOLOGY

## 2021-02-04 PROCEDURE — 99499 NO LOS: ICD-10-PCS | Mod: S$PBB,,, | Performed by: OPHTHALMOLOGY

## 2021-02-04 PROCEDURE — 67028 PR INJECT INTRAVITREAL PHARMCOLOGIC: ICD-10-PCS | Mod: S$PBB,LT,, | Performed by: OPHTHALMOLOGY

## 2021-02-04 PROCEDURE — 92134 POSTERIOR SEGMENT OCT RETINA (OCULAR COHERENCE TOMOGRAPHY)-BOTH EYES: ICD-10-PCS | Mod: 26,S$PBB,, | Performed by: OPHTHALMOLOGY

## 2021-02-04 PROCEDURE — 67028 INJECTION EYE DRUG: CPT | Mod: PBBFAC | Performed by: OPHTHALMOLOGY

## 2021-02-04 PROCEDURE — 67028 INJECTION EYE DRUG: CPT | Mod: S$PBB,LT,, | Performed by: OPHTHALMOLOGY

## 2021-02-04 PROCEDURE — 99999 PR PBB SHADOW E&M-EST. PATIENT-LVL IV: CPT | Mod: PBBFAC,,, | Performed by: INTERNAL MEDICINE

## 2021-02-04 PROCEDURE — 99214 OFFICE O/P EST MOD 30 MIN: CPT | Mod: S$PBB,,, | Performed by: INTERNAL MEDICINE

## 2021-02-04 PROCEDURE — 99214 OFFICE O/P EST MOD 30 MIN: CPT | Mod: PBBFAC,PO,25 | Performed by: INTERNAL MEDICINE

## 2021-02-04 PROCEDURE — 99999 PR PBB SHADOW E&M-EST. PATIENT-LVL IV: ICD-10-PCS | Mod: PBBFAC,,, | Performed by: INTERNAL MEDICINE

## 2021-02-04 PROCEDURE — 99499 UNLISTED E&M SERVICE: CPT | Mod: S$PBB,,, | Performed by: OPHTHALMOLOGY

## 2021-02-04 PROCEDURE — 99214 PR OFFICE/OUTPT VISIT, EST, LEVL IV, 30-39 MIN: ICD-10-PCS | Mod: S$PBB,,, | Performed by: INTERNAL MEDICINE

## 2021-02-04 RX ORDER — ALPRAZOLAM 0.25 MG/1
0.25 TABLET ORAL 2 TIMES DAILY PRN
Qty: 60 TABLET | Refills: 5 | Status: SHIPPED | OUTPATIENT
Start: 2021-02-04 | End: 2021-03-10

## 2021-02-04 RX ADMIN — AFLIBERCEPT 2 MG: 40 INJECTION, SOLUTION INTRAVITREAL at 03:02

## 2021-02-11 ENCOUNTER — PATIENT OUTREACH (OUTPATIENT)
Dept: ADMINISTRATIVE | Facility: OTHER | Age: 86
End: 2021-02-11

## 2021-02-12 ENCOUNTER — OFFICE VISIT (OUTPATIENT)
Dept: PAIN MEDICINE | Facility: CLINIC | Age: 86
End: 2021-02-12
Payer: MEDICARE

## 2021-02-12 ENCOUNTER — IMMUNIZATION (OUTPATIENT)
Dept: INTERNAL MEDICINE | Facility: CLINIC | Age: 86
End: 2021-02-12
Payer: MEDICARE

## 2021-02-12 VITALS
WEIGHT: 164.44 LBS | SYSTOLIC BLOOD PRESSURE: 133 MMHG | DIASTOLIC BLOOD PRESSURE: 81 MMHG | HEART RATE: 100 BPM | BODY MASS INDEX: 25.81 KG/M2 | HEIGHT: 67 IN

## 2021-02-12 DIAGNOSIS — M50.30 DDD (DEGENERATIVE DISC DISEASE), CERVICAL: ICD-10-CM

## 2021-02-12 DIAGNOSIS — B02.29 POST HERPETIC NEURALGIA: Primary | ICD-10-CM

## 2021-02-12 DIAGNOSIS — Z23 NEED FOR VACCINATION: Primary | ICD-10-CM

## 2021-02-12 DIAGNOSIS — M47.892 OTHER SPONDYLOSIS, CERVICAL REGION: ICD-10-CM

## 2021-02-12 DIAGNOSIS — M79.602 LEFT ARM PAIN: ICD-10-CM

## 2021-02-12 DIAGNOSIS — M54.12 CERVICAL RADICULOPATHY: ICD-10-CM

## 2021-02-12 PROCEDURE — 91300 COVID-19, MRNA, LNP-S, PF, 30 MCG/0.3 ML DOSE VACCINE: CPT | Mod: PBBFAC

## 2021-02-12 PROCEDURE — 99215 OFFICE O/P EST HI 40 MIN: CPT | Mod: PBBFAC | Performed by: PHYSICAL MEDICINE & REHABILITATION

## 2021-02-12 PROCEDURE — 99215 OFFICE O/P EST HI 40 MIN: CPT | Mod: S$PBB,,, | Performed by: PHYSICAL MEDICINE & REHABILITATION

## 2021-02-12 PROCEDURE — 99999 PR PBB SHADOW E&M-EST. PATIENT-LVL V: CPT | Mod: PBBFAC,,, | Performed by: PHYSICAL MEDICINE & REHABILITATION

## 2021-02-12 PROCEDURE — 99215 PR OFFICE/OUTPT VISIT, EST, LEVL V, 40-54 MIN: ICD-10-PCS | Mod: S$PBB,,, | Performed by: PHYSICAL MEDICINE & REHABILITATION

## 2021-02-12 PROCEDURE — 99999 PR PBB SHADOW E&M-EST. PATIENT-LVL V: ICD-10-PCS | Mod: PBBFAC,,, | Performed by: PHYSICAL MEDICINE & REHABILITATION

## 2021-02-12 RX ORDER — GABAPENTIN 300 MG/1
300 CAPSULE ORAL 2 TIMES DAILY
Qty: 60 CAPSULE | Refills: 2 | Status: SHIPPED | OUTPATIENT
Start: 2021-02-12 | End: 2021-05-13

## 2021-02-26 ENCOUNTER — OFFICE VISIT (OUTPATIENT)
Dept: CARDIOLOGY | Facility: CLINIC | Age: 86
End: 2021-02-26
Payer: MEDICARE

## 2021-02-26 VITALS
HEART RATE: 87 BPM | DIASTOLIC BLOOD PRESSURE: 80 MMHG | SYSTOLIC BLOOD PRESSURE: 130 MMHG | BODY MASS INDEX: 26.44 KG/M2 | WEIGHT: 168.44 LBS | OXYGEN SATURATION: 92 % | HEIGHT: 67 IN

## 2021-02-26 DIAGNOSIS — I35.0 MILD AORTIC STENOSIS BY PRIOR ECHOCARDIOGRAM: ICD-10-CM

## 2021-02-26 DIAGNOSIS — I10 ESSENTIAL HYPERTENSION: ICD-10-CM

## 2021-02-26 DIAGNOSIS — I27.81 COR PULMONALE: Primary | ICD-10-CM

## 2021-02-26 DIAGNOSIS — I35.1 NONRHEUMATIC AORTIC VALVE INSUFFICIENCY: ICD-10-CM

## 2021-02-26 DIAGNOSIS — I47.19 MULTIFOCAL ATRIAL TACHYCARDIA: ICD-10-CM

## 2021-02-26 DIAGNOSIS — Z99.81 HYPOXEMIA REQUIRING SUPPLEMENTAL OXYGEN: ICD-10-CM

## 2021-02-26 DIAGNOSIS — I07.1 TRICUSPID VALVE INSUFFICIENCY, UNSPECIFIED ETIOLOGY: ICD-10-CM

## 2021-02-26 DIAGNOSIS — E78.2 MIXED HYPERLIPIDEMIA: ICD-10-CM

## 2021-02-26 DIAGNOSIS — I27.20 PULMONARY HYPERTENSION: ICD-10-CM

## 2021-02-26 DIAGNOSIS — I48.0 PAROXYSMAL ATRIAL FIBRILLATION: ICD-10-CM

## 2021-02-26 DIAGNOSIS — I25.10 CAD IN NATIVE ARTERY: ICD-10-CM

## 2021-02-26 DIAGNOSIS — I35.1 AORTIC VALVE INSUFFICIENCY, ETIOLOGY OF CARDIAC VALVE DISEASE UNSPECIFIED: ICD-10-CM

## 2021-02-26 DIAGNOSIS — R09.02 EXERCISE HYPOXEMIA: Chronic | ICD-10-CM

## 2021-02-26 DIAGNOSIS — R09.02 HYPOXEMIA REQUIRING SUPPLEMENTAL OXYGEN: ICD-10-CM

## 2021-02-26 PROCEDURE — 99999 PR PBB SHADOW E&M-EST. PATIENT-LVL V: CPT | Mod: PBBFAC,,, | Performed by: INTERNAL MEDICINE

## 2021-02-26 PROCEDURE — 99214 PR OFFICE/OUTPT VISIT, EST, LEVL IV, 30-39 MIN: ICD-10-PCS | Mod: S$PBB,,, | Performed by: INTERNAL MEDICINE

## 2021-02-26 PROCEDURE — 99214 OFFICE O/P EST MOD 30 MIN: CPT | Mod: S$PBB,,, | Performed by: INTERNAL MEDICINE

## 2021-02-26 PROCEDURE — 99999 PR PBB SHADOW E&M-EST. PATIENT-LVL V: ICD-10-PCS | Mod: PBBFAC,,, | Performed by: INTERNAL MEDICINE

## 2021-02-26 PROCEDURE — 99215 OFFICE O/P EST HI 40 MIN: CPT | Mod: PBBFAC | Performed by: INTERNAL MEDICINE

## 2021-03-05 ENCOUNTER — IMMUNIZATION (OUTPATIENT)
Dept: INTERNAL MEDICINE | Facility: CLINIC | Age: 86
End: 2021-03-05
Payer: MEDICARE

## 2021-03-05 DIAGNOSIS — Z23 NEED FOR VACCINATION: Primary | ICD-10-CM

## 2021-03-05 PROCEDURE — 91300 COVID-19, MRNA, LNP-S, PF, 30 MCG/0.3 ML DOSE VACCINE: CPT | Mod: PBBFAC | Performed by: FAMILY MEDICINE

## 2021-03-05 PROCEDURE — 0002A COVID-19, MRNA, LNP-S, PF, 30 MCG/0.3 ML DOSE VACCINE: CPT | Mod: PBBFAC | Performed by: FAMILY MEDICINE

## 2021-03-10 ENCOUNTER — PATIENT MESSAGE (OUTPATIENT)
Dept: INTERNAL MEDICINE | Facility: CLINIC | Age: 86
End: 2021-03-10

## 2021-03-10 ENCOUNTER — TELEPHONE (OUTPATIENT)
Dept: INTERNAL MEDICINE | Facility: CLINIC | Age: 86
End: 2021-03-10

## 2021-03-10 ENCOUNTER — PATIENT MESSAGE (OUTPATIENT)
Dept: UROLOGY | Facility: CLINIC | Age: 86
End: 2021-03-10

## 2021-03-10 ENCOUNTER — HOSPITAL ENCOUNTER (INPATIENT)
Facility: HOSPITAL | Age: 86
LOS: 1 days | Discharge: HOSPICE | DRG: 189 | End: 2021-03-11
Attending: FAMILY MEDICINE | Admitting: HOSPITALIST
Payer: MEDICARE

## 2021-03-10 DIAGNOSIS — I48.0 PAROXYSMAL ATRIAL FIBRILLATION: ICD-10-CM

## 2021-03-10 DIAGNOSIS — I10 ESSENTIAL HYPERTENSION: Primary | ICD-10-CM

## 2021-03-10 DIAGNOSIS — R06.03 RESPIRATORY DISTRESS: Primary | ICD-10-CM

## 2021-03-10 DIAGNOSIS — I50.9 CHF (CONGESTIVE HEART FAILURE): ICD-10-CM

## 2021-03-10 DIAGNOSIS — R06.02 SHORTNESS OF BREATH: ICD-10-CM

## 2021-03-10 DIAGNOSIS — J18.9 PNEUMONIA OF RIGHT LOWER LOBE DUE TO INFECTIOUS ORGANISM: ICD-10-CM

## 2021-03-10 DIAGNOSIS — J44.9 COPD, SEVERE: Chronic | ICD-10-CM

## 2021-03-10 DIAGNOSIS — J96.01 ACUTE RESPIRATORY FAILURE WITH HYPOXIA AND HYPERCARBIA: ICD-10-CM

## 2021-03-10 DIAGNOSIS — R54 FRAIL ELDERLY: ICD-10-CM

## 2021-03-10 DIAGNOSIS — I50.43 ACUTE ON CHRONIC COMBINED SYSTOLIC AND DIASTOLIC CONGESTIVE HEART FAILURE: ICD-10-CM

## 2021-03-10 DIAGNOSIS — J96.02 ACUTE RESPIRATORY FAILURE WITH HYPOXIA AND HYPERCARBIA: ICD-10-CM

## 2021-03-10 LAB
ALBUMIN SERPL BCP-MCNC: 3.5 G/DL (ref 3.5–5.2)
ALLENS TEST: ABNORMAL
ALP SERPL-CCNC: 71 U/L (ref 55–135)
ALT SERPL W/O P-5'-P-CCNC: 12 U/L (ref 10–44)
ANION GAP SERPL CALC-SCNC: 8 MMOL/L (ref 8–16)
AST SERPL-CCNC: 14 U/L (ref 10–40)
BASOPHILS # BLD AUTO: 0.04 K/UL (ref 0–0.2)
BASOPHILS NFR BLD: 0.5 % (ref 0–1.9)
BILIRUB SERPL-MCNC: 0.7 MG/DL (ref 0.1–1)
BNP SERPL-MCNC: 321 PG/ML (ref 0–99)
BUN SERPL-MCNC: 13 MG/DL (ref 10–30)
CALCIUM SERPL-MCNC: 9.3 MG/DL (ref 8.7–10.5)
CHLORIDE SERPL-SCNC: 92 MMOL/L (ref 95–110)
CO2 SERPL-SCNC: 38 MMOL/L (ref 23–29)
CREAT SERPL-MCNC: 0.7 MG/DL (ref 0.5–1.4)
CTP QC/QA: YES
DELSYS: ABNORMAL
DIFFERENTIAL METHOD: ABNORMAL
EOSINOPHIL # BLD AUTO: 0.1 K/UL (ref 0–0.5)
EOSINOPHIL NFR BLD: 0.7 % (ref 0–8)
EP: 6
ERYTHROCYTE [DISTWIDTH] IN BLOOD BY AUTOMATED COUNT: 12.7 % (ref 11.5–14.5)
ERYTHROCYTE [SEDIMENTATION RATE] IN BLOOD BY WESTERGREN METHOD: 20 MM/H
EST. GFR  (AFRICAN AMERICAN): >60 ML/MIN/1.73 M^2
EST. GFR  (NON AFRICAN AMERICAN): >60 ML/MIN/1.73 M^2
FIO2: 50
GLUCOSE SERPL-MCNC: 128 MG/DL (ref 70–110)
HCO3 UR-SCNC: 43.8 MMOL/L (ref 24–28)
HCT VFR BLD AUTO: 41.7 % (ref 40–54)
HGB BLD-MCNC: 12.4 G/DL (ref 14–18)
IMM GRANULOCYTES # BLD AUTO: 0.02 K/UL (ref 0–0.04)
IMM GRANULOCYTES NFR BLD AUTO: 0.2 % (ref 0–0.5)
INFLUENZA A, MOLECULAR: NEGATIVE
INFLUENZA B, MOLECULAR: NEGATIVE
INR PPP: 1 (ref 0.8–1.2)
IP: 12
LACTATE SERPL-SCNC: 1 MMOL/L (ref 0.5–2.2)
LYMPHOCYTES # BLD AUTO: 1.5 K/UL (ref 1–4.8)
LYMPHOCYTES NFR BLD: 18 % (ref 18–48)
MCH RBC QN AUTO: 31.2 PG (ref 27–31)
MCHC RBC AUTO-ENTMCNC: 29.7 G/DL (ref 32–36)
MCV RBC AUTO: 105 FL (ref 82–98)
MODE: ABNORMAL
MONOCYTES # BLD AUTO: 0.5 K/UL (ref 0.3–1)
MONOCYTES NFR BLD: 6.4 % (ref 4–15)
NEUTROPHILS # BLD AUTO: 6.1 K/UL (ref 1.8–7.7)
NEUTROPHILS NFR BLD: 74.2 % (ref 38–73)
NRBC BLD-RTO: 0 /100 WBC
PCO2 BLDA: 86.5 MMHG (ref 35–45)
PH SMN: 7.31 [PH] (ref 7.35–7.45)
PLATELET # BLD AUTO: 169 K/UL (ref 150–350)
PMV BLD AUTO: 10.4 FL (ref 9.2–12.9)
PO2 BLDA: 134 MMHG (ref 80–100)
POC BE: 18 MMOL/L
POC SATURATED O2: 98 % (ref 95–100)
POTASSIUM SERPL-SCNC: 4.9 MMOL/L (ref 3.5–5.1)
PROCALCITONIN SERPL IA-MCNC: 0.02 NG/ML
PROT SERPL-MCNC: 6.9 G/DL (ref 6–8.4)
PROTHROMBIN TIME: 11.2 SEC (ref 9–12.5)
RBC # BLD AUTO: 3.97 M/UL (ref 4.6–6.2)
SAMPLE: ABNORMAL
SARS-COV-2 RDRP RESP QL NAA+PROBE: NEGATIVE
SITE: ABNORMAL
SODIUM SERPL-SCNC: 138 MMOL/L (ref 136–145)
SP02: 100
SPECIMEN SOURCE: NORMAL
SPONT RATE: 24
TROPONIN I SERPL DL<=0.01 NG/ML-MCNC: 0.01 NG/ML (ref 0–0.03)
WBC # BLD AUTO: 8.18 K/UL (ref 3.9–12.7)

## 2021-03-10 PROCEDURE — 87040 BLOOD CULTURE FOR BACTERIA: CPT | Mod: 59 | Performed by: FAMILY MEDICINE

## 2021-03-10 PROCEDURE — 83605 ASSAY OF LACTIC ACID: CPT | Performed by: FAMILY MEDICINE

## 2021-03-10 PROCEDURE — 99900035 HC TECH TIME PER 15 MIN (STAT)

## 2021-03-10 PROCEDURE — 94660 CPAP INITIATION&MGMT: CPT

## 2021-03-10 PROCEDURE — 36415 COLL VENOUS BLD VENIPUNCTURE: CPT | Performed by: FAMILY MEDICINE

## 2021-03-10 PROCEDURE — 85025 COMPLETE CBC W/AUTO DIFF WBC: CPT | Performed by: FAMILY MEDICINE

## 2021-03-10 PROCEDURE — 63600175 PHARM REV CODE 636 W HCPCS: Performed by: FAMILY MEDICINE

## 2021-03-10 PROCEDURE — 87502 INFLUENZA DNA AMP PROBE: CPT | Performed by: FAMILY MEDICINE

## 2021-03-10 PROCEDURE — 93010 ELECTROCARDIOGRAM REPORT: CPT | Mod: ,,, | Performed by: INTERNAL MEDICINE

## 2021-03-10 PROCEDURE — 99291 CRITICAL CARE FIRST HOUR: CPT | Mod: 25

## 2021-03-10 PROCEDURE — 96374 THER/PROPH/DIAG INJ IV PUSH: CPT

## 2021-03-10 PROCEDURE — 93010 EKG 12-LEAD: ICD-10-PCS | Mod: ,,, | Performed by: INTERNAL MEDICINE

## 2021-03-10 PROCEDURE — 82803 BLOOD GASES ANY COMBINATION: CPT

## 2021-03-10 PROCEDURE — 80053 COMPREHEN METABOLIC PANEL: CPT | Performed by: FAMILY MEDICINE

## 2021-03-10 PROCEDURE — 93005 ELECTROCARDIOGRAM TRACING: CPT

## 2021-03-10 PROCEDURE — 27000190 HC CPAP FULL FACE MASK W/VALVE

## 2021-03-10 PROCEDURE — 84145 PROCALCITONIN (PCT): CPT | Performed by: FAMILY MEDICINE

## 2021-03-10 PROCEDURE — 85610 PROTHROMBIN TIME: CPT | Performed by: FAMILY MEDICINE

## 2021-03-10 PROCEDURE — 20000000 HC ICU ROOM

## 2021-03-10 PROCEDURE — 94640 AIRWAY INHALATION TREATMENT: CPT

## 2021-03-10 PROCEDURE — 83880 ASSAY OF NATRIURETIC PEPTIDE: CPT | Performed by: FAMILY MEDICINE

## 2021-03-10 PROCEDURE — U0002 COVID-19 LAB TEST NON-CDC: HCPCS | Performed by: FAMILY MEDICINE

## 2021-03-10 PROCEDURE — 36600 WITHDRAWAL OF ARTERIAL BLOOD: CPT

## 2021-03-10 PROCEDURE — 25000003 PHARM REV CODE 250: Performed by: FAMILY MEDICINE

## 2021-03-10 PROCEDURE — 25000242 PHARM REV CODE 250 ALT 637 W/ HCPCS: Performed by: FAMILY MEDICINE

## 2021-03-10 PROCEDURE — 84484 ASSAY OF TROPONIN QUANT: CPT | Performed by: FAMILY MEDICINE

## 2021-03-10 RX ORDER — SIMVASTATIN 20 MG/1
40 TABLET, FILM COATED ORAL NIGHTLY
Status: DISCONTINUED | OUTPATIENT
Start: 2021-03-11 | End: 2021-03-11 | Stop reason: HOSPADM

## 2021-03-10 RX ORDER — FINASTERIDE 5 MG/1
5 TABLET, FILM COATED ORAL DAILY
Status: DISCONTINUED | OUTPATIENT
Start: 2021-03-11 | End: 2021-03-11 | Stop reason: HOSPADM

## 2021-03-10 RX ORDER — PREDNISONE 20 MG/1
40 TABLET ORAL DAILY
Status: DISCONTINUED | OUTPATIENT
Start: 2021-03-11 | End: 2021-03-11 | Stop reason: HOSPADM

## 2021-03-10 RX ORDER — METHYLPREDNISOLONE SOD SUCC 125 MG
125 VIAL (EA) INJECTION
Status: COMPLETED | OUTPATIENT
Start: 2021-03-10 | End: 2021-03-10

## 2021-03-10 RX ORDER — CARVEDILOL 12.5 MG/1
25 TABLET ORAL 2 TIMES DAILY
Status: DISCONTINUED | OUTPATIENT
Start: 2021-03-11 | End: 2021-03-11 | Stop reason: HOSPADM

## 2021-03-10 RX ORDER — ROFLUMILAST 500 UG/1
500 TABLET ORAL DAILY
Status: DISCONTINUED | OUTPATIENT
Start: 2021-03-11 | End: 2021-03-11 | Stop reason: HOSPADM

## 2021-03-10 RX ORDER — FUROSEMIDE 10 MG/ML
80 INJECTION INTRAMUSCULAR; INTRAVENOUS EVERY 8 HOURS
Status: DISCONTINUED | OUTPATIENT
Start: 2021-03-11 | End: 2021-03-11 | Stop reason: HOSPADM

## 2021-03-10 RX ORDER — BUDESONIDE 0.5 MG/2ML
0.5 INHALANT ORAL EVERY 12 HOURS
Status: DISCONTINUED | OUTPATIENT
Start: 2021-03-11 | End: 2021-03-11 | Stop reason: HOSPADM

## 2021-03-10 RX ORDER — IPRATROPIUM BROMIDE AND ALBUTEROL SULFATE 2.5; .5 MG/3ML; MG/3ML
3 SOLUTION RESPIRATORY (INHALATION)
Status: COMPLETED | OUTPATIENT
Start: 2021-03-10 | End: 2021-03-10

## 2021-03-10 RX ORDER — FLUTICASONE PROPIONATE 50 MCG
2 SPRAY, SUSPENSION (ML) NASAL DAILY
Status: DISCONTINUED | OUTPATIENT
Start: 2021-03-11 | End: 2021-03-11 | Stop reason: HOSPADM

## 2021-03-10 RX ORDER — SODIUM CHLORIDE 0.9 % (FLUSH) 0.9 %
10 SYRINGE (ML) INJECTION
Status: DISCONTINUED | OUTPATIENT
Start: 2021-03-10 | End: 2021-03-11 | Stop reason: HOSPADM

## 2021-03-10 RX ORDER — IPRATROPIUM BROMIDE AND ALBUTEROL SULFATE 2.5; .5 MG/3ML; MG/3ML
3 SOLUTION RESPIRATORY (INHALATION) EVERY 6 HOURS
Status: DISCONTINUED | OUTPATIENT
Start: 2021-03-11 | End: 2021-03-11

## 2021-03-10 RX ORDER — POTASSIUM CHLORIDE 20 MEQ/1
20 TABLET, EXTENDED RELEASE ORAL 2 TIMES DAILY
Status: DISCONTINUED | OUTPATIENT
Start: 2021-03-11 | End: 2021-03-11

## 2021-03-10 RX ORDER — TAMSULOSIN HYDROCHLORIDE 0.4 MG/1
0.4 CAPSULE ORAL DAILY
Status: DISCONTINUED | OUTPATIENT
Start: 2021-03-11 | End: 2021-03-11 | Stop reason: HOSPADM

## 2021-03-10 RX ORDER — LISINOPRIL 20 MG/1
40 TABLET ORAL DAILY
Status: DISCONTINUED | OUTPATIENT
Start: 2021-03-11 | End: 2021-03-11 | Stop reason: HOSPADM

## 2021-03-10 RX ADMIN — METHYLPREDNISOLONE SODIUM SUCCINATE 125 MG: 125 INJECTION, POWDER, FOR SOLUTION INTRAMUSCULAR; INTRAVENOUS at 08:03

## 2021-03-10 RX ADMIN — IPRATROPIUM BROMIDE AND ALBUTEROL SULFATE 3 ML: .5; 3 SOLUTION RESPIRATORY (INHALATION) at 08:03

## 2021-03-10 RX ADMIN — IOHEXOL 100 ML: 350 INJECTION, SOLUTION INTRAVENOUS at 11:03

## 2021-03-10 RX ADMIN — PIPERACILLIN AND TAZOBACTAM 4.5 G: 4; .5 INJECTION, POWDER, LYOPHILIZED, FOR SOLUTION INTRAVENOUS; PARENTERAL at 10:03

## 2021-03-11 ENCOUNTER — TELEPHONE (OUTPATIENT)
Dept: INTERNAL MEDICINE | Facility: CLINIC | Age: 86
End: 2021-03-11

## 2021-03-11 VITALS
HEART RATE: 93 BPM | HEIGHT: 67 IN | OXYGEN SATURATION: 98 % | TEMPERATURE: 99 F | SYSTOLIC BLOOD PRESSURE: 102 MMHG | BODY MASS INDEX: 26.21 KG/M2 | DIASTOLIC BLOOD PRESSURE: 57 MMHG | WEIGHT: 167 LBS | RESPIRATION RATE: 24 BRPM

## 2021-03-11 LAB
ALBUMIN SERPL BCP-MCNC: 3 G/DL (ref 3.5–5.2)
ALLENS TEST: ABNORMAL
ALLENS TEST: ABNORMAL
ALP SERPL-CCNC: 65 U/L (ref 55–135)
ALT SERPL W/O P-5'-P-CCNC: 11 U/L (ref 10–44)
ANION GAP SERPL CALC-SCNC: 8 MMOL/L (ref 8–16)
AORTIC ROOT ANNULUS: 3.69 CM
ASCENDING AORTA: 2.99 CM
AST SERPL-CCNC: 11 U/L (ref 10–40)
AV INDEX (PROSTH): 0.58
AV MEAN GRADIENT: 5 MMHG
AV PEAK GRADIENT: 7 MMHG
AV VALVE AREA: 1.48 CM2
AV VELOCITY RATIO: 0.53
BASOPHILS # BLD AUTO: 0.01 K/UL (ref 0–0.2)
BASOPHILS NFR BLD: 0.2 % (ref 0–1.9)
BILIRUB SERPL-MCNC: 0.7 MG/DL (ref 0.1–1)
BSA FOR ECHO PROCEDURE: 1.89 M2
BUN SERPL-MCNC: 14 MG/DL (ref 10–30)
CALCIUM SERPL-MCNC: 8.7 MG/DL (ref 8.7–10.5)
CHLORIDE SERPL-SCNC: 90 MMOL/L (ref 95–110)
CO2 SERPL-SCNC: 40 MMOL/L (ref 23–29)
CREAT SERPL-MCNC: 0.7 MG/DL (ref 0.5–1.4)
CV ECHO LV RWT: 0.7 CM
DELSYS: ABNORMAL
DELSYS: ABNORMAL
DIFFERENTIAL METHOD: ABNORMAL
DOP CALC AO PEAK VEL: 1.33 M/S
DOP CALC AO VTI: 24.41 CM
DOP CALC LVOT AREA: 2.5 CM2
DOP CALC LVOT DIAMETER: 1.8 CM
DOP CALC LVOT PEAK VEL: 0.71 M/S
DOP CALC LVOT STROKE VOLUME: 36.04 CM3
DOP CALC RVOT PEAK VEL: 0.75 M/S
DOP CALC RVOT VTI: 12.39 CM
DOP CALCLVOT PEAK VEL VTI: 14.17 CM
E WAVE DECELERATION TIME: 119.49 MSEC
E/A RATIO: 1.6
E/E' RATIO: 4.79 M/S
ECHO LV POSTERIOR WALL: 1.41 CM (ref 0.6–1.1)
EOSINOPHIL # BLD AUTO: 0 K/UL (ref 0–0.5)
EOSINOPHIL NFR BLD: 0 % (ref 0–8)
ERYTHROCYTE [DISTWIDTH] IN BLOOD BY AUTOMATED COUNT: 12.7 % (ref 11.5–14.5)
ERYTHROCYTE [SEDIMENTATION RATE] IN BLOOD BY WESTERGREN METHOD: 18 MM/H
EST. GFR  (AFRICAN AMERICAN): >60 ML/MIN/1.73 M^2
EST. GFR  (NON AFRICAN AMERICAN): >60 ML/MIN/1.73 M^2
FIO2: 40
FIO2: 50
FLOW: 5
FRACTIONAL SHORTENING: 35 % (ref 28–44)
GLUCOSE SERPL-MCNC: 133 MG/DL (ref 70–110)
HCO3 UR-SCNC: 22.9 MMOL/L (ref 24–28)
HCO3 UR-SCNC: 40.9 MMOL/L (ref 24–28)
HCT VFR BLD AUTO: 38.4 % (ref 40–54)
HGB BLD-MCNC: 11.5 G/DL (ref 14–18)
IMM GRANULOCYTES # BLD AUTO: 0.03 K/UL (ref 0–0.04)
IMM GRANULOCYTES NFR BLD AUTO: 0.5 % (ref 0–0.5)
INTERVENTRICULAR SEPTUM: 1.46 CM (ref 0.6–1.1)
IVRT: 48.44 MSEC
LA MAJOR: 3.21 CM
LA MINOR: 2.9 CM
LA WIDTH: 3.03 CM
LACTATE SERPL-SCNC: 1.1 MMOL/L (ref 0.5–2.2)
LEFT ATRIUM SIZE: 2.83 CM
LEFT ATRIUM VOLUME INDEX: 11.9 ML/M2
LEFT ATRIUM VOLUME: 22.21 CM3
LEFT INTERNAL DIMENSION IN SYSTOLE: 2.6 CM (ref 2.1–4)
LEFT VENTRICLE DIASTOLIC VOLUME INDEX: 38.09 ML/M2
LEFT VENTRICLE DIASTOLIC VOLUME: 71.23 ML
LEFT VENTRICLE MASS INDEX: 117 G/M2
LEFT VENTRICLE SYSTOLIC VOLUME INDEX: 13.2 ML/M2
LEFT VENTRICLE SYSTOLIC VOLUME: 24.68 ML
LEFT VENTRICULAR INTERNAL DIMENSION IN DIASTOLE: 4.03 CM (ref 3.5–6)
LEFT VENTRICULAR MASS: 219.46 G
LV LATERAL E/E' RATIO: 5.58 M/S
LV SEPTAL E/E' RATIO: 4.19 M/S
LYMPHOCYTES # BLD AUTO: 0.8 K/UL (ref 1–4.8)
LYMPHOCYTES NFR BLD: 13.5 % (ref 18–48)
MAGNESIUM SERPL-MCNC: 1.8 MG/DL (ref 1.6–2.6)
MCH RBC QN AUTO: 31.8 PG (ref 27–31)
MCHC RBC AUTO-ENTMCNC: 29.9 G/DL (ref 32–36)
MCV RBC AUTO: 106 FL (ref 82–98)
MODE: ABNORMAL
MODE: ABNORMAL
MONOCYTES # BLD AUTO: 0.1 K/UL (ref 0.3–1)
MONOCYTES NFR BLD: 1.7 % (ref 4–15)
MV PEAK A VEL: 0.42 M/S
MV PEAK E VEL: 0.67 M/S
MV STENOSIS PRESSURE HALF TIME: 34.65 MS
MV VALVE AREA P 1/2 METHOD: 6.35 CM2
NEUTROPHILS # BLD AUTO: 4.9 K/UL (ref 1.8–7.7)
NEUTROPHILS NFR BLD: 84.1 % (ref 38–73)
NRBC BLD-RTO: 0 /100 WBC
PCO2 BLDA: 36.9 MMHG (ref 35–45)
PCO2 BLDA: 68.2 MMHG (ref 35–45)
PH SMN: 7.39 [PH] (ref 7.35–7.45)
PH SMN: 7.4 [PH] (ref 7.35–7.45)
PISA TR MAX VEL: 2.9 M/S
PLATELET # BLD AUTO: 120 K/UL (ref 150–350)
PMV BLD AUTO: 10.3 FL (ref 9.2–12.9)
PO2 BLDA: 24 MMHG (ref 40–60)
PO2 BLDA: 82 MMHG (ref 80–100)
POC BE: -2 MMOL/L
POC BE: 16 MMOL/L
POC SATURATED O2: 44 % (ref 95–100)
POC SATURATED O2: 95 % (ref 95–100)
POTASSIUM SERPL-SCNC: 4 MMOL/L (ref 3.5–5.1)
PROT SERPL-MCNC: 6.1 G/DL (ref 6–8.4)
PV MEAN GRADIENT: 2 MMHG
RA MAJOR: 3.63 CM
RA WIDTH: 3 CM
RBC # BLD AUTO: 3.62 M/UL (ref 4.6–6.2)
SAMPLE: ABNORMAL
SAMPLE: ABNORMAL
SINUS: 3.3 CM
SITE: ABNORMAL
SITE: ABNORMAL
SODIUM SERPL-SCNC: 138 MMOL/L (ref 136–145)
STJ: 3.17 CM
TDI LATERAL: 0.12 M/S
TDI SEPTAL: 0.16 M/S
TDI: 0.14 M/S
TR MAX PG: 34 MMHG
TRICUSPID ANNULAR PLANE SYSTOLIC EXCURSION: 1.68 CM
VT: 400
WBC # BLD AUTO: 5.77 K/UL (ref 3.9–12.7)

## 2021-03-11 PROCEDURE — 99900035 HC TECH TIME PER 15 MIN (STAT)

## 2021-03-11 PROCEDURE — 83735 ASSAY OF MAGNESIUM: CPT | Performed by: HOSPITALIST

## 2021-03-11 PROCEDURE — 25000242 PHARM REV CODE 250 ALT 637 W/ HCPCS: Performed by: HOSPITALIST

## 2021-03-11 PROCEDURE — 85025 COMPLETE CBC W/AUTO DIFF WBC: CPT | Performed by: HOSPITALIST

## 2021-03-11 PROCEDURE — 82803 BLOOD GASES ANY COMBINATION: CPT

## 2021-03-11 PROCEDURE — 25000003 PHARM REV CODE 250: Performed by: HOSPITALIST

## 2021-03-11 PROCEDURE — 36415 COLL VENOUS BLD VENIPUNCTURE: CPT | Performed by: FAMILY MEDICINE

## 2021-03-11 PROCEDURE — 25500020 PHARM REV CODE 255: Performed by: FAMILY MEDICINE

## 2021-03-11 PROCEDURE — 25000003 PHARM REV CODE 250: Performed by: INTERNAL MEDICINE

## 2021-03-11 PROCEDURE — 36415 COLL VENOUS BLD VENIPUNCTURE: CPT | Performed by: HOSPITALIST

## 2021-03-11 PROCEDURE — 25000242 PHARM REV CODE 250 ALT 637 W/ HCPCS: Performed by: INTERNAL MEDICINE

## 2021-03-11 PROCEDURE — 36600 WITHDRAWAL OF ARTERIAL BLOOD: CPT

## 2021-03-11 PROCEDURE — 63600175 PHARM REV CODE 636 W HCPCS: Performed by: HOSPITALIST

## 2021-03-11 PROCEDURE — 27000221 HC OXYGEN, UP TO 24 HOURS

## 2021-03-11 PROCEDURE — 83605 ASSAY OF LACTIC ACID: CPT | Performed by: FAMILY MEDICINE

## 2021-03-11 PROCEDURE — 80053 COMPREHEN METABOLIC PANEL: CPT | Performed by: HOSPITALIST

## 2021-03-11 PROCEDURE — 94640 AIRWAY INHALATION TREATMENT: CPT

## 2021-03-11 RX ORDER — IPRATROPIUM BROMIDE 0.5 MG/2.5ML
0.5 SOLUTION RESPIRATORY (INHALATION) EVERY 6 HOURS
Status: DISCONTINUED | OUTPATIENT
Start: 2021-03-11 | End: 2021-03-11

## 2021-03-11 RX ORDER — LEVALBUTEROL INHALATION SOLUTION 0.63 MG/3ML
1.26 SOLUTION RESPIRATORY (INHALATION) EVERY 8 HOURS
Status: DISCONTINUED | OUTPATIENT
Start: 2021-03-11 | End: 2021-03-11 | Stop reason: HOSPADM

## 2021-03-11 RX ORDER — MUPIROCIN 20 MG/G
OINTMENT TOPICAL 2 TIMES DAILY
Status: DISCONTINUED | OUTPATIENT
Start: 2021-03-11 | End: 2021-03-11 | Stop reason: HOSPADM

## 2021-03-11 RX ORDER — IPRATROPIUM BROMIDE 0.5 MG/2.5ML
0.5 SOLUTION RESPIRATORY (INHALATION) EVERY 8 HOURS
Status: DISCONTINUED | OUTPATIENT
Start: 2021-03-11 | End: 2021-03-11 | Stop reason: HOSPADM

## 2021-03-11 RX ORDER — LEVALBUTEROL INHALATION SOLUTION 0.63 MG/3ML
1.25 SOLUTION RESPIRATORY (INHALATION) EVERY 8 HOURS
Status: DISCONTINUED | OUTPATIENT
Start: 2021-03-11 | End: 2021-03-11 | Stop reason: SDUPTHER

## 2021-03-11 RX ORDER — LEVALBUTEROL INHALATION SOLUTION 0.63 MG/3ML
1.26 SOLUTION RESPIRATORY (INHALATION) EVERY 8 HOURS
Status: DISCONTINUED | OUTPATIENT
Start: 2021-03-11 | End: 2021-03-11

## 2021-03-11 RX ORDER — IPRATROPIUM BROMIDE AND ALBUTEROL SULFATE 2.5; .5 MG/3ML; MG/3ML
3 SOLUTION RESPIRATORY (INHALATION) EVERY 4 HOURS PRN
Status: DISCONTINUED | OUTPATIENT
Start: 2021-03-11 | End: 2021-03-11 | Stop reason: HOSPADM

## 2021-03-11 RX ORDER — IPRATROPIUM BROMIDE 0.5 MG/2.5ML
0.5 SOLUTION RESPIRATORY (INHALATION) EVERY 4 HOURS
Status: DISCONTINUED | OUTPATIENT
Start: 2021-03-11 | End: 2021-03-11

## 2021-03-11 RX ORDER — LEVALBUTEROL INHALATION SOLUTION 0.63 MG/3ML
1.26 SOLUTION RESPIRATORY (INHALATION) EVERY 4 HOURS
Status: DISCONTINUED | OUTPATIENT
Start: 2021-03-11 | End: 2021-03-11

## 2021-03-11 RX ADMIN — LEVALBUTEROL HYDROCHLORIDE 1.26 MG: 0.63 SOLUTION RESPIRATORY (INHALATION) at 07:03

## 2021-03-11 RX ADMIN — ROFLUMILAST 500 MCG: 500 TABLET ORAL at 09:03

## 2021-03-11 RX ADMIN — LEVALBUTEROL HYDROCHLORIDE 1.26 MG: 0.63 SOLUTION RESPIRATORY (INHALATION) at 03:03

## 2021-03-11 RX ADMIN — BUDESONIDE 0.5 MG: 0.5 SUSPENSION RESPIRATORY (INHALATION) at 07:03

## 2021-03-11 RX ADMIN — IPRATROPIUM BROMIDE 0.5 MG: 0.5 SOLUTION RESPIRATORY (INHALATION) at 03:03

## 2021-03-11 RX ADMIN — APIXABAN 2.5 MG: 2.5 TABLET, FILM COATED ORAL at 09:03

## 2021-03-11 RX ADMIN — CARVEDILOL 25 MG: 12.5 TABLET, FILM COATED ORAL at 09:03

## 2021-03-11 RX ADMIN — DOXYCYCLINE 100 MG: 100 INJECTION, POWDER, LYOPHILIZED, FOR SOLUTION INTRAVENOUS at 12:03

## 2021-03-11 RX ADMIN — LISINOPRIL 40 MG: 20 TABLET ORAL at 09:03

## 2021-03-11 RX ADMIN — IPRATROPIUM BROMIDE 0.5 MG: 0.5 SOLUTION RESPIRATORY (INHALATION) at 07:03

## 2021-03-11 RX ADMIN — TAMSULOSIN HYDROCHLORIDE 0.4 MG: 0.4 CAPSULE ORAL at 09:03

## 2021-03-11 RX ADMIN — FINASTERIDE 5 MG: 5 TABLET, FILM COATED ORAL at 09:03

## 2021-03-11 RX ADMIN — PREDNISONE 40 MG: 20 TABLET ORAL at 09:03

## 2021-03-11 RX ADMIN — MUPIROCIN: 20 OINTMENT TOPICAL at 09:03

## 2021-03-11 RX ADMIN — FUROSEMIDE 80 MG: 10 INJECTION, SOLUTION INTRAMUSCULAR; INTRAVENOUS at 05:03

## 2021-03-12 ENCOUNTER — PATIENT MESSAGE (OUTPATIENT)
Dept: INTERNAL MEDICINE | Facility: CLINIC | Age: 86
End: 2021-03-12

## 2021-03-12 ENCOUNTER — PATIENT MESSAGE (OUTPATIENT)
Dept: PULMONOLOGY | Facility: CLINIC | Age: 86
End: 2021-03-12

## 2021-03-12 ENCOUNTER — PATIENT MESSAGE (OUTPATIENT)
Dept: OPHTHALMOLOGY | Facility: CLINIC | Age: 86
End: 2021-03-12

## 2021-03-16 LAB
BACTERIA BLD CULT: NORMAL
BACTERIA BLD CULT: NORMAL

## 2021-03-29 ENCOUNTER — PATIENT MESSAGE (OUTPATIENT)
Dept: OPHTHALMOLOGY | Facility: CLINIC | Age: 86
End: 2021-03-29

## 2021-03-31 ENCOUNTER — PATIENT MESSAGE (OUTPATIENT)
Dept: PAIN MEDICINE | Facility: CLINIC | Age: 86
End: 2021-03-31

## 2021-06-09 ENCOUNTER — PES CALL (OUTPATIENT)
Dept: ADMINISTRATIVE | Facility: CLINIC | Age: 86
End: 2021-06-09

## 2022-02-03 ENCOUNTER — PES CALL (OUTPATIENT)
Dept: ADMINISTRATIVE | Facility: CLINIC | Age: 87
End: 2022-02-03
Payer: MEDICARE

## 2022-04-27 ENCOUNTER — PATIENT MESSAGE (OUTPATIENT)
Dept: ADMINISTRATIVE | Facility: HOSPITAL | Age: 87
End: 2022-04-27
Payer: MEDICARE

## 2022-04-27 DIAGNOSIS — I10 ESSENTIAL HYPERTENSION: ICD-10-CM

## 2022-07-01 ENCOUNTER — PATIENT OUTREACH (OUTPATIENT)
Dept: ADMINISTRATIVE | Facility: HOSPITAL | Age: 87
End: 2022-07-01
Payer: MEDICARE

## 2022-07-12 ENCOUNTER — PATIENT OUTREACH (OUTPATIENT)
Dept: ADMINISTRATIVE | Facility: HOSPITAL | Age: 87
End: 2022-07-12
Payer: MEDICARE

## 2022-07-12 NOTE — PROGRESS NOTES
Patient's wife called and stated that the patient passed away in August of last year. I informed the patient's wife that I will update the chart.